# Patient Record
Sex: FEMALE | Employment: OTHER
[De-identification: names, ages, dates, MRNs, and addresses within clinical notes are randomized per-mention and may not be internally consistent; named-entity substitution may affect disease eponyms.]

---

## 2017-01-30 ENCOUNTER — RECORDS - HEALTHEAST (OUTPATIENT)
Dept: ADMINISTRATIVE | Facility: OTHER | Age: 72
End: 2017-01-30

## 2017-02-03 ENCOUNTER — OFFICE VISIT - HEALTHEAST (OUTPATIENT)
Dept: ENDOCRINOLOGY | Facility: CLINIC | Age: 72
End: 2017-02-03

## 2017-02-03 DIAGNOSIS — M81.0 OSTEOPOROSIS: ICD-10-CM

## 2017-02-22 ENCOUNTER — COMMUNICATION - HEALTHEAST (OUTPATIENT)
Dept: ENDOCRINOLOGY | Facility: CLINIC | Age: 72
End: 2017-02-22

## 2017-02-22 DIAGNOSIS — M81.0 OSTEOPOROSIS: ICD-10-CM

## 2017-02-27 ENCOUNTER — COMMUNICATION - HEALTHEAST (OUTPATIENT)
Dept: ADMINISTRATIVE | Facility: CLINIC | Age: 72
End: 2017-02-27

## 2017-06-13 ENCOUNTER — RECORDS - HEALTHEAST (OUTPATIENT)
Dept: RADIOLOGY | Facility: CLINIC | Age: 72
End: 2017-06-13

## 2017-06-13 ENCOUNTER — AMBULATORY - HEALTHEAST (OUTPATIENT)
Dept: NEUROSURGERY | Facility: CLINIC | Age: 72
End: 2017-06-13

## 2017-06-13 ENCOUNTER — OFFICE VISIT - HEALTHEAST (OUTPATIENT)
Dept: NEUROSURGERY | Facility: CLINIC | Age: 72
End: 2017-06-13

## 2017-06-13 DIAGNOSIS — E55.9 VITAMIN D DEFICIENCY: ICD-10-CM

## 2017-06-13 DIAGNOSIS — M48.54XS: ICD-10-CM

## 2017-06-13 DIAGNOSIS — S32.028G OTHER CLOSED FRACTURE OF SECOND LUMBAR VERTEBRA WITH DELAYED HEALING, SUBSEQUENT ENCOUNTER: ICD-10-CM

## 2017-06-13 DIAGNOSIS — M42.00 SCHEUERMANN'S KYPHOSIS: ICD-10-CM

## 2017-06-13 DIAGNOSIS — M16.11 ARTHROPATHY OF RIGHT HIP: ICD-10-CM

## 2017-06-13 DIAGNOSIS — S32.009K PSEUDOARTHROSIS OF LUMBAR SPINE: ICD-10-CM

## 2017-06-13 ASSESSMENT — MIFFLIN-ST. JEOR: SCORE: 1095.97

## 2017-06-21 ENCOUNTER — COMMUNICATION - HEALTHEAST (OUTPATIENT)
Dept: ADMINISTRATIVE | Facility: CLINIC | Age: 72
End: 2017-06-21

## 2017-06-21 ENCOUNTER — AMBULATORY - HEALTHEAST (OUTPATIENT)
Dept: ENDOCRINOLOGY | Facility: CLINIC | Age: 72
End: 2017-06-21

## 2017-06-21 DIAGNOSIS — M81.0 OSTEOPOROSIS: ICD-10-CM

## 2017-06-21 DIAGNOSIS — E55.9 VITAMIN D DEFICIENCY: ICD-10-CM

## 2017-06-27 ENCOUNTER — RECORDS - HEALTHEAST (OUTPATIENT)
Dept: ADMINISTRATIVE | Facility: OTHER | Age: 72
End: 2017-06-27

## 2017-06-27 ENCOUNTER — RECORDS - HEALTHEAST (OUTPATIENT)
Dept: BONE DENSITY | Facility: CLINIC | Age: 72
End: 2017-06-27

## 2017-06-27 DIAGNOSIS — M81.0 AGE-RELATED OSTEOPOROSIS WITHOUT CURRENT PATHOLOGICAL FRACTURE: ICD-10-CM

## 2017-06-28 ENCOUNTER — AMBULATORY - HEALTHEAST (OUTPATIENT)
Dept: ENDOCRINOLOGY | Facility: CLINIC | Age: 72
End: 2017-06-28

## 2017-06-28 DIAGNOSIS — M81.0 OSTEOPOROSIS: ICD-10-CM

## 2017-07-07 ENCOUNTER — AMBULATORY - HEALTHEAST (OUTPATIENT)
Dept: ENDOCRINOLOGY | Facility: CLINIC | Age: 72
End: 2017-07-07

## 2017-07-07 ENCOUNTER — COMMUNICATION - HEALTHEAST (OUTPATIENT)
Dept: ENDOCRINOLOGY | Facility: CLINIC | Age: 72
End: 2017-07-07

## 2017-07-07 DIAGNOSIS — M81.0 OSTEOPOROSIS: ICD-10-CM

## 2017-07-26 ENCOUNTER — AMBULATORY - HEALTHEAST (OUTPATIENT)
Dept: LAB | Facility: CLINIC | Age: 72
End: 2017-07-26

## 2017-07-26 DIAGNOSIS — M81.0 OSTEOPOROSIS: ICD-10-CM

## 2017-08-03 ENCOUNTER — OFFICE VISIT - HEALTHEAST (OUTPATIENT)
Dept: ENDOCRINOLOGY | Facility: CLINIC | Age: 72
End: 2017-08-03

## 2017-08-03 DIAGNOSIS — M81.0 OSTEOPOROSIS: ICD-10-CM

## 2017-08-03 ASSESSMENT — MIFFLIN-ST. JEOR: SCORE: 1098.69

## 2017-08-09 ENCOUNTER — COMMUNICATION - HEALTHEAST (OUTPATIENT)
Dept: ENDOCRINOLOGY | Facility: CLINIC | Age: 72
End: 2017-08-09

## 2017-10-06 ENCOUNTER — ANESTHESIA - HEALTHEAST (OUTPATIENT)
Dept: SURGERY | Facility: CLINIC | Age: 72
End: 2017-10-06

## 2017-10-06 ENCOUNTER — SURGERY - HEALTHEAST (OUTPATIENT)
Dept: SURGERY | Facility: CLINIC | Age: 72
End: 2017-10-06

## 2017-10-06 ASSESSMENT — MIFFLIN-ST. JEOR: SCORE: 1087.46

## 2017-10-07 ASSESSMENT — MIFFLIN-ST. JEOR: SCORE: 1094.6

## 2017-10-08 ASSESSMENT — MIFFLIN-ST. JEOR: SCORE: 1118.7

## 2017-10-09 ASSESSMENT — MIFFLIN-ST. JEOR: SCORE: 1112.75

## 2017-10-10 ASSESSMENT — MIFFLIN-ST. JEOR: SCORE: 1102.32

## 2017-10-13 ENCOUNTER — OFFICE VISIT - HEALTHEAST (OUTPATIENT)
Dept: GERIATRICS | Facility: CLINIC | Age: 72
End: 2017-10-13

## 2017-10-13 DIAGNOSIS — F32.9 MAJOR DEPRESSION: ICD-10-CM

## 2017-10-13 DIAGNOSIS — N18.30 CHRONIC KIDNEY DISEASE, STAGE III (MODERATE) (H): ICD-10-CM

## 2017-10-13 DIAGNOSIS — Z96.641 HISTORY OF TOTAL RIGHT HIP ARTHROPLASTY: ICD-10-CM

## 2017-10-13 DIAGNOSIS — I10 HYPERTENSION: ICD-10-CM

## 2017-10-13 DIAGNOSIS — R52 PAIN MANAGEMENT: ICD-10-CM

## 2017-10-17 ENCOUNTER — OFFICE VISIT - HEALTHEAST (OUTPATIENT)
Dept: GERIATRICS | Facility: CLINIC | Age: 72
End: 2017-10-17

## 2017-10-17 DIAGNOSIS — D64.9 ANEMIA: ICD-10-CM

## 2017-10-17 DIAGNOSIS — Z96.641 HISTORY OF TOTAL RIGHT HIP ARTHROPLASTY: ICD-10-CM

## 2017-10-17 DIAGNOSIS — R52 PAIN MANAGEMENT: ICD-10-CM

## 2017-10-17 DIAGNOSIS — F32.9 MAJOR DEPRESSION: ICD-10-CM

## 2017-10-17 DIAGNOSIS — N18.30 CHRONIC KIDNEY DISEASE, STAGE III (MODERATE) (H): ICD-10-CM

## 2017-10-17 DIAGNOSIS — I10 HYPERTENSION: ICD-10-CM

## 2017-10-19 ENCOUNTER — OFFICE VISIT - HEALTHEAST (OUTPATIENT)
Dept: GERIATRICS | Facility: CLINIC | Age: 72
End: 2017-10-19

## 2017-10-19 DIAGNOSIS — I10 HYPERTENSION: ICD-10-CM

## 2017-10-19 DIAGNOSIS — F32.9 MAJOR DEPRESSION: ICD-10-CM

## 2017-10-19 DIAGNOSIS — R52 PAIN MANAGEMENT: ICD-10-CM

## 2017-10-19 DIAGNOSIS — Z96.641 HISTORY OF TOTAL RIGHT HIP ARTHROPLASTY: ICD-10-CM

## 2017-10-19 DIAGNOSIS — D64.9 ANEMIA: ICD-10-CM

## 2017-10-19 DIAGNOSIS — N18.30 CHRONIC KIDNEY DISEASE, STAGE III (MODERATE) (H): ICD-10-CM

## 2017-10-20 ENCOUNTER — RECORDS - HEALTHEAST (OUTPATIENT)
Dept: LAB | Facility: CLINIC | Age: 72
End: 2017-10-20

## 2017-10-20 LAB — HIV 1+2 AB+HIV1 P24 AG SERPL QL IA: NEGATIVE

## 2017-10-21 LAB
HBV SURFACE AG SERPL QL IA: NEGATIVE
HCV AB SERPL QL IA: NEGATIVE

## 2017-10-23 ENCOUNTER — AMBULATORY - HEALTHEAST (OUTPATIENT)
Dept: GERIATRICS | Facility: CLINIC | Age: 72
End: 2017-10-23

## 2017-10-30 ENCOUNTER — COMMUNICATION - HEALTHEAST (OUTPATIENT)
Dept: NEUROSURGERY | Facility: CLINIC | Age: 72
End: 2017-10-30

## 2017-11-28 ENCOUNTER — AMBULATORY - HEALTHEAST (OUTPATIENT)
Dept: ENDOCRINOLOGY | Facility: CLINIC | Age: 72
End: 2017-11-28

## 2017-11-28 DIAGNOSIS — M81.0 OSTEOPOROSIS: ICD-10-CM

## 2017-11-30 ENCOUNTER — COMMUNICATION - HEALTHEAST (OUTPATIENT)
Dept: ENDOCRINOLOGY | Facility: CLINIC | Age: 72
End: 2017-11-30

## 2017-12-14 ENCOUNTER — HOSPITAL ENCOUNTER (OUTPATIENT)
Dept: RADIOLOGY | Facility: CLINIC | Age: 72
Discharge: HOME | End: 2017-12-14
Attending: NEUROLOGICAL SURGERY

## 2017-12-14 DIAGNOSIS — S32.009K PSEUDOARTHROSIS OF LUMBAR SPINE: ICD-10-CM

## 2017-12-14 DIAGNOSIS — M42.00 SCHEUERMANN'S KYPHOSIS: ICD-10-CM

## 2017-12-18 ENCOUNTER — OFFICE VISIT - HEALTHEAST (OUTPATIENT)
Dept: NEUROSURGERY | Facility: CLINIC | Age: 72
End: 2017-12-18

## 2017-12-18 DIAGNOSIS — S32.009K PSEUDOARTHROSIS OF LUMBAR SPINE: ICD-10-CM

## 2017-12-18 DIAGNOSIS — M48.54XA: ICD-10-CM

## 2017-12-18 ASSESSMENT — MIFFLIN-ST. JEOR: SCORE: 1095.97

## 2018-02-06 ENCOUNTER — OFFICE VISIT - HEALTHEAST (OUTPATIENT)
Dept: ENDOCRINOLOGY | Facility: CLINIC | Age: 73
End: 2018-02-06

## 2018-02-06 DIAGNOSIS — M81.0 OSTEOPOROSIS: ICD-10-CM

## 2018-02-06 ASSESSMENT — MIFFLIN-ST. JEOR: SCORE: 1121.37

## 2018-04-26 ENCOUNTER — RECORDS - HEALTHEAST (OUTPATIENT)
Dept: ADMINISTRATIVE | Facility: OTHER | Age: 73
End: 2018-04-26

## 2018-04-26 ENCOUNTER — COMMUNICATION - HEALTHEAST (OUTPATIENT)
Dept: NEUROSURGERY | Facility: CLINIC | Age: 73
End: 2018-04-26

## 2018-04-27 ENCOUNTER — AMBULATORY - HEALTHEAST (OUTPATIENT)
Dept: NEUROSURGERY | Facility: CLINIC | Age: 73
End: 2018-04-27

## 2018-04-27 DIAGNOSIS — M54.9 BACK PAIN: ICD-10-CM

## 2018-07-06 ENCOUNTER — OFFICE VISIT - HEALTHEAST (OUTPATIENT)
Dept: NEUROSURGERY | Facility: CLINIC | Age: 73
End: 2018-07-06

## 2018-07-06 ENCOUNTER — HOSPITAL ENCOUNTER (OUTPATIENT)
Dept: RADIOLOGY | Facility: CLINIC | Age: 73
Discharge: HOME | End: 2018-07-06
Attending: NEUROLOGICAL SURGERY

## 2018-07-06 DIAGNOSIS — M81.0 AGE-RELATED OSTEOPOROSIS WITHOUT CURRENT PATHOLOGICAL FRACTURE: ICD-10-CM

## 2018-07-06 DIAGNOSIS — E55.9 VITAMIN D DEFICIENCY: ICD-10-CM

## 2018-07-06 DIAGNOSIS — S32.009K LUMBAR PSEUDOARTHROSIS: ICD-10-CM

## 2018-07-06 DIAGNOSIS — G89.29 CHRONIC LOW BACK PAIN: ICD-10-CM

## 2018-07-06 DIAGNOSIS — M43.16 SPONDYLOLISTHESIS OF LUMBAR REGION: ICD-10-CM

## 2018-07-06 DIAGNOSIS — M54.50 CHRONIC LOW BACK PAIN: ICD-10-CM

## 2018-07-06 DIAGNOSIS — M54.9 BACK PAIN: ICD-10-CM

## 2018-07-06 ASSESSMENT — MIFFLIN-ST. JEOR: SCORE: 1118.65

## 2018-07-16 ENCOUNTER — COMMUNICATION - HEALTHEAST (OUTPATIENT)
Dept: NEUROSURGERY | Facility: CLINIC | Age: 73
End: 2018-07-16

## 2018-07-19 ENCOUNTER — AMBULATORY - HEALTHEAST (OUTPATIENT)
Dept: LAB | Facility: CLINIC | Age: 73
End: 2018-07-19

## 2018-07-19 DIAGNOSIS — E55.9 VITAMIN D DEFICIENCY: ICD-10-CM

## 2018-07-20 LAB — 25(OH)D3 SERPL-MCNC: 25.4 NG/ML (ref 30–80)

## 2018-07-31 ENCOUNTER — COMMUNICATION - HEALTHEAST (OUTPATIENT)
Dept: ADMINISTRATIVE | Facility: CLINIC | Age: 73
End: 2018-07-31

## 2018-07-31 ENCOUNTER — COMMUNICATION - HEALTHEAST (OUTPATIENT)
Dept: NEUROSURGERY | Facility: CLINIC | Age: 73
End: 2018-07-31

## 2018-07-31 DIAGNOSIS — E55.9 VITAMIN D DEFICIENCY: ICD-10-CM

## 2018-07-31 DIAGNOSIS — S32.009K PSEUDOARTHROSIS OF LUMBAR SPINE: ICD-10-CM

## 2018-07-31 DIAGNOSIS — M81.0 SENILE OSTEOPOROSIS: ICD-10-CM

## 2018-07-31 DIAGNOSIS — M81.0 AGE-RELATED OSTEOPOROSIS WITHOUT CURRENT PATHOLOGICAL FRACTURE: ICD-10-CM

## 2018-08-02 ENCOUNTER — COMMUNICATION - HEALTHEAST (OUTPATIENT)
Dept: NEUROSURGERY | Facility: CLINIC | Age: 73
End: 2018-08-02

## 2018-08-31 ENCOUNTER — RECORDS - HEALTHEAST (OUTPATIENT)
Dept: BONE DENSITY | Facility: CLINIC | Age: 73
End: 2018-08-31

## 2018-08-31 ENCOUNTER — RECORDS - HEALTHEAST (OUTPATIENT)
Dept: ADMINISTRATIVE | Facility: OTHER | Age: 73
End: 2018-08-31

## 2018-08-31 DIAGNOSIS — M81.0 AGE-RELATED OSTEOPOROSIS WITHOUT CURRENT PATHOLOGICAL FRACTURE: ICD-10-CM

## 2018-08-31 DIAGNOSIS — S32.009K UNSPECIFIED FRACTURE OF UNSPECIFIED LUMBAR VERTEBRA, SUBSEQUENT ENCOUNTER FOR FRACTURE WITH NONUNION: ICD-10-CM

## 2018-09-21 ENCOUNTER — AMBULATORY - HEALTHEAST (OUTPATIENT)
Dept: LAB | Facility: CLINIC | Age: 73
End: 2018-09-21

## 2018-09-21 DIAGNOSIS — E55.9 VITAMIN D DEFICIENCY: ICD-10-CM

## 2018-09-21 LAB — 25(OH)D3 SERPL-MCNC: 45.5 NG/ML (ref 30–80)

## 2018-09-26 ENCOUNTER — OFFICE VISIT - HEALTHEAST (OUTPATIENT)
Dept: NEUROSURGERY | Facility: CLINIC | Age: 73
End: 2018-09-26

## 2018-09-26 DIAGNOSIS — M47.26 OSTEOARTHRITIS OF SPINE WITH RADICULOPATHY, LUMBAR REGION: ICD-10-CM

## 2018-09-26 DIAGNOSIS — S32.009K PSEUDOARTHROSIS OF LUMBAR SPINE: ICD-10-CM

## 2018-09-27 ENCOUNTER — COMMUNICATION - HEALTHEAST (OUTPATIENT)
Dept: NEUROSURGERY | Facility: CLINIC | Age: 73
End: 2018-09-27

## 2018-10-04 ENCOUNTER — COMMUNICATION - HEALTHEAST (OUTPATIENT)
Dept: NEUROSURGERY | Facility: CLINIC | Age: 73
End: 2018-10-04

## 2018-10-10 ENCOUNTER — HOSPITAL ENCOUNTER (OUTPATIENT)
Dept: MRI IMAGING | Facility: CLINIC | Age: 73
Discharge: HOME | End: 2018-10-10
Attending: NEUROLOGICAL SURGERY

## 2018-10-10 DIAGNOSIS — S32.009K PSEUDOARTHROSIS OF LUMBAR SPINE: ICD-10-CM

## 2018-10-10 DIAGNOSIS — M47.26 OSTEOARTHRITIS OF SPINE WITH RADICULOPATHY, LUMBAR REGION: ICD-10-CM

## 2018-10-17 ENCOUNTER — COMMUNICATION - HEALTHEAST (OUTPATIENT)
Dept: NEUROSURGERY | Facility: CLINIC | Age: 73
End: 2018-10-17

## 2018-10-18 ENCOUNTER — COMMUNICATION - HEALTHEAST (OUTPATIENT)
Dept: NEUROSURGERY | Facility: CLINIC | Age: 73
End: 2018-10-18

## 2018-10-19 ENCOUNTER — RECORDS - HEALTHEAST (OUTPATIENT)
Dept: ADMINISTRATIVE | Facility: OTHER | Age: 73
End: 2018-10-19

## 2018-10-19 ENCOUNTER — AMBULATORY - HEALTHEAST (OUTPATIENT)
Dept: NEUROSURGERY | Facility: CLINIC | Age: 73
End: 2018-10-19

## 2018-10-19 DIAGNOSIS — Z01.818 PRE-OP EVALUATION: ICD-10-CM

## 2018-11-02 ENCOUNTER — AMBULATORY - HEALTHEAST (OUTPATIENT)
Dept: NEUROSURGERY | Facility: CLINIC | Age: 73
End: 2018-11-02

## 2018-11-07 ENCOUNTER — AMBULATORY - HEALTHEAST (OUTPATIENT)
Dept: LAB | Facility: CLINIC | Age: 73
End: 2018-11-07

## 2018-11-07 ENCOUNTER — OFFICE VISIT - HEALTHEAST (OUTPATIENT)
Dept: NEUROSURGERY | Facility: CLINIC | Age: 73
End: 2018-11-07

## 2018-11-07 DIAGNOSIS — Z01.818 PRE-OP EVALUATION: ICD-10-CM

## 2018-11-07 DIAGNOSIS — Z01.818 PRE-OP EXAM: ICD-10-CM

## 2018-11-07 LAB
ALBUMIN UR-MCNC: ABNORMAL MG/DL
ANION GAP SERPL CALCULATED.3IONS-SCNC: 12 MMOL/L (ref 5–18)
APPEARANCE UR: ABNORMAL
APTT PPP: 31 SECONDS (ref 24–37)
BACTERIA #/AREA URNS HPF: ABNORMAL HPF
BASOPHILS # BLD AUTO: 0.1 THOU/UL (ref 0–0.2)
BASOPHILS NFR BLD AUTO: 1 % (ref 0–2)
BILIRUB UR QL STRIP: NEGATIVE
BUN SERPL-MCNC: 18 MG/DL (ref 8–28)
CALCIUM SERPL-MCNC: 10 MG/DL (ref 8.5–10.5)
CHLORIDE BLD-SCNC: 103 MMOL/L (ref 98–107)
CO2 SERPL-SCNC: 21 MMOL/L (ref 22–31)
COLOR UR AUTO: YELLOW
CREAT SERPL-MCNC: 1.25 MG/DL (ref 0.6–1.1)
EOSINOPHIL # BLD AUTO: 0.2 THOU/UL (ref 0–0.4)
EOSINOPHIL NFR BLD AUTO: 2 % (ref 0–6)
ERYTHROCYTE [DISTWIDTH] IN BLOOD BY AUTOMATED COUNT: 14.6 % (ref 11–14.5)
GFR SERPL CREATININE-BSD FRML MDRD: 42 ML/MIN/1.73M2
GLUCOSE BLD-MCNC: 87 MG/DL (ref 70–125)
GLUCOSE UR STRIP-MCNC: NEGATIVE MG/DL
HCT VFR BLD AUTO: 35.9 % (ref 35–47)
HGB BLD-MCNC: 11.4 G/DL (ref 12–16)
HGB UR QL STRIP: NEGATIVE
HYALINE CASTS #/AREA URNS LPF: ABNORMAL LPF
INR PPP: 1.01 (ref 0.9–1.1)
KETONES UR STRIP-MCNC: NEGATIVE MG/DL
LEUKOCYTE ESTERASE UR QL STRIP: ABNORMAL
LYMPHOCYTES # BLD AUTO: 2.3 THOU/UL (ref 0.8–4.4)
LYMPHOCYTES NFR BLD AUTO: 24 % (ref 20–40)
MCH RBC QN AUTO: 28.5 PG (ref 27–34)
MCHC RBC AUTO-ENTMCNC: 31.8 G/DL (ref 32–36)
MCV RBC AUTO: 90 FL (ref 80–100)
MONOCYTES # BLD AUTO: 0.7 THOU/UL (ref 0–0.9)
MONOCYTES NFR BLD AUTO: 8 % (ref 2–10)
MUCOUS THREADS #/AREA URNS LPF: ABNORMAL LPF
NEUTROPHILS # BLD AUTO: 6.3 THOU/UL (ref 2–7.7)
NEUTROPHILS NFR BLD AUTO: 66 % (ref 50–70)
NITRATE UR QL: NEGATIVE
PH UR STRIP: 5.5 [PH] (ref 4.5–8)
PLATELET # BLD AUTO: 303 THOU/UL (ref 140–440)
PMV BLD AUTO: 10.8 FL (ref 8.5–12.5)
POTASSIUM BLD-SCNC: 4.3 MMOL/L (ref 3.5–5)
RBC # BLD AUTO: 4 MILL/UL (ref 3.8–5.4)
RBC #/AREA URNS AUTO: ABNORMAL HPF
SODIUM SERPL-SCNC: 136 MMOL/L (ref 136–145)
SP GR UR STRIP: 1.02 (ref 1–1.03)
SQUAMOUS #/AREA URNS AUTO: ABNORMAL LPF
TRANS CELLS #/AREA URNS HPF: ABNORMAL LPF
UROBILINOGEN UR STRIP-ACNC: ABNORMAL
WBC #/AREA URNS AUTO: ABNORMAL HPF
WBC: 9.6 THOU/UL (ref 4–11)

## 2018-11-07 ASSESSMENT — MIFFLIN-ST. JEOR: SCORE: 1114.16

## 2018-11-08 ENCOUNTER — ANESTHESIA - HEALTHEAST (OUTPATIENT)
Dept: SURGERY | Facility: CLINIC | Age: 73
End: 2018-11-08

## 2018-11-08 LAB — BACTERIA SPEC CULT: NO GROWTH

## 2018-11-09 ENCOUNTER — SURGERY - HEALTHEAST (OUTPATIENT)
Dept: SURGERY | Facility: CLINIC | Age: 73
End: 2018-11-09

## 2018-11-09 ASSESSMENT — MIFFLIN-ST. JEOR: SCORE: 1102.49

## 2018-11-11 ASSESSMENT — MIFFLIN-ST. JEOR: SCORE: 1130.89

## 2018-11-13 ENCOUNTER — AMBULATORY - HEALTHEAST (OUTPATIENT)
Dept: OTHER | Facility: CLINIC | Age: 73
End: 2018-11-13

## 2018-11-14 ENCOUNTER — AMBULATORY - HEALTHEAST (OUTPATIENT)
Dept: OTHER | Facility: CLINIC | Age: 73
End: 2018-11-14

## 2018-11-16 ENCOUNTER — RECORDS - HEALTHEAST (OUTPATIENT)
Dept: LAB | Facility: CLINIC | Age: 73
End: 2018-11-16

## 2018-11-16 ENCOUNTER — COMMUNICATION - HEALTHEAST (OUTPATIENT)
Dept: NEUROSURGERY | Facility: CLINIC | Age: 73
End: 2018-11-16

## 2018-11-16 ENCOUNTER — OFFICE VISIT - HEALTHEAST (OUTPATIENT)
Dept: GERIATRICS | Facility: CLINIC | Age: 73
End: 2018-11-16

## 2018-11-16 DIAGNOSIS — R52 PAIN MANAGEMENT: ICD-10-CM

## 2018-11-16 DIAGNOSIS — I10 ESSENTIAL HYPERTENSION: ICD-10-CM

## 2018-11-16 DIAGNOSIS — F41.8 DEPRESSION WITH ANXIETY: ICD-10-CM

## 2018-11-16 DIAGNOSIS — R33.9 URINARY RETENTION: ICD-10-CM

## 2018-11-16 DIAGNOSIS — Z13.6 ENCOUNTER FOR SCREENING FOR VASCULAR DISEASE: ICD-10-CM

## 2018-11-16 DIAGNOSIS — Z98.1 S/P LUMBAR FUSION: ICD-10-CM

## 2018-11-16 DIAGNOSIS — Z98.1 STATUS POST LUMBAR SPINAL FUSION: ICD-10-CM

## 2018-11-16 LAB — HGB BLD-MCNC: 9 G/DL (ref 12–16)

## 2018-11-19 ENCOUNTER — OFFICE VISIT - HEALTHEAST (OUTPATIENT)
Dept: GERIATRICS | Facility: CLINIC | Age: 73
End: 2018-11-19

## 2018-11-19 DIAGNOSIS — F41.8 DEPRESSION WITH ANXIETY: ICD-10-CM

## 2018-11-19 DIAGNOSIS — Z98.1 STATUS POST LUMBAR SPINAL FUSION: ICD-10-CM

## 2018-11-19 DIAGNOSIS — M54.16 RADICULOPATHY OF LUMBAR REGION: ICD-10-CM

## 2018-11-19 DIAGNOSIS — N18.30 CHRONIC KIDNEY DISEASE, STAGE III (MODERATE) (H): ICD-10-CM

## 2018-11-19 DIAGNOSIS — R52 PAIN MANAGEMENT: ICD-10-CM

## 2018-11-19 DIAGNOSIS — R33.9 URINARY RETENTION: ICD-10-CM

## 2018-11-19 DIAGNOSIS — I10 ESSENTIAL HYPERTENSION: ICD-10-CM

## 2018-11-21 ENCOUNTER — OFFICE VISIT - HEALTHEAST (OUTPATIENT)
Dept: GERIATRICS | Facility: CLINIC | Age: 73
End: 2018-11-21

## 2018-11-21 DIAGNOSIS — R52 PAIN MANAGEMENT: ICD-10-CM

## 2018-11-21 DIAGNOSIS — Z98.1 STATUS POST LUMBAR SPINAL FUSION: ICD-10-CM

## 2018-11-21 DIAGNOSIS — I10 ESSENTIAL HYPERTENSION: ICD-10-CM

## 2018-11-21 DIAGNOSIS — M54.16 RADICULOPATHY OF LUMBAR REGION: ICD-10-CM

## 2018-11-24 ENCOUNTER — RECORDS - HEALTHEAST (OUTPATIENT)
Dept: LAB | Facility: CLINIC | Age: 73
End: 2018-11-24

## 2018-11-24 LAB
ALBUMIN UR-MCNC: ABNORMAL MG/DL
APPEARANCE UR: ABNORMAL
BACTERIA #/AREA URNS HPF: ABNORMAL HPF
BILIRUB UR QL STRIP: NEGATIVE
COLOR UR AUTO: YELLOW
GLUCOSE UR STRIP-MCNC: NEGATIVE MG/DL
HGB UR QL STRIP: ABNORMAL
KETONES UR STRIP-MCNC: NEGATIVE MG/DL
LEUKOCYTE ESTERASE UR QL STRIP: ABNORMAL
MUCOUS THREADS #/AREA URNS LPF: ABNORMAL LPF
NITRATE UR QL: POSITIVE
PH UR STRIP: 5.5 [PH] (ref 4.5–8)
RBC #/AREA URNS AUTO: ABNORMAL HPF
SP GR UR STRIP: 1.02 (ref 1–1.03)
SQUAMOUS #/AREA URNS AUTO: ABNORMAL LPF
UROBILINOGEN UR STRIP-ACNC: ABNORMAL
WBC #/AREA URNS AUTO: >100 HPF
WBC CLUMPS #/AREA URNS HPF: PRESENT /[HPF]

## 2018-11-26 ENCOUNTER — RECORDS - HEALTHEAST (OUTPATIENT)
Dept: LAB | Facility: CLINIC | Age: 73
End: 2018-11-26

## 2018-11-26 ENCOUNTER — AMBULATORY - HEALTHEAST (OUTPATIENT)
Dept: NEUROSURGERY | Facility: CLINIC | Age: 73
End: 2018-11-26

## 2018-11-26 LAB
BACTERIA SPEC CULT: ABNORMAL
HGB BLD-MCNC: 7.9 G/DL (ref 12–16)

## 2018-11-27 ENCOUNTER — OFFICE VISIT - HEALTHEAST (OUTPATIENT)
Dept: GERIATRICS | Facility: CLINIC | Age: 73
End: 2018-11-27

## 2018-11-27 DIAGNOSIS — N18.30 CHRONIC KIDNEY DISEASE, STAGE III (MODERATE) (H): ICD-10-CM

## 2018-11-27 DIAGNOSIS — N30.01 ACUTE CYSTITIS WITH HEMATURIA: ICD-10-CM

## 2018-11-27 DIAGNOSIS — M54.16 RADICULOPATHY OF LUMBAR REGION: ICD-10-CM

## 2018-11-27 DIAGNOSIS — Z98.1 STATUS POST LUMBAR SPINAL FUSION: ICD-10-CM

## 2018-11-27 DIAGNOSIS — R52 PAIN MANAGEMENT: ICD-10-CM

## 2018-11-27 DIAGNOSIS — D50.0 IRON DEFICIENCY ANEMIA DUE TO CHRONIC BLOOD LOSS: ICD-10-CM

## 2018-11-28 ENCOUNTER — OFFICE VISIT - HEALTHEAST (OUTPATIENT)
Dept: GERIATRICS | Facility: CLINIC | Age: 73
End: 2018-11-28

## 2018-11-28 ENCOUNTER — HOME CARE/HOSPICE - HEALTHEAST (OUTPATIENT)
Dept: HOME HEALTH SERVICES | Facility: HOME HEALTH | Age: 73
End: 2018-11-28

## 2018-11-28 DIAGNOSIS — M54.16 RADICULOPATHY OF LUMBAR REGION: ICD-10-CM

## 2018-11-28 DIAGNOSIS — I10 ESSENTIAL HYPERTENSION: ICD-10-CM

## 2018-11-28 DIAGNOSIS — D50.0 IRON DEFICIENCY ANEMIA DUE TO CHRONIC BLOOD LOSS: ICD-10-CM

## 2018-11-28 DIAGNOSIS — N30.01 ACUTE CYSTITIS WITH HEMATURIA: ICD-10-CM

## 2018-11-28 DIAGNOSIS — Z98.1 STATUS POST LUMBAR SPINAL FUSION: ICD-10-CM

## 2018-11-28 DIAGNOSIS — R52 PAIN MANAGEMENT: ICD-10-CM

## 2018-11-28 DIAGNOSIS — N18.30 CHRONIC KIDNEY DISEASE, STAGE III (MODERATE) (H): ICD-10-CM

## 2018-11-29 ENCOUNTER — RECORDS - HEALTHEAST (OUTPATIENT)
Dept: LAB | Facility: CLINIC | Age: 73
End: 2018-11-29

## 2018-11-29 ENCOUNTER — RECORDS - HEALTHEAST (OUTPATIENT)
Dept: ADMINISTRATIVE | Facility: OTHER | Age: 73
End: 2018-11-29

## 2018-11-29 LAB — HGB BLD-MCNC: 7.8 G/DL (ref 12–16)

## 2018-11-30 ENCOUNTER — RECORDS - HEALTHEAST (OUTPATIENT)
Dept: ADMINISTRATIVE | Facility: OTHER | Age: 73
End: 2018-11-30

## 2018-11-30 ENCOUNTER — AMBULATORY - HEALTHEAST (OUTPATIENT)
Dept: GERIATRICS | Facility: CLINIC | Age: 73
End: 2018-11-30

## 2018-12-01 ENCOUNTER — HOME CARE/HOSPICE - HEALTHEAST (OUTPATIENT)
Dept: HOME HEALTH SERVICES | Facility: HOME HEALTH | Age: 73
End: 2018-12-01

## 2018-12-04 ENCOUNTER — HOME CARE/HOSPICE - HEALTHEAST (OUTPATIENT)
Dept: HOME HEALTH SERVICES | Facility: HOME HEALTH | Age: 73
End: 2018-12-04

## 2018-12-05 ENCOUNTER — HOME CARE/HOSPICE - HEALTHEAST (OUTPATIENT)
Dept: HOME HEALTH SERVICES | Facility: HOME HEALTH | Age: 73
End: 2018-12-05

## 2018-12-06 ENCOUNTER — HOME CARE/HOSPICE - HEALTHEAST (OUTPATIENT)
Dept: HOME HEALTH SERVICES | Facility: HOME HEALTH | Age: 73
End: 2018-12-06

## 2018-12-08 ENCOUNTER — HOME CARE/HOSPICE - HEALTHEAST (OUTPATIENT)
Dept: HOME HEALTH SERVICES | Facility: HOME HEALTH | Age: 73
End: 2018-12-08

## 2018-12-10 ENCOUNTER — HOME CARE/HOSPICE - HEALTHEAST (OUTPATIENT)
Dept: HOME HEALTH SERVICES | Facility: HOME HEALTH | Age: 73
End: 2018-12-10

## 2018-12-11 ENCOUNTER — HOME CARE/HOSPICE - HEALTHEAST (OUTPATIENT)
Dept: HOME HEALTH SERVICES | Facility: HOME HEALTH | Age: 73
End: 2018-12-11

## 2019-01-03 ENCOUNTER — HOSPITAL ENCOUNTER (OUTPATIENT)
Dept: RADIOLOGY | Facility: CLINIC | Age: 74
Discharge: HOME | End: 2019-01-03
Attending: NEUROLOGICAL SURGERY

## 2019-01-03 ENCOUNTER — OFFICE VISIT - HEALTHEAST (OUTPATIENT)
Dept: NEUROSURGERY | Facility: CLINIC | Age: 74
End: 2019-01-03

## 2019-01-03 DIAGNOSIS — M54.16 LUMBAR RADICULOPATHY: ICD-10-CM

## 2019-01-03 DIAGNOSIS — M81.0 AGE-RELATED OSTEOPOROSIS WITHOUT CURRENT PATHOLOGICAL FRACTURE: ICD-10-CM

## 2019-01-03 DIAGNOSIS — M62.838 MUSCLE SPASM: ICD-10-CM

## 2019-01-03 DIAGNOSIS — Z87.440 PERSONAL HISTORY OF URINARY TRACT INFECTION: ICD-10-CM

## 2019-01-03 DIAGNOSIS — M43.16 SPONDYLOLISTHESIS OF LUMBAR REGION: ICD-10-CM

## 2019-01-03 DIAGNOSIS — Z98.1 S/P LUMBAR FUSION: ICD-10-CM

## 2019-01-03 DIAGNOSIS — S32.009K PSEUDOARTHROSIS OF LUMBAR SPINE: ICD-10-CM

## 2019-01-04 ENCOUNTER — COMMUNICATION - HEALTHEAST (OUTPATIENT)
Dept: NEUROSURGERY | Facility: CLINIC | Age: 74
End: 2019-01-04

## 2019-01-09 ENCOUNTER — AMBULATORY - HEALTHEAST (OUTPATIENT)
Dept: LAB | Facility: CLINIC | Age: 74
End: 2019-01-09

## 2019-01-09 DIAGNOSIS — M54.16 LUMBAR RADICULOPATHY: ICD-10-CM

## 2019-01-09 DIAGNOSIS — Z87.440 PERSONAL HISTORY OF URINARY TRACT INFECTION: ICD-10-CM

## 2019-01-09 LAB
ALBUMIN UR-MCNC: ABNORMAL MG/DL
APPEARANCE UR: ABNORMAL
BACTERIA #/AREA URNS HPF: ABNORMAL HPF
BILIRUB UR QL STRIP: NEGATIVE
C REACTIVE PROTEIN LHE: 5.3 MG/DL (ref 0–0.8)
COLOR UR AUTO: YELLOW
ERYTHROCYTE [DISTWIDTH] IN BLOOD BY AUTOMATED COUNT: 16.3 % (ref 11–14.5)
ERYTHROCYTE [SEDIMENTATION RATE] IN BLOOD BY WESTERGREN METHOD: 58 MM/HR (ref 0–20)
GLUCOSE UR STRIP-MCNC: NEGATIVE MG/DL
HCT VFR BLD AUTO: 29.6 % (ref 35–47)
HGB BLD-MCNC: 8.8 G/DL (ref 12–16)
HGB UR QL STRIP: NEGATIVE
HYALINE CASTS #/AREA URNS LPF: ABNORMAL LPF
KETONES UR STRIP-MCNC: NEGATIVE MG/DL
LEUKOCYTE ESTERASE UR QL STRIP: ABNORMAL
MCH RBC QN AUTO: 25.1 PG (ref 27–34)
MCHC RBC AUTO-ENTMCNC: 29.7 G/DL (ref 32–36)
MCV RBC AUTO: 85 FL (ref 80–100)
MUCOUS THREADS #/AREA URNS LPF: ABNORMAL LPF
NITRATE UR QL: NEGATIVE
PH UR STRIP: 5.5 [PH] (ref 4.5–8)
PLATELET # BLD AUTO: 280 THOU/UL (ref 140–440)
PMV BLD AUTO: 11.2 FL (ref 8.5–12.5)
RBC # BLD AUTO: 3.5 MILL/UL (ref 3.8–5.4)
RBC #/AREA URNS AUTO: ABNORMAL HPF
SP GR UR STRIP: 1.02 (ref 1–1.03)
SQUAMOUS #/AREA URNS AUTO: ABNORMAL LPF
UROBILINOGEN UR STRIP-ACNC: ABNORMAL
WBC #/AREA URNS AUTO: ABNORMAL HPF
WBC: 7.6 THOU/UL (ref 4–11)

## 2019-01-10 LAB — BACTERIA SPEC CULT: ABNORMAL

## 2019-01-11 ENCOUNTER — COMMUNICATION - HEALTHEAST (OUTPATIENT)
Dept: NEUROSURGERY | Facility: CLINIC | Age: 74
End: 2019-01-11

## 2019-01-14 ENCOUNTER — COMMUNICATION - HEALTHEAST (OUTPATIENT)
Dept: NEUROSURGERY | Facility: CLINIC | Age: 74
End: 2019-01-14

## 2019-01-15 ENCOUNTER — COMMUNICATION - HEALTHEAST (OUTPATIENT)
Dept: NEUROSURGERY | Facility: CLINIC | Age: 74
End: 2019-01-15

## 2019-01-15 DIAGNOSIS — M54.16 LUMBAR RADICULOPATHY: ICD-10-CM

## 2019-01-15 DIAGNOSIS — G89.29 CHRONIC BILATERAL LOW BACK PAIN WITH LEFT-SIDED SCIATICA: ICD-10-CM

## 2019-01-15 DIAGNOSIS — M54.42 CHRONIC BILATERAL LOW BACK PAIN WITH LEFT-SIDED SCIATICA: ICD-10-CM

## 2019-02-01 ENCOUNTER — AMBULATORY - HEALTHEAST (OUTPATIENT)
Dept: LAB | Facility: CLINIC | Age: 74
End: 2019-02-01

## 2019-02-01 DIAGNOSIS — M54.42 CHRONIC BILATERAL LOW BACK PAIN WITH LEFT-SIDED SCIATICA: ICD-10-CM

## 2019-02-01 DIAGNOSIS — M54.16 LUMBAR RADICULOPATHY: ICD-10-CM

## 2019-02-01 DIAGNOSIS — G89.29 CHRONIC BILATERAL LOW BACK PAIN WITH LEFT-SIDED SCIATICA: ICD-10-CM

## 2019-02-01 LAB
C REACTIVE PROTEIN LHE: 1.2 MG/DL (ref 0–0.8)
ERYTHROCYTE [SEDIMENTATION RATE] IN BLOOD BY WESTERGREN METHOD: 49 MM/HR (ref 0–20)

## 2019-02-04 ENCOUNTER — COMMUNICATION - HEALTHEAST (OUTPATIENT)
Dept: NEUROSURGERY | Facility: CLINIC | Age: 74
End: 2019-02-04

## 2019-02-04 DIAGNOSIS — Z98.1 S/P LUMBAR FUSION: ICD-10-CM

## 2019-02-06 ENCOUNTER — OFFICE VISIT - HEALTHEAST (OUTPATIENT)
Dept: ENDOCRINOLOGY | Facility: CLINIC | Age: 74
End: 2019-02-06

## 2019-02-06 DIAGNOSIS — M81.0 SENILE OSTEOPOROSIS: ICD-10-CM

## 2019-02-06 ASSESSMENT — MIFFLIN-ST. JEOR: SCORE: 1079.18

## 2019-02-28 ENCOUNTER — HOSPITAL ENCOUNTER (OUTPATIENT)
Dept: RADIOLOGY | Facility: CLINIC | Age: 74
Discharge: HOME | End: 2019-02-28
Attending: NEUROLOGICAL SURGERY

## 2019-02-28 ENCOUNTER — AMBULATORY - HEALTHEAST (OUTPATIENT)
Dept: LAB | Facility: CLINIC | Age: 74
End: 2019-02-28

## 2019-02-28 ENCOUNTER — OFFICE VISIT - HEALTHEAST (OUTPATIENT)
Dept: NEUROSURGERY | Facility: CLINIC | Age: 74
End: 2019-02-28

## 2019-02-28 DIAGNOSIS — M54.16 LUMBAR RADICULOPATHY: ICD-10-CM

## 2019-02-28 DIAGNOSIS — S32.009K PSEUDOARTHROSIS OF LUMBAR SPINE: ICD-10-CM

## 2019-02-28 DIAGNOSIS — M43.16 SPONDYLOLISTHESIS OF LUMBAR REGION: ICD-10-CM

## 2019-02-28 DIAGNOSIS — Z98.1 S/P LUMBAR FUSION: ICD-10-CM

## 2019-02-28 DIAGNOSIS — M81.0 AGE-RELATED OSTEOPOROSIS WITHOUT CURRENT PATHOLOGICAL FRACTURE: ICD-10-CM

## 2019-02-28 LAB
C REACTIVE PROTEIN LHE: 0.2 MG/DL (ref 0–0.8)
ERYTHROCYTE [SEDIMENTATION RATE] IN BLOOD BY WESTERGREN METHOD: 13 MM/HR (ref 0–20)

## 2019-02-28 RX ORDER — GABAPENTIN 100 MG/1
100 CAPSULE ORAL 3 TIMES DAILY
Qty: 90 CAPSULE | Refills: 3 | Status: SHIPPED | OUTPATIENT
Start: 2019-02-28 | End: 2022-05-10 | Stop reason: ALTCHOICE

## 2019-02-28 ASSESSMENT — MIFFLIN-ST. JEOR: SCORE: 1077.82

## 2019-03-01 ENCOUNTER — COMMUNICATION - HEALTHEAST (OUTPATIENT)
Dept: NEUROSURGERY | Facility: CLINIC | Age: 74
End: 2019-03-01

## 2019-03-20 ENCOUNTER — COMMUNICATION - HEALTHEAST (OUTPATIENT)
Dept: NEUROSURGERY | Facility: CLINIC | Age: 74
End: 2019-03-20

## 2019-03-20 DIAGNOSIS — Z98.1 S/P LUMBAR FUSION: ICD-10-CM

## 2019-04-18 ENCOUNTER — COMMUNICATION - HEALTHEAST (OUTPATIENT)
Dept: NEUROSURGERY | Facility: CLINIC | Age: 74
End: 2019-04-18

## 2019-04-18 ENCOUNTER — AMBULATORY - HEALTHEAST (OUTPATIENT)
Dept: NEUROSURGERY | Facility: CLINIC | Age: 74
End: 2019-04-18

## 2019-04-18 DIAGNOSIS — M54.16 LUMBAR RADICULOPATHY: ICD-10-CM

## 2019-05-21 ENCOUNTER — OFFICE VISIT - HEALTHEAST (OUTPATIENT)
Dept: NEUROSURGERY | Facility: CLINIC | Age: 74
End: 2019-05-21

## 2019-05-21 DIAGNOSIS — M81.0 AGE-RELATED OSTEOPOROSIS WITHOUT CURRENT PATHOLOGICAL FRACTURE: ICD-10-CM

## 2019-05-21 DIAGNOSIS — M47.26 OSTEOARTHRITIS OF SPINE WITH RADICULOPATHY, LUMBAR REGION: ICD-10-CM

## 2019-05-21 DIAGNOSIS — S32.009K LUMBAR PSEUDOARTHROSIS: ICD-10-CM

## 2019-05-21 DIAGNOSIS — M43.16 SPONDYLOLISTHESIS OF LUMBAR REGION: ICD-10-CM

## 2019-05-21 ASSESSMENT — MIFFLIN-ST. JEOR: SCORE: 1077.82

## 2019-05-31 ENCOUNTER — AMBULATORY - HEALTHEAST (OUTPATIENT)
Dept: NEUROSURGERY | Facility: CLINIC | Age: 74
End: 2019-05-31

## 2019-05-31 ENCOUNTER — COMMUNICATION - HEALTHEAST (OUTPATIENT)
Dept: NEUROSURGERY | Facility: CLINIC | Age: 74
End: 2019-05-31

## 2019-05-31 DIAGNOSIS — M54.16 LUMBAR RADICULOPATHY: ICD-10-CM

## 2019-08-06 ENCOUNTER — RECORDS - HEALTHEAST (OUTPATIENT)
Dept: ADMINISTRATIVE | Facility: OTHER | Age: 74
End: 2019-08-06

## 2019-08-28 ENCOUNTER — RECORDS - HEALTHEAST (OUTPATIENT)
Dept: ADMINISTRATIVE | Facility: OTHER | Age: 74
End: 2019-08-28

## 2019-08-28 ENCOUNTER — AMBULATORY - HEALTHEAST (OUTPATIENT)
Dept: ENDOCRINOLOGY | Facility: CLINIC | Age: 74
End: 2019-08-28

## 2019-08-28 ENCOUNTER — COMMUNICATION - HEALTHEAST (OUTPATIENT)
Dept: ADMINISTRATIVE | Facility: CLINIC | Age: 74
End: 2019-08-28

## 2019-08-28 DIAGNOSIS — M81.0 SENILE OSTEOPOROSIS: ICD-10-CM

## 2019-09-30 ENCOUNTER — RECORDS - HEALTHEAST (OUTPATIENT)
Dept: ADMINISTRATIVE | Facility: OTHER | Age: 74
End: 2019-09-30

## 2020-02-17 ENCOUNTER — COMMUNICATION - HEALTHEAST (OUTPATIENT)
Dept: LAB | Facility: CLINIC | Age: 75
End: 2020-02-17

## 2020-02-17 DIAGNOSIS — E55.9 VITAMIN D DEFICIENCY: ICD-10-CM

## 2020-02-17 DIAGNOSIS — M81.0 SENILE OSTEOPOROSIS: ICD-10-CM

## 2020-02-21 ENCOUNTER — COMMUNICATION - HEALTHEAST (OUTPATIENT)
Dept: ENDOCRINOLOGY | Facility: CLINIC | Age: 75
End: 2020-02-21

## 2020-02-21 DIAGNOSIS — M81.0 SENILE OSTEOPOROSIS: ICD-10-CM

## 2020-02-25 ENCOUNTER — AMBULATORY - HEALTHEAST (OUTPATIENT)
Dept: LAB | Facility: CLINIC | Age: 75
End: 2020-02-25

## 2020-02-25 DIAGNOSIS — M81.0 SENILE OSTEOPOROSIS: ICD-10-CM

## 2020-02-25 DIAGNOSIS — E55.9 VITAMIN D DEFICIENCY: ICD-10-CM

## 2020-02-25 LAB — CALCIUM SERPL-MCNC: 10.1 MG/DL (ref 8.5–10.5)

## 2020-02-26 LAB — 25(OH)D3 SERPL-MCNC: 38.5 NG/ML (ref 30–80)

## 2020-03-03 ENCOUNTER — OFFICE VISIT - HEALTHEAST (OUTPATIENT)
Dept: ENDOCRINOLOGY | Facility: CLINIC | Age: 75
End: 2020-03-03

## 2020-03-03 DIAGNOSIS — M81.0 SENILE OSTEOPOROSIS: ICD-10-CM

## 2020-03-03 ASSESSMENT — MIFFLIN-ST. JEOR: SCORE: 1075.1

## 2020-07-01 ENCOUNTER — COMMUNICATION - HEALTHEAST (OUTPATIENT)
Dept: NEUROSURGERY | Facility: CLINIC | Age: 75
End: 2020-07-01

## 2020-07-01 DIAGNOSIS — Z98.1 S/P LUMBAR FUSION: ICD-10-CM

## 2020-07-02 ENCOUNTER — HOSPITAL ENCOUNTER (OUTPATIENT)
Dept: CT IMAGING | Facility: CLINIC | Age: 75
Discharge: HOME | End: 2020-07-02
Attending: NEUROLOGICAL SURGERY

## 2020-07-02 DIAGNOSIS — Z98.1 S/P LUMBAR FUSION: ICD-10-CM

## 2020-07-06 ENCOUNTER — OFFICE VISIT - HEALTHEAST (OUTPATIENT)
Dept: NEUROSURGERY | Facility: CLINIC | Age: 75
End: 2020-07-06

## 2020-07-06 DIAGNOSIS — M48.062 SPINAL STENOSIS OF LUMBAR REGION WITH NEUROGENIC CLAUDICATION: ICD-10-CM

## 2020-07-06 ASSESSMENT — MIFFLIN-ST. JEOR: SCORE: 1073.29

## 2020-07-14 ENCOUNTER — HOSPITAL ENCOUNTER (OUTPATIENT)
Dept: RADIOLOGY | Facility: CLINIC | Age: 75
Discharge: HOME | End: 2020-07-14
Attending: NEUROLOGICAL SURGERY

## 2020-07-14 ENCOUNTER — HOSPITAL ENCOUNTER (OUTPATIENT)
Dept: MRI IMAGING | Facility: CLINIC | Age: 75
Discharge: HOME | End: 2020-07-14
Attending: NEUROLOGICAL SURGERY

## 2020-07-14 DIAGNOSIS — M48.062 SPINAL STENOSIS OF LUMBAR REGION WITH NEUROGENIC CLAUDICATION: ICD-10-CM

## 2020-07-14 LAB
CREAT BLD-MCNC: 1 MG/DL (ref 0.6–1.1)
GFR SERPL CREATININE-BSD FRML MDRD: 54 ML/MIN/1.73M2

## 2020-07-22 ENCOUNTER — OFFICE VISIT - HEALTHEAST (OUTPATIENT)
Dept: NEUROSURGERY | Facility: CLINIC | Age: 75
End: 2020-07-22

## 2020-07-22 DIAGNOSIS — M81.0 SENILE OSTEOPOROSIS: ICD-10-CM

## 2020-07-22 DIAGNOSIS — S32.009K LUMBAR PSEUDOARTHROSIS: ICD-10-CM

## 2020-07-22 ASSESSMENT — MIFFLIN-ST. JEOR: SCORE: 1073.29

## 2020-07-23 ENCOUNTER — COMMUNICATION - HEALTHEAST (OUTPATIENT)
Dept: NEUROSURGERY | Facility: CLINIC | Age: 75
End: 2020-07-23

## 2020-08-03 ENCOUNTER — COMMUNICATION - HEALTHEAST (OUTPATIENT)
Dept: NEUROSURGERY | Facility: CLINIC | Age: 75
End: 2020-08-03

## 2021-02-04 ENCOUNTER — AMBULATORY - HEALTHEAST (OUTPATIENT)
Dept: ENDOCRINOLOGY | Facility: CLINIC | Age: 76
End: 2021-02-04

## 2021-02-04 DIAGNOSIS — M81.0 SENILE OSTEOPOROSIS: ICD-10-CM

## 2021-02-24 ENCOUNTER — AMBULATORY - HEALTHEAST (OUTPATIENT)
Dept: LAB | Facility: CLINIC | Age: 76
End: 2021-02-24

## 2021-02-24 DIAGNOSIS — M81.0 SENILE OSTEOPOROSIS: ICD-10-CM

## 2021-02-24 LAB — CALCIUM SERPL-MCNC: 9.5 MG/DL (ref 8.5–10.5)

## 2021-02-25 ENCOUNTER — COMMUNICATION - HEALTHEAST (OUTPATIENT)
Dept: ENDOCRINOLOGY | Facility: CLINIC | Age: 76
End: 2021-02-25

## 2021-02-25 LAB — 25(OH)D3 SERPL-MCNC: 30.9 NG/ML (ref 30–80)

## 2021-02-26 ENCOUNTER — COMMUNICATION - HEALTHEAST (OUTPATIENT)
Dept: ENDOCRINOLOGY | Facility: CLINIC | Age: 76
End: 2021-02-26

## 2021-03-03 ENCOUNTER — OFFICE VISIT - HEALTHEAST (OUTPATIENT)
Dept: ENDOCRINOLOGY | Facility: CLINIC | Age: 76
End: 2021-03-03

## 2021-03-03 DIAGNOSIS — M81.0 SENILE OSTEOPOROSIS: ICD-10-CM

## 2021-03-03 RX ORDER — ALENDRONATE SODIUM 70 MG/1
TABLET ORAL
Qty: 12 TABLET | Refills: 3 | Status: SHIPPED | OUTPATIENT
Start: 2021-03-03 | End: 2022-05-10

## 2021-03-03 RX ORDER — LISINOPRIL 40 MG/1
40 TABLET ORAL DAILY
Status: SHIPPED | COMMUNITY
Start: 2020-12-28

## 2021-03-10 ENCOUNTER — COMMUNICATION - HEALTHEAST (OUTPATIENT)
Dept: NEUROSURGERY | Facility: CLINIC | Age: 76
End: 2021-03-10

## 2021-03-19 ENCOUNTER — OFFICE VISIT - HEALTHEAST (OUTPATIENT)
Dept: NEUROSURGERY | Facility: CLINIC | Age: 76
End: 2021-03-19

## 2021-03-19 DIAGNOSIS — M81.0 SENILE OSTEOPOROSIS: ICD-10-CM

## 2021-03-19 DIAGNOSIS — M43.8X9 SAGITTAL PLANE IMBALANCE: ICD-10-CM

## 2021-03-19 DIAGNOSIS — S32.009K PSEUDOARTHROSIS OF LUMBAR SPINE: ICD-10-CM

## 2021-03-19 DIAGNOSIS — M47.26 OSTEOARTHRITIS OF SPINE WITH RADICULOPATHY, LUMBAR REGION: ICD-10-CM

## 2021-04-29 ENCOUNTER — AMBULATORY - HEALTHEAST (OUTPATIENT)
Dept: GERIATRICS | Facility: CLINIC | Age: 76
End: 2021-04-29

## 2021-04-29 ENCOUNTER — COMMUNICATION - HEALTHEAST (OUTPATIENT)
Dept: GERIATRICS | Facility: CLINIC | Age: 76
End: 2021-04-29

## 2021-04-29 ENCOUNTER — OFFICE VISIT - HEALTHEAST (OUTPATIENT)
Dept: GERIATRICS | Facility: CLINIC | Age: 76
End: 2021-04-29

## 2021-04-29 ENCOUNTER — RECORDS - HEALTHEAST (OUTPATIENT)
Dept: LAB | Facility: CLINIC | Age: 76
End: 2021-04-29

## 2021-04-29 DIAGNOSIS — I25.83 CORONARY ARTERY DISEASE DUE TO LIPID RICH PLAQUE: ICD-10-CM

## 2021-04-29 DIAGNOSIS — Z96.651 S/P TKR (TOTAL KNEE REPLACEMENT), RIGHT: ICD-10-CM

## 2021-04-29 DIAGNOSIS — N18.31 STAGE 3A CHRONIC KIDNEY DISEASE (H): ICD-10-CM

## 2021-04-29 DIAGNOSIS — I10 ESSENTIAL HYPERTENSION: ICD-10-CM

## 2021-04-29 DIAGNOSIS — I25.10 CORONARY ARTERY DISEASE DUE TO LIPID RICH PLAQUE: ICD-10-CM

## 2021-04-29 DIAGNOSIS — M54.16 LUMBAR RADICULOPATHY: ICD-10-CM

## 2021-04-29 DIAGNOSIS — D50.9 IRON DEFICIENCY ANEMIA, UNSPECIFIED IRON DEFICIENCY ANEMIA TYPE: ICD-10-CM

## 2021-04-29 DIAGNOSIS — I73.9 PERIPHERAL VASCULAR DISEASE (H): ICD-10-CM

## 2021-04-29 RX ORDER — HYDROCHLOROTHIAZIDE 25 MG/1
25 TABLET ORAL DAILY
Status: SHIPPED | COMMUNITY
Start: 2021-04-29

## 2021-04-29 RX ORDER — ACETAMINOPHEN 500 MG
1000 TABLET ORAL EVERY 6 HOURS
Status: SHIPPED | COMMUNITY
Start: 2021-04-29

## 2021-04-29 RX ORDER — HYDROCODONE BITARTRATE AND ACETAMINOPHEN 5; 325 MG/1; MG/1
1-2 TABLET ORAL EVERY 4 HOURS PRN
Qty: 168 TABLET | Refills: 0 | Status: SHIPPED | OUTPATIENT
Start: 2021-04-29 | End: 2022-02-22

## 2021-04-30 ENCOUNTER — OFFICE VISIT - HEALTHEAST (OUTPATIENT)
Dept: GERIATRICS | Facility: CLINIC | Age: 76
End: 2021-04-30

## 2021-04-30 DIAGNOSIS — I10 ESSENTIAL HYPERTENSION: ICD-10-CM

## 2021-04-30 DIAGNOSIS — I25.83 CORONARY ARTERY DISEASE DUE TO LIPID RICH PLAQUE: ICD-10-CM

## 2021-04-30 DIAGNOSIS — Z96.651 S/P TKR (TOTAL KNEE REPLACEMENT), RIGHT: ICD-10-CM

## 2021-04-30 DIAGNOSIS — I73.9 PERIPHERAL VASCULAR DISEASE (H): ICD-10-CM

## 2021-04-30 DIAGNOSIS — N18.31 STAGE 3A CHRONIC KIDNEY DISEASE (H): ICD-10-CM

## 2021-04-30 DIAGNOSIS — I25.10 CORONARY ARTERY DISEASE DUE TO LIPID RICH PLAQUE: ICD-10-CM

## 2021-04-30 DIAGNOSIS — D50.9 IRON DEFICIENCY ANEMIA, UNSPECIFIED IRON DEFICIENCY ANEMIA TYPE: ICD-10-CM

## 2021-04-30 DIAGNOSIS — R52 PAIN MANAGEMENT: ICD-10-CM

## 2021-04-30 LAB
ANION GAP SERPL CALCULATED.3IONS-SCNC: 9 MMOL/L (ref 5–18)
BUN SERPL-MCNC: 20 MG/DL (ref 8–28)
CALCIUM SERPL-MCNC: 10 MG/DL (ref 8.5–10.5)
CHLORIDE BLD-SCNC: 98 MMOL/L (ref 98–107)
CO2 SERPL-SCNC: 30 MMOL/L (ref 22–31)
CREAT SERPL-MCNC: 0.79 MG/DL (ref 0.6–1.1)
ERYTHROCYTE [DISTWIDTH] IN BLOOD BY AUTOMATED COUNT: 14 % (ref 11–14.5)
GFR SERPL CREATININE-BSD FRML MDRD: >60 ML/MIN/1.73M2
GLUCOSE BLD-MCNC: 96 MG/DL (ref 70–125)
HCT VFR BLD AUTO: 35.5 % (ref 35–47)
HGB BLD-MCNC: 11.4 G/DL (ref 12–16)
MCH RBC QN AUTO: 30.4 PG (ref 27–34)
MCHC RBC AUTO-ENTMCNC: 32.1 G/DL (ref 32–36)
MCV RBC AUTO: 95 FL (ref 80–100)
PLATELET # BLD AUTO: 225 THOU/UL (ref 140–440)
PMV BLD AUTO: 11.4 FL (ref 8.5–12.5)
POTASSIUM BLD-SCNC: 4.1 MMOL/L (ref 3.5–5)
RBC # BLD AUTO: 3.75 MILL/UL (ref 3.8–5.4)
SODIUM SERPL-SCNC: 137 MMOL/L (ref 136–145)
WBC: 10.6 THOU/UL (ref 4–11)

## 2021-05-01 ENCOUNTER — RECORDS - HEALTHEAST (OUTPATIENT)
Dept: LAB | Facility: CLINIC | Age: 76
End: 2021-05-01

## 2021-05-03 LAB — HGB BLD-MCNC: 10.8 G/DL (ref 12–16)

## 2021-05-04 ENCOUNTER — OFFICE VISIT - HEALTHEAST (OUTPATIENT)
Dept: GERIATRICS | Facility: CLINIC | Age: 76
End: 2021-05-04

## 2021-05-04 DIAGNOSIS — I25.83 CORONARY ARTERY DISEASE DUE TO LIPID RICH PLAQUE: ICD-10-CM

## 2021-05-04 DIAGNOSIS — N18.31 STAGE 3A CHRONIC KIDNEY DISEASE (H): ICD-10-CM

## 2021-05-04 DIAGNOSIS — Z96.651 S/P TKR (TOTAL KNEE REPLACEMENT), RIGHT: ICD-10-CM

## 2021-05-04 DIAGNOSIS — I25.10 CORONARY ARTERY DISEASE DUE TO LIPID RICH PLAQUE: ICD-10-CM

## 2021-05-04 DIAGNOSIS — D50.9 IRON DEFICIENCY ANEMIA, UNSPECIFIED IRON DEFICIENCY ANEMIA TYPE: ICD-10-CM

## 2021-05-04 DIAGNOSIS — I10 ESSENTIAL HYPERTENSION: ICD-10-CM

## 2021-05-06 ENCOUNTER — OFFICE VISIT - HEALTHEAST (OUTPATIENT)
Dept: GERIATRICS | Facility: CLINIC | Age: 76
End: 2021-05-06

## 2021-05-06 DIAGNOSIS — N18.31 STAGE 3A CHRONIC KIDNEY DISEASE (H): ICD-10-CM

## 2021-05-06 DIAGNOSIS — I25.83 CORONARY ARTERY DISEASE DUE TO LIPID RICH PLAQUE: ICD-10-CM

## 2021-05-06 DIAGNOSIS — I10 ESSENTIAL HYPERTENSION: ICD-10-CM

## 2021-05-06 DIAGNOSIS — I25.10 CORONARY ARTERY DISEASE DUE TO LIPID RICH PLAQUE: ICD-10-CM

## 2021-05-06 DIAGNOSIS — D50.9 IRON DEFICIENCY ANEMIA, UNSPECIFIED IRON DEFICIENCY ANEMIA TYPE: ICD-10-CM

## 2021-05-06 DIAGNOSIS — I73.9 PERIPHERAL VASCULAR DISEASE (H): ICD-10-CM

## 2021-05-06 DIAGNOSIS — Z96.651 S/P TKR (TOTAL KNEE REPLACEMENT), RIGHT: ICD-10-CM

## 2021-05-10 ENCOUNTER — AMBULATORY - HEALTHEAST (OUTPATIENT)
Dept: GERIATRICS | Facility: CLINIC | Age: 76
End: 2021-05-10

## 2021-05-27 VITALS
HEART RATE: 63 BPM | TEMPERATURE: 97.3 F | SYSTOLIC BLOOD PRESSURE: 136 MMHG | RESPIRATION RATE: 18 BRPM | OXYGEN SATURATION: 90 % | DIASTOLIC BLOOD PRESSURE: 63 MMHG

## 2021-05-27 NOTE — TELEPHONE ENCOUNTER
Patient calling wondering if we can refill her hydrocodone until she sees Dr. Walsh on 5/21. Please call her back at [phone]. It is ok to leave her a message.

## 2021-05-29 NOTE — PROGRESS NOTES
Neurosurgery Progress Note  05/21/19    A/P: Susan Patrick is a 73 y.o. female the past medical history significant for osteoporosis (completed Forteo, now on Fosamax) status post revision of a prior L3-5 instrumented fusion with L4-5 pseudoarthrosis with subsequent L4-5, L5-S1 instrumented TLIF on 11/9/2018    Progressing well.  Brace as needed.  Follow-up in 6 months with a CT scan of the lumbar spine.  Patient will call when she needs a refill on her pain medication (gabapentin, hydrocodone)    S: Overall progressing well. Feels her back pain is improving over time. Has good days and bad. Back pain is exacerbated by forward flexion, bending and prolonged walking. Using cane for prolonged walking due to imbalance- has hx of cervical laminoplasty with Dr. Dumont.    PMH: No interval change  PSH: No interval change    ROS: Denies any numbness, tingling or weakness. No radiating pain in the LE. A full 14 point review of systems was otherwise completed and is negative aside from that mentioned above in the HPI    O:  Vitals:    05/21/19 1258   BP: 152/61   Pulse: 62   Resp: 16   SpO2: 96%     General: Awake, alert, NAD  HEENT: AT/NC, EOMI, face symmetric, oral mucosa moist and pink  Heart: RRR  Lungs: Nonlabored respirations without accessory muscle use.  Neuro: Awake, alert, speech is clear and content is appropriate. MAEW x 4 with full strength in b/l UE and LE with the exception of subtle left EHL weakness 4+/5. Sensation is intact to temperature and LT. Pinprick is diminished bilaterally throughout the foot as well as along the anterior and latera calf.  Coordination: Gait antalgic  Reflexes: 3+ patellar and left achilles. Right achilles 2+. 1-2 beats of clonus. Toes downgoing bilaterally.  Musculoskeletal: Negative straight leg raise.  Psych: Appropriate mood and affect, pleasant, cooperative, alert and oriented x 3  Skin: No obvious rash or lesion    I personally reviewed and visualized the following  radiographic images:  Lumbar spine XR 5/21/2019: Stable L3-S1 instrumentation without evidence of interval complication.    Kaila Walsh MD  05/21/19

## 2021-05-29 NOTE — TELEPHONE ENCOUNTER
Called pt and informed her that Dr. Walsh was not in clinic today to ask about refill. Pt okayed to wait till next week.   CONOR Rodrigez

## 2021-05-29 NOTE — PROGRESS NOTES
Susan Patrick presents today for a follow up for lumbar radiculopathy. She states she does have pain in her back that is bothersome more so when bending over or trying to get back up from bending over. She states this pain comes and goes and today rates it a 7/10. She denies any gait/balance changes, weakness, bowel/bladder changes or numbness/tingling.  Vinod Mcdowell LPN

## 2021-05-29 NOTE — TELEPHONE ENCOUNTER
Sharon called today to request a refill for her Hydrocodone that Dr. Walsh told her to call the office for that refill. Also Susan gave paperwork for  [employer] and was wondering where that was or if it was sent in for her. Please call Susan back at [phone]

## 2021-05-30 VITALS — BODY MASS INDEX: 26.52 KG/M2 | WEIGHT: 145 LBS

## 2021-05-31 VITALS — HEIGHT: 62 IN | BODY MASS INDEX: 27.71 KG/M2 | WEIGHT: 150.6 LBS

## 2021-05-31 VITALS — HEIGHT: 62 IN | BODY MASS INDEX: 26.79 KG/M2 | WEIGHT: 145.6 LBS

## 2021-05-31 VITALS — WEIGHT: 146.4 LBS | BODY MASS INDEX: 26.94 KG/M2 | HEIGHT: 62 IN

## 2021-05-31 VITALS — BODY MASS INDEX: 26.68 KG/M2 | HEIGHT: 62 IN | WEIGHT: 145 LBS

## 2021-05-31 VITALS — WEIGHT: 145 LBS | HEIGHT: 62 IN | BODY MASS INDEX: 26.68 KG/M2

## 2021-05-31 VITALS — WEIGHT: 145.2 LBS | BODY MASS INDEX: 26.56 KG/M2

## 2021-05-31 NOTE — TELEPHONE ENCOUNTER
[facility] Team    Provider placed an order for patient to complete a DEXA prior to appt. Patient states that she tried to complete it, but was notified that her insurance will not cover for another one this year.     Ok to wait until next year for insurance to cover? Or do we want to do a PA and have pt complete DEXA still?    Pt notified that if we do NOT contact her, it would mean that DEXA can wait. & that if we DO contact her, we do want her to complete a DEXA.    She can be reached @ [phone].

## 2021-06-01 VITALS — HEIGHT: 62 IN | BODY MASS INDEX: 27.6 KG/M2 | WEIGHT: 150 LBS

## 2021-06-02 VITALS — BODY MASS INDEX: 28.1 KG/M2 | HEIGHT: 62 IN | WEIGHT: 152.7 LBS

## 2021-06-02 VITALS — BODY MASS INDEX: 26 KG/M2 | WEIGHT: 141.3 LBS | HEIGHT: 62 IN

## 2021-06-02 VITALS — WEIGHT: 141 LBS | BODY MASS INDEX: 25.95 KG/M2 | HEIGHT: 62 IN

## 2021-06-02 VITALS — HEIGHT: 62 IN | WEIGHT: 141 LBS | BODY MASS INDEX: 25.95 KG/M2

## 2021-06-04 VITALS
SYSTOLIC BLOOD PRESSURE: 120 MMHG | HEIGHT: 62 IN | BODY MASS INDEX: 25.83 KG/M2 | WEIGHT: 140.4 LBS | HEART RATE: 80 BPM | DIASTOLIC BLOOD PRESSURE: 62 MMHG

## 2021-06-04 VITALS
RESPIRATION RATE: 16 BRPM | BODY MASS INDEX: 25.76 KG/M2 | HEIGHT: 62 IN | WEIGHT: 140 LBS | SYSTOLIC BLOOD PRESSURE: 111 MMHG | OXYGEN SATURATION: 100 % | DIASTOLIC BLOOD PRESSURE: 55 MMHG | HEART RATE: 60 BPM

## 2021-06-04 VITALS
DIASTOLIC BLOOD PRESSURE: 58 MMHG | HEIGHT: 62 IN | OXYGEN SATURATION: 98 % | HEART RATE: 73 BPM | SYSTOLIC BLOOD PRESSURE: 157 MMHG | BODY MASS INDEX: 25.76 KG/M2 | WEIGHT: 140 LBS

## 2021-06-06 NOTE — PROGRESS NOTES
[facility]  ENDOCRINOLOGY    Osteoporosis Follow Up 3/5/2020    Susan Patrick, 1945, [id number]          Reason for visit      1. Senile osteoporosis        History     Susan Patrick is a very pleasant 74 y.o. old female who presents for follow up.   SUMMARY:  Diagnosed with osteoporosis  In .   DXA scan is noted below indicating a lowest T-score of -2.7 at the left femoral neck. Started on reclast after this DXA scan- no prior DXA scan at this facility is 1 from [employer] noted below..   Current and prior treatments include Reclast x2 doses  Risk factors include age, gender, , early menopause, former smoker  The following high- risk conditions have been ruled out: celiac disease, eating disorders, gastric bypass, hyperparathyroidism, inflammatory bowel disease, hyperthyroidism, rheumatoid arthritis, lupus, chronic kidney disease.  She is not on high risk medications such as glucocorticoids, anti-coagulants, anti-convulsants, chemotherapy or levothyroxine.   Personal or family history of kidney stones is negative.  Family history of osteoporosis is Unknown- mother  young-47     Calcium intake 650mg calcium supplements;16oz of milk daily as well. Occasional cheese and yogurt- 4x/week  Vitamin D intake 4000 IU D3 ( 2x 2000IU daily)  Weight bearing exercise Unable  Denied tobacco use- former smoker quit in .   Denies alcohol use  There is a stress fracture across the L2 pedicles and she is here to determine next steps in treatment.  Back surgery was in .  In 2014 she saw Dr. Denise at [facility] and was advised to start on treatment but did not do so immediately.  Prior to that she saw another physician Dr. Stout who recommended Fosamax in  as osteoporosis had already been diagnosed.  She therefore went untreated for at least 2 years.  She does not give a particular reason for not taking medications.  They lumbar fracture appears to have been atraumatic.  She's not aware of  any family history of osteoporosis.  She has received at least one cortisone injection through Hospital in her back.  She also has right knee arthritis which has been giving her limitations in mobility.  Prior fractures in 2013 include her falling off a step and fracturing her wrist in her right hip.       TODAY:  Susan returns today in f/u for Osteoporosis. She has been on Alendronate now for almost two years. She completed 2 years on Forteo and Dexa showed improvement. She is due for her next one this year. She reports no difficulties with the Alendronate. Last Dexa Scan shows: 72 y.o. female with OSTEOPOROSIS which would be judged as severe given her previous fracture history.  BMD changes since 2016 reflect a good response to current management.  Current Vit D level is 38.5 and Calcium level is 10.1    Risk Factors     The following high- risk conditions have been ruled out: celiac disease, eating disorders, gastric bypass, hyperparathyroidism, inflammatory bowel disease, hyperthyroidism, rheumatoid arthritis, lupus, chronic kidney disease.     Susan Patrick has the following risk factors: Age, Female gender,  and Low BMI, hx of smoking     She is not on high risk medications such as glucocorticoids, anti-coagulants, anti-convulsants, chemotherapy or levothyroxine.    Patient deniesBreast cancer and Family history of breast cancer.      Menopause was at age: 33 years.  Hysterectomy at age 30 but no ovariectomy.No HRT.  Height loss of 4 inches    Past Medical History     Patient Active Problem List   Diagnosis     Pseudoarthrosis of lumbar spine     Low back pain     Fracture of lumbar vertebra (H)     Vitamin D deficiency     Scheuermann's kyphosis     Nontraumatic compression fracture of T7 vertebra (H)     Cigarette nicotine dependence in remission     Acute pain of right hip     Kyphosis of thoracic region     Parathyroid adenoma     Hypertension, goal below 140/80     PVD (peripheral vascular  "disease) (H)     CVA (cerebral vascular accident) (H)     [place] esophagus     Major depression, recurrent (H)     CKD (chronic kidney disease) stage 3, GFR 30-59 ml/min (H)     Arthropathy of right hip     Status post total hip replacement, right     Cerebral infarction (H)     Depression with anxiety     Iron deficiency anemia     Lactose intolerance     Pain medication agreement     History of colonic polyps     S/P lumbar spinal fusion     Lumbar radiculopathy     Lumbar pseudoarthrosis     Postoperative anemia     Urinary retention with incomplete bladder emptying     Severe sepsis (H)     Acute cystitis without hematuria     Hypokalemia     LADARIUS (acute kidney injury) (H)     Bacteremia due to Escherichia coli     CAD (coronary artery disease)     Pseudophakia, both eyes     Senile osteoporosis     Ureteral stone     Xerostomia     ACP (advance care planning)     Fall at home       Family History       family history includes Cerebral aneurysm in her sister.    Social History      reports that she quit smoking about 14 years ago. She has a 7.50 pack-year smoking history. She has never used smokeless tobacco. She reports current alcohol use. She reports that she does not use drugs.      Review of Systems     Patient denies current pain, limited mobility, fractures.   Remainder per HPI.      Vital Signs     /62 (Patient Site: Right Arm, Patient Position: Sitting, Cuff Size: Adult Regular)   Pulse 80   Ht 5' 2\" (1.575 m)   Wt 140 lb 6.4 oz (63.7 kg)   BMI 25.68 kg/m      Physical Exam     GENERAL:  Normal, NIRD  EYES:  Pupils equal, round and reactive to light; no proptosis, lid lag or  periorbital edema.  THYROID:  Thyroid is normal.  No tenderness or bruit  NECK: No lymph nodes  MUSCULOSKELETAL: No joint abnormalities, FROM in all four extremities. No kyphosis. Muscle strength grossly normal without evidence of wasting.  HEART:  Regular rate and rhythm without murmur.  LUNGS:  Clear to " auscultation.  ABDOMEN:Soft, non-tender, no masses or organomegaly  NEURO:  Patella Reflexes were normal.No tremors  SKIN:  No acanthosis nigricans or vitiligo        Assessment     1. Senile osteoporosis        Plan     Susan is currently stable. She needs a new Dexa Scan and will call to schedule. She will f/u with me in 1 year.       Total visit minutes:25  Time spent counseling and coordination of care:23    Lena Friedman   Endocrinology  3/5/2020  6:56 AM          Current Medications     Outpatient Medications Prior to Visit   Medication Sig Dispense Refill     amLODIPine (NORVASC) 10 MG tablet Take 10 mg by mouth daily.       aspirin 81 mg chewable tablet Chew 81 mg daily.       calcium carbonate-vitamin D3 600 mg(1,500mg) -800 unit per tablet Take 1 tablet by mouth daily.       cholecalciferol, vitamin D3, 1,000 unit tablet Take 3,000 Units by mouth daily.       clopidogrel (PLAVIX) 75 mg tablet Take 75 mg by mouth daily.       esomeprazole magnesium (NEXIUM 24 HR) 22.3 mg capsule Take 22.3 mg by mouth every morning before breakfast.       ferrous sulfate 325 (65 FE) MG tablet Take 1 tablet by mouth twice a day with lunch and supper.        gabapentin (NEURONTIN) 100 MG capsule Take 100 mg by mouth 3 (three) times a day. Gradually taper up per your doctors instructions 90 capsule 3     lisinopril-hydrochlorothiazide (PRINZIDE,ZESTORETIC) 20-25 mg per tablet Take 1 tablet by mouth daily.  1     mirtazapine (REMERON) 30 MG tablet Take 30 mg by mouth bedtime.       MULTIVITAMIN ORAL Take 1 tablet by mouth daily.        pravastatin (PRAVACHOL) 40 MG tablet Take 40 mg by mouth bedtime.       sertraline (ZOLOFT) 100 MG tablet Take 150 mg by mouth daily.        alendronate (FOSAMAX) 70 MG tablet TAKE 1 TABLET BY MOUTH EVERY 7 DAYS. TAKE IN THE MORNING ON AN EMPTY STOMACH WITH A FULL GLASS OF WATER 30 MINUTES BEFORE FOOD. 4 tablet 0     acetaminophen (TYLENOL) 325 MG tablet Take 650 mg by mouth every 6 (six)  hours as needed for pain.       gabapentin (NEURONTIN) 300 MG capsule Take 1 capsule (300 mg total) by mouth 3 (three) times a day. 90 capsule 3     HYDROcodone-acetaminophen 5-325 mg per tablet Take 1-2 tablets by mouth every 8 (eight) hours as needed for pain. 30 tablet 0     No facility-administered medications prior to visit.          Lab Results     PTH   Date Value Ref Range Status   12/30/2016 23 10 - 86 pg/mL Final     Calcium   Date Value Ref Range Status   02/25/2020 10.1 8.5 - 10.5 mg/dL Final     Phosphorus   Date Value Ref Range Status   10/10/2017 2.0 (L) 2.5 - 4.5 mg/dL Final           Imaging Results   Last DEXA scan:  Results for orders placed in visit on 08/31/18   DXA Bone Density Scan    Narrative 8/31/2018      RE: Susan Patrick  YOB: 1945        Dear Kaila Miranda,    Patient Profile:  72 y.o. female, postmenopausal, is here for the follow up bone density   test.   History of fractures - Yes;  Wrist, Lumbar vertebrae and Hip. Family   history of osteoporosis - None.  Family history of hip fracture: None.   Smoking history - Past. Osteoporosis treatment past -  Yes;  Forteo.   Osteoporosis treatment current - Yes;  Bisphosphonates.  Chronic medical   problems - Height loss, Hip replacement , History of kidney stones,   Hysterectomy, Premature menopause and Spine surgery.  Also   hyperparathyroidism high risk medications -  None.    Assessment:    1. The spine bone density is best assessed using L1-L2 with T-score of   -0.1..  2. Femoral bone densities show osteoporosis with a left femoral neck T-   score of -2.7.  Bone density cannot be assessed on the right due to   previous right hip fracture with surgical repair  3.  Bone density was also checked in the wrist since the spine is of   suboptimal reliability and since the right hip could not be assessed.  In   the left 33% radius, low bone mass is seen with T score of -1.7.  4.  Since the scan in 2017, bone density has  increased a significant 5.8%   in the left total hip.   Bone density has not significantly changed at   L1-L2 of the AP spine.    72 y.o. female with OSTEOPOROSIS which would be judged as severe given her   previous fracture history.  BMD changes since 2016 reflect a good response   to current management.    Recommendations:  A continuation of current management would be advised with a recheck in 2   years.      Bone densitometry was performed on your patient using our TEAM INTERVAL iDCadigo   densitometer. The results are summarized and a copy of the actual scans   are included for your review. In conformity with the International Society   of Clinical Densitometry's most recent position statement for DXA   interpretation (2015), the diagnosis will be made on the lowest measured   T-score of the lumbar spine, femoral neck, total proximal femur or 33%   radius. Note the change in terminology for diagnostic classification from   OSTEOPENIA to LOW BONE MASS. All trending for sequential exams will be   done using multiple vertebrae or the total proximal femur. Fracture risk   is based on the WHO Fracture Risk Assessment Tool (FRAX). If additional   information is needed or if you would like to discuss the results, please   do not hesitate to call me.       Thank you for referring this patient to [facility].   We are happy to be of service in support of you and your practice. If you   have any questions or suggestions to improve our service, please call me   at [phone].     Sincerely,     Willis Connors M.D. C.CYVETTE.  Osteoporosis Services, [facility]

## 2021-06-08 NOTE — PROGRESS NOTES
[facility]  ENDOCRINOLOGY    Osteoporosis Follow Up 2017    Susan Patrick, 1945, [id number]          Reason for visit      1. Osteoporosis        History     Susan Patrick is a very pleasant 71 y.o. old female who presents for follow up.   SUMMARY:  Diagnosed with osteoporosis In .   DXA scan is noted below indicating a lowest T-score of -2.7 at the left femoral neck. Started on reclast after this DXA scan- no prior DXA scan at this facility is 1 from [employer] noted below..   Current and prior treatments include Reclast x2 doses  Risk factors include age, gender, , early menopause, former smoker  The following high- risk conditions have been ruled out: celiac disease, eating disorders, gastric bypass, hyperparathyroidism, inflammatory bowel disease, hyperthyroidism, rheumatoid arthritis, lupus, chronic kidney disease She is not on high risk medications such as glucocorticoids, anti-coagulants, anti-convulsants, chemotherapy or levothyroxine. Personal or family history of kidney stones is negative.  Family history of osteoporosis is Unknown- mother  young-47 Menopause was at age: 35 years. Hysterectomy at age 30 but no ovariectomy.No HRT. Height loss of 4 inches  Calcium intake 650mg calcium supplements;16oz of milk daily as well. Occasional cheese and yogurt- 4x/weekVitamin D intake 4000 IU D3 ( 2x 2000IU daily) Weight bearing exercise Unable  Denies tobacco use- former smoker quit in . Denies alcohol use   There is a stress fracture across the L2 pedicles and she is here to determine next steps in treatment.Back surgery was in .  In 2014 she saw Dr. Denise at [facility] and was advised to start on treatment but did not do so immediately. Prior to that she saw another physician Dr. Stout who recommended Fosamax in  as osteoporosis had already been diagnosed. She therefore went untreated for at least 2 years. She does not give a particular reason for not taking  medications. They lumbar fracture appears to have been atraumatic. She's not aware of any family history of osteoporosis.  She has received at least one cortisone injection through Hospital in her back. She also has right knee arthritis which has been giving her limitations in mobility.  Prior fractures in 2013 include her falling off a step and fracturing her wrist in her right hip.    TODAY:  Susan is here today to check in while she is on Forteo therapy.  She reports that she is not wearing her brace anymore which makes her very happy.  She does have back pain most of the time.  She has had recent xrays of her R hip because it has been causing her some pain. She will be starting PT for it soon.  She has also had an odd sensation recently of having hair in her mouth.  She states that the only thing that really takes the sensation away is Stephania's Magic Mouthwash.  She continues to use the Forteo daily with no difficulty. She is also taking Calcium and Vit D supplementation.    Risk Factors     The following high- risk conditions have been ruled out: celiac disease, eating disorders, gastric bypass, hyperparathyroidism, inflammatory bowel disease, hyperthyroidism, rheumatoid arthritis, lupus, chronic kidney disease.     Susan Patrick has the following risk factors: Age, Female gender,  and Family history of osteoporosis     She is not on high risk medications such as glucocorticoids, anti-coagulants, anti-convulsants, chemotherapy or levothyroxine.    Patient deniesOophrectomy, Breast cancer and Family history of breast cancer.   Menopause was at age: 33 years.     Past Medical History     Patient Active Problem List   Diagnosis     Pseudoarthrosis of lumbar spine     Low back pain     L2 vertebral fracture, with delayed healing, subsequent encounter     Osteoporosis     Vitamin D deficiency     Scheuermann's kyphosis     Non-traumatic compression fracture of T7 thoracic vertebra     Cigarette nicotine  dependence in remission     Acute pain of right hip     Kyphosis of thoracic region     Parathyroid adenoma     Hypertension, goal below 140/80     Peripheral vascular disease of extremity with claudication     CVA (cerebral vascular accident)     Barahona's esophagus     Major depression     CKD (chronic kidney disease)       Family History       family history includes Cerebral aneurysm in her sister.    Social History      reports that she quit smoking about 10 years ago. She has a 7.50 pack-year smoking history. She has never used smokeless tobacco. She reports that she drinks alcohol. She reports that she does not use illicit drugs.      Review of Systems     Patient denies current pain, limited mobility, fractures.   Remainder per HPI.      Vital Signs     Visit Vitals     /84     Pulse 74     Wt 145 lb (65.8 kg)     BMI 26.52 kg/m2       Physical Exam     GENERAL:  Normal, NIRD  EYES:  Pupils equal, round and reactive to light; no proptosis, lid lag or  periorbital edema.  THYROID:  Thyroid is normal.  No tenderness or bruit  NECK: No lymph nodes  MUSCULOSKELETAL: No joint abnormalities, FROM in all four extremities. No kyphosis. Muscle strength grossly normal without evidence of wasting.  HEART:  Regular rate and rhythm without murmur.  LUNGS:  Clear to auscultation.  ABDOMEN:Soft, non-tender, no masses or organomegaly  NEURO:  Patella Reflexes were normal.No tremors  SKIN:  No acanthosis nigricans or vitiligo        Assessment     1. Osteoporosis        Plan       Continue with the Forteo as you have been.  F/u in 6 months.    Total visit minutes:25  Time spent counseling and coordination of care:23    Lena ALBERT Endocrin[facility]  2/5/2017  1:12 PM      Current Medications     Outpatient Medications Prior to Visit   Medication Sig Dispense Refill     acetaminophen (TYLENOL) 500 MG tablet Take 1,000 mg by mouth every 6 (six) hours as needed for pain.       alum/mag  hydrox-simethicone-diphenhydramine-lidocaine (MAGIC MOUTHWASH) suspension Swish and spit 15 mL 4 (four) times a day as needed.       amLODIPine (NORVASC) 10 MG tablet Take 10 mg by mouth daily.       aspirin 81 MG EC tablet Take 81 mg by mouth daily.       calcium carbonate-vitamin D3 600 mg(1,500mg) -800 unit per tablet Take 1 tablet by mouth daily.       cholecalciferol, vitamin D3, 1,000 unit tablet Take 3,000 Units by mouth daily.       clopidogrel (PLAVIX) 75 mg tablet Take 75 mg by mouth daily.       esomeprazole magnesium (NEXIUM 24 HR) 22.3 mg capsule Take 22.3 mg by mouth every morning before breakfast.       ferrous sulfate 325 (65 FE) MG tablet Take 1 tablet by mouth daily with lunch.        MULTIVITAMIN ORAL Take by mouth.       pravastatin (PRAVACHOL) 40 MG tablet Take 40 mg by mouth bedtime.       sertraline (ZOLOFT) 100 MG tablet Take 150 mg by mouth daily.        teriparatide (FORTEO) 20 mcg/dose - 600 mcg/2.4 mL injection Inject 0.08 mL (20 mcg total) under the skin daily. 2.4 mL 6     cloNIDine HCl (CATAPRES) 0.2 MG tablet Take 0.1 mg by mouth daily. 1/2 tab       mirtazapine (REMERON) 30 MG tablet Take 30 mg by mouth bedtime.       No facility-administered medications prior to visit.          Lab Results     PTH   Date Value Ref Range Status   12/30/2016 23 10 - 86 pg/mL Final     CALCIUM   Date Value Ref Range Status   12/30/2016 9.1 8.5 - 10.5 mg/dL Final     PHOSPHORUS   Date Value Ref Range Status   04/27/2016 2.9 2.5 - 4.5 mg/dL Final           Imaging Results   Last DEXA scan:  Results for orders placed in visit on 04/11/16   DXA Bone Density Scan    Narrative BONE MINERAL DENSITY BY DXA SCANNING 4/11/16    Bone densitometry was performed on your patient, and the results are   summarized below;    OSTEOPOROSIS RISK FACTORS:  Postmenopausal status with early menopause.  Previous fractures of the   wrist and hip.  Vertebral fracture.  4 inch height loss     TREATMENT:  Reclast for two years.   Calcium with vitamin D.    ASSESSMENT:  Bone density measurements indicate OSTEOPOROSIS based on the lowest bone   mineral density of 0.667 g/cm2  and a T-score of -2.7  as measured at the   left femoral neck.  Bone density could not be checked in the right hip due   to total hip replacement.  Measurement in the AP spine is limited by   postsurgical change at L3-4.  She does not have a previous scan available   for comparison.  Assuming that her fractures were fragility fractures, her   diagnosis would be severe osteoporosis.  This would place her in a even   higher risk category for future fracture.    Vertebral fracture analysis was done due to height loss.  Study appeared   adequate for assessment from L2-T10 with suboptimal visualization of the   upper thoracic area.  Surgical hardware is noted at L3-L5.  No fractures   are noted from L2-T10.  Possible compression fractures are noted at T9 and   T8.  However, the upper thoracic area is incompletely visualized.  If   clinically appropriate, plain thoracic films could be done for further   evaluation.    RECOMMENDATIONS:  Predicted future fracture risk is high.  A continuation of current   management would be reasonable with a recheck in 2 years.  Given her   multiple fractures, a careful evaluation for secondary causes of low bone   density would be advised if it has not already been done.    Bone densitometry was performed on your patient using our [employer]   densitometer. The results are summarized and a copy of the actual scans   are included for your review. In conformity with the International Society   of Clinical Densitometry's most recent position statement for DXA   interpretation (2007), the diagnosis will be made on the lowest measured   T-score of the lumbar spine, femoral neck, total proximal femur or 33%   radius. Note the change in terminology for diagnostic classification from   OSTEOPENIA to LOW BONE MASS. All trending for sequential exams  will be   done using multiple vertebrae or the total proximal femur. If additional   information is needed or if you would like to discuss the results, please   do not hesitate to call me.

## 2021-06-09 NOTE — TELEPHONE ENCOUNTER
Patient calling stating she spoke with someone from our office who said she would need an xray prior to seeing Dr. Walsh. There is no xray order for her- can we review and see if she needs one? Any questions please call Susan at [phone]

## 2021-06-09 NOTE — PATIENT INSTRUCTIONS - HE
Dr. Walsh will discuss with [facility] your case and we will place a referral if they would like to see you.  At that time we will reach out to you if needed.

## 2021-06-09 NOTE — TELEPHONE ENCOUNTER
Patient calling because she had an appointment with Dr. Walsh yesterday and she still has many questions and is a little confused. Please call her at [phone]

## 2021-06-10 NOTE — TELEPHONE ENCOUNTER
Sharon called to follow up to find out if Dr. Gastelum was contacted and what the plan is for Susan. She was unsure if she was to see Dr. Walsh again or if she is being referred to Dr. Gastelum. Please call Susan with any updates. Best number to reach: [phone].

## 2021-06-11 NOTE — PROGRESS NOTES
Susan is here for a 6 month f/u. She is still currently taking forteo for 1 year now. She still c/o right lower back and hip pain. She did have thyroid surgery and bilateral cataracts done. She denies any numbness or weakness. She also has not been wearing herLSO brace.   DOMINGA today is 40%  JShowen,CMA

## 2021-06-11 NOTE — PROGRESS NOTES
Diagnoses and all orders for this visit:    Pseudoarthrosis of lumbar spine  -     XR Lumbar Spine 4 or More VWS; Future; Expected date: 6/13/17    Other closed fracture of second lumbar vertebra with delayed healing, subsequent encounter    Vitamin D deficiency  -     Vitamin D, Total (25-Hydroxy); Future    Scheuermann's kyphosis  -     XR Scoliosis AP and Lateral Standing; Future; Expected date: 10/13/17    Non-traumatic compression fracture of T7 thoracic vertebra, sequela    Arthropathy of right hip           It was a pleasure to evaluate Susan Patrick in followup at the kind referral of Dr. Stephania Berry for low back pain and prior fusion by Dr. Tri Dumont.      On initial evaluation by me, Susan had bilateral linear L2 pedicle fractures on CT lumbar spine at [facility], obtained for clarification of her prior MRI that suggested inflammation there. Her pedicle fracture morphology was atypical and I was concerned about worsening osteoporosis. She also has pseudoarthrosis of prior L3-L5 fusion that was performed by Dr. Dumont several years ago. I sent her for an LSO, which she has been wearing and which helps her back pain, and a DEXA scan.       The DEXA scan showed osteoporosis with a T score of -2.7, which is significantly low. She had been on Reclast therapy and failed this; I referred her urgently for Forteo treatment, we now have her on Forteo which is medically indicated due to new nontraumatic pathologic osteoporotic spine fractures while on Reclast and likely need for spine fusion surgery. I obtained records of a prior vitamin D level drawn July 2015, which was 17, and on repeat it was 25 on 5/8/16.  Dr. Rome increased her daily supplementation to 3000 IU messi D3 daily in June 2016.     Her vitamin D level remained low at 26 despite this aggressive supplementation; she has parathyroid hormone elevation and she had parathyroid adenoma on NM scan (performed after ultrasound suggested it).  We cannot get her vitamin D level where it needs to be to get bone healing for her unless this parathyroid adenoma is addressed. Therefore I referred her to Dr. Snowden for a parathyroid adenoma resection which was done 12/16. She needs to be following up with Endocrinology yearly about her osteoporosis and vitamin D deficiency.      She has a T7 compression fracture that is chronic on MRI 11/22/16, and L2 pedicle nontraumatic fractures, whose edema has now resolved on MRI 11/22/16, from her osteoporosis. She has nonunion of prior lumbar fusion with screw loosening seen. She has thoracic hyperkyphosis likely due to Scheuermann's kyphosis compounded by the prior T7 compression fracture. However, she now has minimal back pain with sitting.      She has stopped smoking as of Iris 10 2016 and I congratulated her for this.          For her significant right leg pain and some quadriceps weakness- this resolved for one week after intraarticular hip injection, indicating therefore this is secondary to pain from her right hip, even her back pain got a lot better after the intraarticular hip injection- the right hip joint is significantly pathologic on exam, with medial and lateral right femoral head rotation reproducing pain in right lateral low back. I sent her for right hip xrays due to history of prior femoral fracture which ruled out new fracture, then I sent her for a right hip injection to evaluate its effect on pain control- she kept a pain journal to tell me what effect right hip injection had on her overall back pain and right lateral leg pain. Susan reports (and gave us a copy of her pain journal after intraarticular hip injection which corroborates) that her low back and right leg pain were nearly gone after her right hip injection. The effect was immediate (she walked a long distance to her car without pain after injection, whereas she had terrible pain walking into injection) and lasted about seven days. I sent  her to see Dr. Ortiz at [facility] for right hip pathology, Dr. Ortiz is doing workup for potential right total hip arthroplasty and removal of existing samuel.        I will see her back in 6 months to assess her progress with low back pain with standing xrays and a vitamin D level. She does not need to wear her lumbar brace anymore unless low back pain worsens without it, then can wear prn.           I spent 30 minutes in patient care with greater than 50% spent in counseling and/or coordination of care.                      Subjective:   Patient ID: Susan Patrick is a 70 y.o. female.  HPI  Susan has had a parathyroidectomy with Dr. Snowden 12/16 for parathyroid adenoma since I last saw her.    Susan is a 70 y.o. female referred to me for severe low back pain, L2 osteoporotic pedicle fractures, pseudoarthrosis of prior L3-L5 instrumented fusion by Dr. Dumont; I made diagnosis of osteoporosis after I ordered DEXA scan, I have gotten her started on Forteo, I diagnosed a parathyroid adenoma and referred her to Dr. Snowden for resection, I diagnosed right hip arthropathy and referred her to Dr. Ortiz who is contemplating a total hip arthroplasty.      Susan has stopped smoking cigarettes after my extensive urging and has not smoked since August 2016 and is on Forteo since August 2016.      Susan has low back pain radiating across her lumbosacral junction, to her right lateral thigh. Pain exacerbated by staying in any one position too long. It was relieved 100% with right hip injection.    History of right femoral fracture with ORIF.  Prior L3-L5 instrumented fusion by Dr. Dumont several years ago  Prior hysterectomy and cholecystectomy anterior abdominal surgery  Denies new leg weakness, denies bowel/bladder changes.     At her 11/14/16 visit, I was concerned for right hip pathology, xrays were negative, I sent her for a right hip injection performed 11/21/16.     I also obtained an MRI lumbar  spine to look for cause of right quadriceps weakness and an MRI thoracic spine to evaluate fracture acuity of T7 compression fracture.     However, Susan reports (and gave us a copy of her pain journal after intraarticular hip injection which corroborates) that her low back and right leg pain were nearly gone after her right hip injection. The effect was immediate (she walked a long distance to her car without pain after injection, whereas she had terrible pain walking into injection) and lasted about seven days.            DOMINGA from 7/19/16 was 62%  ODI11/14/16 was 56%   DOMINGA 12/6/16 is 46%                  IMAGING:   MRI thoracic spine 11/22/16 with chronic T7 compression fracture, no cord compression  MRI lumbar spine 11/22/16 with      Scoliosis xrays:  T1-T12 kyphosis 80 degrees  Pelvic incidence 43 degrees  Lumbar lordosis 48 degrees  L2 pedicle fractures with L2 retrolisthesis  L3-L5 screw haloing      LABS:        VITAMIN D, TOTAL (25-HYDROXY)   Date Value Ref Range Status   11/14/2016 26.0 (L) 30.0 - 80.0 ng/mL Final                   Review of Systems   Constitutional: Negative for activity change, appetite change, chills, fatigue, fever and unexpected weight change.   HENT: Negative for dental problem, mouth sores and trouble swallowing.   Eyes: Negative for visual disturbance.   Respiratory: Negative for shortness of breath.   Cardiovascular: Negative for leg swelling.   Gastrointestinal: Negative for abdominal pain, constipation, diarrhea, nausea and vomiting.   Endocrine: Negative for cold intolerance.   Genitourinary: Negative for difficulty urinating, dysuria, enuresis, frequency and urgency.   Musculoskeletal: Positive for back pain and myalgias. Negative for gait problem, neck pain and neck stiffness.   Skin: Negative for color change.   Allergic/Immunologic: Negative for immunocompromised state.   Neurological: Negative for tremors, speech difficulty, weakness, numbness and headaches.    Hematological: Does not bruise/bleed easily.   Psychiatric/Behavioral: The patient is not nervous/anxious.              Past Medical History   Diagnosis Date     Hyperlipidemia        Hypertension      Past surgical history- L3-L5 instrumented fusion by Dr. Dumont; right femoral fracture with ORIF      Social History                        History                 Social History     Marital status:          Spouse name: N/A     Number of children: N/A     Years of education: N/A           Occupational History     Not on file.              Social History Main Topics     Smoking status: Former Smoker     Smokeless tobacco: Not on file     Alcohol use: Not on file     Drug use: Not on file     Sexual activity: Not on file              Other Topics Concern     Not on file   Social History Narrative        Family History- no history of scoliosis or spine disorders      Objective:   Physical Exam   Constitutional: She is oriented to person, place, and time. She appears well-developed and well-nourished. She is cooperative. No distress.   HENT:   Head: Normocephalic and atraumatic.   Eyes: Conjunctivae are normal.   Neck: Normal range of motion. Neck supple. No spinous process tenderness and no muscular tenderness present. No tracheal deviation present.   Musculoskeletal:   Cervical flexion/extension ROM: normal  Lumbar flexion/extension ROM: pain with flexion-extension   Neurological: She is alert and oriented to person, place, and time. No cranial nerve deficit or sensory deficit. She displays a negative Romberg sign. Gait normal. She displays no Babinski's sign on the right side. She displays no Babinski's sign on the left side.   Reflex Scores:  Patellar reflexes are 2+ on the right side and 2+ on the left side.  Achilles reflexes are 2+ on the right side and 2+ on the left side.  Strength:    LEFT RIGHT  Deltoid 5 5  Bicep 5 5  Wrist Extensor 5 5   Tricep 5 5  Finger Flexion 5 5  Finger Abduction 5  5   5 5    Hip Flexion 5 4 (pain)  Knee Extension 5 4 (pain)  Dorsiflexion 5 5  Extensor Hallucis Longus 5 5  Plantar Flexion 5 5    SI joint exam:  Negative FFT bilaterally (points to midline low back)  Negative PSIS TTP  Negative pelvic gapping and compression  Positive Gaenslen's bilaterally  Positive SURYA on right  postive thigh thrust on right      Right femoral head rotation medially and laterally while seated reproduces about 75% of her low back pain; severe right low back pain with medial femoral head rotation and axial loading while supine.     Skin: Skin is warm, dry and intact.   Psychiatric: She has a normal mood and affect. Her speech is normal and behavior is normal.

## 2021-06-12 NOTE — PROGRESS NOTES
[facility]  ENDOCRINOLOGY    Osteoporosis Follow Up 2017    Susan Patrick, 1945, [id number]          Reason for visit      1. Osteoporosis        History     Susan Patrick is a very pleasant 71 y.o. old female who presents for follow up.   SUMMARY:  Diagnosed with osteoporosis In .   DXA scan is noted below indicating a lowest T-score of -2.7 at the left femoral neck. Started on reclast after this DXA scan- no prior DXA scan at this facility is 1 from [employer] noted below..   Current and prior treatments include Reclast x2 doses  Risk factors include age, gender, , early menopause, former smoker  The following high- risk conditions have been ruled out: celiac disease, eating disorders, gastric bypass, hyperparathyroidism, inflammatory bowel disease, hyperthyroidism, rheumatoid arthritis, lupus, chronic kidney disease She is not on high risk medications such as glucocorticoids, anti-coagulants, anti-convulsants, chemotherapy or levothyroxine. Personal or family history of kidney stones is negative.  Family history of osteoporosis is Unknown- mother  young-47 Menopause was at age: 33 years. Hysterectomy at age 30 but no ovariectomy.No HRT. Height loss of 4 inches  Calcium intake 650mg calcium supplements;16oz of milk daily as well. Occasional cheese and yogurt- 4x/weekVitamin D intake 4000 IU D3 ( 2x 2000IU daily) Weight bearing exercise Unable  Denies tobacco use- former smoker quit in . Denies alcohol use   There is a stress fracture across the L2 pedicles and she is here to determine next steps in treatment.Back surgery was in .  In 2014 she saw Dr. Denise at [facility] and was advised to start on treatment but did not do so immediately. Prior to that she saw another physician Dr. Stout who recommended Fosamax in  as osteoporosis had already been diagnosed. She therefore went untreated for at least 2 years. She does not give a particular reason for not taking  medications. They lumbar fracture appears to have been atraumatic. She's not aware of any family history of osteoporosis.  She has received at least one cortisone injection through Hospital in her back. She also has right knee arthritis which has been giving her limitations in mobility.  Prior fractures in 2013 include her falling off a step and fracturing her wrist in her right hip.    TODAY:  Susan returns today in f/u for Osteoporosis treatment.  She has been taking Forteo daily for over a year now. She is also taking Alendronate weekly.  She is here with her sunglasses on today because she has become quite light sensitive after her cataract surgery.  She reports to me that her Orthopod wants to take the samuel out of her leg and to replace her R hip.  There is some question of whether her bone health could sustain this.  Being on the Forteo, I would imagine that it would help her heal more quickly. She reports no problems with the Forteo.  She continues to take Calcium and Vit D in supplementation. Her levels are 9.3 and 63, respectively. Her recent Dexa Scan shows that she has had some improvement and some decline.    Risk Factors     The following high- risk conditions have been ruled out: celiac disease, eating disorders, gastric bypass, hyperparathyroidism, inflammatory bowel disease, hyperthyroidism, rheumatoid arthritis, lupus, chronic kidney disease.      Susan Patrick has the following risk factors: Age, Female gender,  and Family history of osteoporosis      She is not on high risk medications such as glucocorticoids, anti-coagulants, anti-convulsants, chemotherapy or levothyroxine.    Patient deniesOophrectomy, Breast cancer and Family history of breast cancer.   Menopause was at age: 33 years.     Past Medical History     Patient Active Problem List   Diagnosis     Pseudoarthrosis of lumbar spine     Low back pain     L2 vertebral fracture, with delayed healing, subsequent encounter      "Osteoporosis     Vitamin D deficiency     Scheuermann's kyphosis     Non-traumatic compression fracture of T7 thoracic vertebra     Cigarette nicotine dependence in remission     Acute pain of right hip     Kyphosis of thoracic region     Parathyroid adenoma     Hypertension, goal below 140/80     Peripheral vascular disease of extremity with claudication     CVA (cerebral vascular accident)     Barahona's esophagus     Major depression     CKD (chronic kidney disease)     Arthropathy of right hip       Family History       family history includes Cerebral aneurysm in her sister.    Social History      reports that she quit smoking about 11 years ago. She has a 7.50 pack-year smoking history. She has never used smokeless tobacco. She reports that she drinks alcohol. She reports that she does not use illicit drugs.      Review of Systems     Patient denies current pain, limited mobility, fractures.   Remainder per HPI.      Vital Signs     /68 (Patient Site: Right Arm, Patient Position: Sitting, Cuff Size: Adult Regular)  Pulse 80  Ht 5' 2\" (1.575 m)  Wt 145 lb 9.6 oz (66 kg)  BMI 26.63 kg/m2    Physical Exam     GENERAL:  Normal, NIRD  EYES:  Pupils equal, round and reactive to light; no proptosis, lid lag or  periorbital edema.  THYROID:  Thyroid is normal.  No tenderness or bruit  NECK: No lymph nodes  MUSCULOSKELETAL: No joint abnormalities, FROM in all four extremities. No kyphosis. Muscle strength grossly normal without evidence of wasting.  HEART:  Regular rate and rhythm without murmur.  LUNGS:  Clear to auscultation.  ABDOMEN:Soft, non-tender, no masses or organomegaly  NEURO:  Patella Reflexes were normal.No tremors  SKIN:  No acanthosis nigricans or vitiligo        Assessment     1. Osteoporosis        Plan     Susan will continue on the Forteo until she has completed treatment.  I am unsure if she should have the surgery at this time, but I am sure that the benefits will have to outweigh the " risks for them to attempt it.  I will see her back in February.       Total visit minutes:25  Time spent counseling and coordination of care:23    Lena L Idalia   Endocrinology  8/9/2017  2:59 PM      Current Medications     Outpatient Medications Prior to Visit   Medication Sig Dispense Refill     acetaminophen (TYLENOL) 500 MG tablet Take 1,000 mg by mouth every 6 (six) hours as needed for pain.       alum/mag hydrox-simethicone-diphenhydramine-lidocaine (MAGIC MOUTHWASH) suspension Swish and spit 15 mL 4 (four) times a day as needed.       amLODIPine (NORVASC) 10 MG tablet Take 10 mg by mouth daily.       aspirin 81 MG EC tablet Take 81 mg by mouth daily.       calcium carbonate-vitamin D3 600 mg(1,500mg) -800 unit per tablet Take 1 tablet by mouth daily.       cholecalciferol, vitamin D3, 1,000 unit tablet Take 3,000 Units by mouth daily.       clopidogrel (PLAVIX) 75 mg tablet Take 75 mg by mouth daily.       ergocalciferol (ERGOCALCIFEROL) 50,000 unit capsule Take 1 capsule (50,000 Units total) by mouth once a week. 4 capsule 12     esomeprazole magnesium (NEXIUM 24 HR) 22.3 mg capsule Take 22.3 mg by mouth every morning before breakfast.       ferrous sulfate 325 (65 FE) MG tablet Take 1 tablet by mouth daily with lunch.        mirtazapine (REMERON) 30 MG tablet Take 30 mg by mouth bedtime.       MULTIVITAMIN ORAL Take by mouth.       pravastatin (PRAVACHOL) 40 MG tablet Take 40 mg by mouth bedtime.       sertraline (ZOLOFT) 100 MG tablet Take 150 mg by mouth daily.        teriparatide (FORTEO) 20 mcg/dose - 600 mcg/2.4 mL injection Inject 0.08 mL (20 mcg total) under the skin daily. 2.4 mL 6     alendronate (FOSAMAX) 70 MG tablet Take 1 tablet (70 mg total) by mouth every 7 days. Take in the morning on an empty stomach with a full glass of water 30 minutes before food 12 tablet 2     No facility-administered medications prior to visit.          Lab Results     PTH   Date Value Ref Range Status    12/30/2016 23 10 - 86 pg/mL Final     Calcium   Date Value Ref Range Status   07/26/2017 9.5 8.5 - 10.5 mg/dL Final     Phosphorus   Date Value Ref Range Status   04/27/2016 2.9 2.5 - 4.5 mg/dL Final           Imaging Results   Last DEXA scan:  Results for orders placed in visit on 06/27/17   DXA Bone Density Scan    Narrative 6/27/2017      RE: Susan Patrick  YOB: 1945        Dear Lena Friedman,    Patient Profile:  71 y.o. female, postmenopausal, is here for the follow up bone density   test.   History of fractures - Yes;  Wrist and Hip. Family history of osteoporosis   - None.  Family history of hip fracture: None. Smoking history - Past.   Osteoporosis treatment past -  No. Osteoporosis treatment current - Yes;    Forteo.  Chronic medical problems - Height loss, Hip replacement ,   Hyperparathyroidism, Hysterectomy and Spine surgery. High risk medications   -  Blood thinner (Coumadin or Heparin);  Yes, Currently.    Assessment:    1. The spine bone density assessment is limited to L1 and L2 with T-score   of -0.3.  2. Femoral bone densities show osteoporosis with a left femoral neck T-   score of -2.7 and a left total hip T score of -2.6.  Bone density cannot   be assessed on the right due to previous right hip fracture  3.  Bone density was checked in the wrist as an alternate site since the   right hip could not be assessed and since the spine measurement is limited   by postsurgical change at L3 and L4.  In the left 33% radius, low bone   mass is seen with T score -1.4.  4.  Since the scan in 2016, mixed results on bone density are noted with   an increase of 11.5% at L1 and L2 with a decrease of 4.2% in the left   total hip.    71 y.o. female with OSTEOPOROSIS and HIGH predicted fracture risk     Recommendations:  Completing 2 years of Forteo would be advised with transition to an   antiresorptive agent at that time.  A recheck of bone density in 1-2 years   is recommended      Bone  densitometry was performed on your patient using our [employer]   densitometer. The results are summarized and a copy of the actual scans   are included for your review. In conformity with the International Society   of Clinical Densitometry's most recent position statement for DXA   interpretation (2015), the diagnosis will be made on the lowest measured   T-score of the lumbar spine, femoral neck, total proximal femur or 33%   radius. Note the change in terminology for diagnostic classification from   OSTEOPENIA to LOW BONE MASS. All trending for sequential exams will be   done using multiple vertebrae or the total proximal femur. Fracture risk   is based on the WHO Fracture Risk Assessment Tool (FRAX). If additional   information is needed or if you would like to discuss the results, please   do not hesitate to call me.       Thank you for referring this patient to [facility].   We are happy to be of service in support of you and your practice. If you   have any questions or suggestions to improve our service, please call me   at [phone].     Sincerely,     Willis Connors M.D. CAugustoCYVETTE.  Osteoporosis Services, [facility]

## 2021-06-13 NOTE — PROGRESS NOTES
Code Status:  FULL CODE  Visit Type: Problem Visit     [facility][id number][facility]         Facility Type: SNF (Skilled Nursing Facility, TCU)    History of Present Illness: Susan Patrick is a 71 y.o. female when seen today for follow-up on the TCU.  Patient recently admitted status post right total hip replacement after removal of trochanteric nail on 10/6/2017.  Postoperatively patient had increased nausea,fatigue, headache and weakness. This has resolved.  She also had postoperative anemia as well as chronic iron deficiency anemia.  She continues on iron supplement.  Hemoglobin  7.8.  She had increased pain in the right lower extremity of late last week.  X-rays obtained which was negative.  Doppler also obtained negative for DVT.  Today I find her up ambulating in her room with wall walker.  Gait very steady.  Pain managed with tramadol.    Active Ambulatory Problems     Diagnosis Date Noted     Pseudoarthrosis of lumbar spine 04/05/2016     Low back pain 04/05/2016     Fracture of lumbar vertebra 04/05/2016     Osteoporosis 04/05/2016     Vitamin D deficiency 04/26/2016     Scheuermann's kyphosis 07/19/2016     Non-traumatic compression fracture of T7 thoracic vertebra 11/14/2016     Cigarette nicotine dependence in remission 11/14/2016     Acute pain of right hip 11/14/2016     Kyphosis of thoracic region 11/14/2016     Parathyroid adenoma 12/06/2016     Hypertension, goal below 140/80 12/30/2016     PVD (peripheral vascular disease) 12/30/2016     CVA (cerebral vascular accident) 12/30/2016     Barahona esophagus 12/30/2016     Major depression, recurrent 12/30/2016     CKD (chronic kidney disease) stage 3, GFR 30-59 ml/min 12/30/2016     Arthropathy of right hip 06/13/2017     Status post total hip replacement, right 10/06/2017     Cerebral infarction 03/07/2012     Depression with anxiety 10/18/2011     Iron deficiency anemia 02/22/2016     Lactose intolerance 07/23/2013     Pain  medication agreement 03/13/2015     History of colonic polyps 02/09/2012     S/P lumbar spinal fusion 12/05/2012     Resolved Ambulatory Problems     Diagnosis Date Noted     Flatback syndrome of lumbar region 04/05/2016     Past Medical History:   Diagnosis Date     Anemia      Anxiety      Arthritis      Barahona's esophagus      Cerebral aneurysm      Chronic kidney disease      Colon polyps      Coronary artery disease      Depression      GERD (gastroesophageal reflux disease)      History of blood clots      History of transfusion      Hyperlipidemia      Hypertension      Lactose intolerance      Lumbar vertebral fracture      Osteoporosis      Parathyroid adenoma      PVD (peripheral vascular disease)      Right hip pain      Stroke      Vitamin D deficiency      Xerostomia        Current Outpatient Prescriptions   Medication Sig Note     acetaminophen (TYLENOL) 500 MG tablet Take 1,000 mg by mouth every 6 (six) hours as needed for pain.      alum/mag hydrox-simethicone-diphenhydramine-lidocaine (MAGIC MOUTHWASH) suspension Swish and spit 15 mL 4 (four) times a day as needed.      amLODIPine (NORVASC) 10 MG tablet Take 10 mg by mouth daily.      aspirin 325 MG EC tablet Take 1 tablet (325 mg total) by mouth 2 (two) times a day.      azelastine (ASTELIN) 137 mcg (0.1 %) nasal spray 1 spray into each nostril 2 (two) times a day. Use in each nostril as directed      calcium carbonate-vitamin D3 600 mg(1,500mg) -800 unit per tablet Take 1 tablet by mouth daily.      cholecalciferol, vitamin D3, 1,000 unit tablet Take 3,000 Units by mouth daily.      clopidogrel (PLAVIX) 75 mg tablet Take 75 mg by mouth daily.      esomeprazole magnesium (NEXIUM 24 HR) 22.3 mg capsule Take 22.3 mg by mouth every morning before breakfast.      ferrous sulfate 325 (65 FE) MG tablet Take 1 tablet by mouth twice a day with lunch and supper.       lisinopril (PRINIVIL,ZESTRIL) 20 MG tablet Take 20 mg by mouth daily.      mirtazapine  (REMERON) 30 MG tablet Take 30 mg by mouth bedtime.      MULTIVITAMIN ORAL Take 1 tablet by mouth daily.       pravastatin (PRAVACHOL) 40 MG tablet Take 40 mg by mouth bedtime.      sertraline (ZOLOFT) 100 MG tablet Take 150 mg by mouth daily.       teriparatide (FORTEO) 20 mcg/dose - 600 mcg/2.4 mL injection Inject 0.08 mL (20 mcg total) under the skin daily. 10/6/2017: Takes at 6 pm     traMADol (ULTRAM) 50 mg tablet Take 1 tablet (50 mg total) by mouth every 6 (six) hours as needed for pain.          Review of Systems   No fevers or chills. No headache, lightheadedness or dizziness. No SOB, chest pains or palpitations. Appetite is fair.  Pain control with tramadol.. No nausea, vomiting, constipation or diarrhea. No dysuria, frequency, burning or pain with urination.     Physical Exam   PHYSICAL EXAMINATION:  Vital signs:   Vitals:    10/17/17 2002   BP: 147/64   Pulse: 66   Resp: 16   Temp: 97.3  F (36.3  C)   SpO2: 99%     General: Awake, Alert, oriented x3, appropriately, follows simple commands, conversant  HEENT:PERRLA, Pink conjunctiva, anicteric sclerae, moist oral mucosa  NECK: Supple, without any lymphadenopathy, or masses  CVS:  S1  S2, without murmur or gallop.   LUNG: Clear to auscultation, No wheezes, rales or rhonci.  BACK: No kyphosis of the thoracic spine  ABDOMEN: Soft, nontender to palpation, with positive bowel sounds  EXTREMITIES: Moves both upper and lower extremities, 1+ pedal edema, no calf tenderness.  Ambulates independently with a rolling walker.  Gait steady.  Incision to right hip with Mepilex dressing with slight strikethrough.  SKIN: Warm and dry, no rashes or erythema noted  NEUROLOGIC: Intact, pulses palpable  PSYCHIATRIC: Cognition intact          Labs:    Lab Results   Component Value Date    WBC 9.2 10/10/2017    HGB 7.7 (L) 10/14/2017    HCT 22.6 (L) 10/10/2017    MCV 92 10/10/2017     10/10/2017     Results for orders placed or performed during the hospital encounter of  10/06/17   Basic Metabolic Panel   Result Value Ref Range    Sodium 141 136 - 145 mmol/L    Potassium 4.0 3.5 - 5.0 mmol/L    Chloride 111 (H) 98 - 107 mmol/L    CO2 24 22 - 31 mmol/L    Anion Gap, Calculation 6 5 - 18 mmol/L    Glucose 94 70 - 125 mg/dL    Calcium 8.8 8.5 - 10.5 mg/dL    BUN 26 8 - 28 mg/dL    Creatinine 0.87 0.60 - 1.10 mg/dL    GFR MDRD Af Amer >60 >60 mL/min/1.73m2    GFR MDRD Non Af Amer >60 >60 mL/min/1.73m2           Assessment/Plan:  1. History of total right hip arthroplasty     2. Chronic kidney disease, stage III (moderate)     3. Hypertension     4. Major depression     5. Pain management     6. Anemia       Recent right total hip arthroplasty post removal of nail.  Patient continues in therapy.  Ambulating independently with a rolling walker.  Increased pain of late last week.  X-ray negative.  Doppler for DVT negative.  Slight edema in the lower extremity.  Patient continues on tramadol for pain.  Hypertension.  Blood pressure satisfactory control.  Postoperative anemia.  As well as chronic iron deficiency and ongoing.  Hemoglobin 7.8 She does continue on iron supplements.  Will recheck hemoglobin late in a week.        35  minutes spent of which greater than 50% was face to face communication with the patient about above plan of care    Electronically signed by: Mayi Clifford CNP

## 2021-06-13 NOTE — PROGRESS NOTES
[facility]      Facility:    [facility] [[id number]]  Code Status: UNKNOWN      Chief Complaint/Reason for Visit:  Chief Complaint   Patient presents with     H & P     Status post removal of trochanteric nail with conversion to right total hip arthroplasty.  Pain management, chronic low back pain, vitamin D deficiency, chronic kidney disease stage III, depression with anxiety, iron deficiency anemia.       HPI:   Susan is a 71 y.o. female who was recently admitted to the hospital on 10/6/2017 she was admitted for removal of a trochanteric nail and conversion into her right total hip arthroplasty.  She was admitted to the hospital and underwent the procedure on 10/6/2017 and there were no perioperative complications.  In the hospital she did have postoperative issues with nausea and fatigue and discharge was delayed on postoperative day 3 due to headache and weakness.  She did have postoperative anemia that did stabilize however the last hemoglobin reading that I have is 7.5 the chart.  During her hospital course she stayed hemodynamically stable and was treated appropriately and transferred here to the TCU at [facility] in stable condition.    Patient complains today of pain in her right hip.  She was currently pain-free but yesterday she did fill feel a pain of the lateral aspect of her involved leg up into her hip and into her groin.  She is moving his bowels okay and does not believe her pain is well managed with this new onset of pain.  Patient otherwise says she is feeling okay and she has progressed with physical and Occupational Therapy.  I did review her hemoglobin from the hospital was 7.5 on last check and we will check that today.    Past Medical History:  Past Medical History:   Diagnosis Date     Anemia     Iron deficiency     Anxiety      Arthritis      Barahona's esophagus      Cerebral aneurysm     right leg weakness residual     Chronic kidney  disease     CKD stage 3     Colon polyps     past hx.     Coronary artery disease      Depression      GERD (gastroesophageal reflux disease)      History of blood clots      History of transfusion      Hyperlipidemia      Hypertension      Lactose intolerance      Lumbar vertebral fracture      Osteoporosis      Parathyroid adenoma      PVD (peripheral vascular disease)      Right hip pain      Stroke     cerebral infarction-aneurysm     Vitamin D deficiency      Xerostomia     dryness in mouth           Surgical History:  Past Surgical History:   Procedure Laterality Date     APPENDECTOMY       BACK SURGERY       CEREBRAL ANEURYSM REPAIR  2006    clipped     CEREBRAL ANEURYSM REPAIR       CERVICAL LAMINECTOMY      C6-7     CHOLECYSTECTOMY       EYE SURGERY Bilateral     cat     FEMORAL BYPASS  1995    left     FRACTURE SURGERY Right     hip pinning     HEMORRHOID SURGERY       HYSTERECTOMY      BSO     LUMBAR FUSION      L3-4, L4-5     PARATHYROIDECTOMY N/A 12/30/2016    Procedure: NECK EXPLORATION FOR PARATHYROID ;  Surgeon: Gilberto Snowden MD;  Location: [facility];  Service:      REVISION TOTAL HIP ARTHROPLASTY Right 10/6/2017    Procedure: REMOVAL OF TROCHANTERIC NAIL CONVERSION TO RIGHT TOTAL HIP ARTRHOPLASTY;  Surgeon: Seb Ortiz DO;  Location: [facility] OR;  Service:      right wrist closed reduction         Family History:   Family History   Problem Relation Age of Onset     Cerebral aneurysm Sister        Social History:    Social History     Social History     Marital status:      Spouse name: N/A     Number of children: N/A     Years of education: N/A     Social History Main Topics     Smoking status: Former Smoker     Packs/day: 0.50     Years: 15.00     Quit date: 4/27/2006     Smokeless tobacco: Never Used     Alcohol use Yes      Comment: rare     Drug use: No     Sexual activity: Not Asked     Other Topics Concern     None     Social History Narrative          Review of  Systems   Constitutional:        Review of systems positive for right hip pain.  She does have a hydrocolloid dressing on at this time and she claims her pain started yesterday there was no pop or injury.  She denies any fevers chills nausea vomiting diarrhea change in vision hearing taste or smell weakness one side the other chest pain or shortness of breath.  She is urinating okay and moving her bowels without difficulty and appetite is good.  The remainder the review of systems is negative.       Vitals:    10/13/17 0635   BP: 141/60   Pulse: 62   Resp: 20   Temp: 98.1  F (36.7  C)   SpO2: 97%       Physical Exam   Constitutional: No distress.   HENT:   Head: Normocephalic and atraumatic.   Mouth/Throat: No oropharyngeal exudate.   Eyes: Right eye exhibits no discharge. Left eye exhibits no discharge. No scleral icterus.   Neck: Neck supple. No JVD present. No thyromegaly present.   Cardiovascular: Normal rate and regular rhythm.    Pulmonary/Chest: Effort normal and breath sounds normal. No respiratory distress. She has no wheezes.   Abdominal: Soft. Bowel sounds are normal. She exhibits no distension. There is no tenderness.   Musculoskeletal: She exhibits no edema.   Right hip is tender over the trochanteric area.  I did not do range of motion at this time secondary to this tenderness.   Neurological: She is alert. No cranial nerve deficit. She exhibits normal muscle tone.   Skin: Skin is warm and dry. She is not diaphoretic.   Psychiatric: She has a normal mood and affect. Her behavior is normal.       Medication List:  Current Outpatient Prescriptions   Medication Sig     acetaminophen (TYLENOL) 500 MG tablet Take 1,000 mg by mouth every 6 (six) hours as needed for pain.     alum/mag hydrox-simethicone-diphenhydramine-lidocaine (MAGIC MOUTHWASH) suspension Swish and spit 15 mL 4 (four) times a day as needed.     amLODIPine (NORVASC) 10 MG tablet Take 10 mg by mouth daily.     aspirin 325 MG EC tablet Take 1  tablet (325 mg total) by mouth 2 (two) times a day.     azelastine (ASTELIN) 137 mcg (0.1 %) nasal spray 1 spray into each nostril 2 (two) times a day. Use in each nostril as directed     calcium carbonate-vitamin D3 600 mg(1,500mg) -800 unit per tablet Take 1 tablet by mouth daily.     cholecalciferol, vitamin D3, 1,000 unit tablet Take 3,000 Units by mouth daily.     clopidogrel (PLAVIX) 75 mg tablet Take 75 mg by mouth daily.     docusate sodium (COLACE) 100 MG capsule Take 1 capsule (100 mg total) by mouth 2 (two) times a day as needed.     esomeprazole magnesium (NEXIUM 24 HR) 22.3 mg capsule Take 22.3 mg by mouth every morning before breakfast.     ferrous sulfate 325 (65 FE) MG tablet Take 1 tablet by mouth twice a day with lunch and supper.      lisinopril (PRINIVIL,ZESTRIL) 20 MG tablet Take 20 mg by mouth daily.     mirtazapine (REMERON) 30 MG tablet Take 30 mg by mouth bedtime.     MULTIVITAMIN ORAL Take 1 tablet by mouth daily.      pravastatin (PRAVACHOL) 40 MG tablet Take 40 mg by mouth bedtime.     sertraline (ZOLOFT) 100 MG tablet Take 150 mg by mouth daily.      teriparatide (FORTEO) 20 mcg/dose - 600 mcg/2.4 mL injection Inject 0.08 mL (20 mcg total) under the skin daily.     traMADol (ULTRAM) 50 mg tablet Take 1 tablet (50 mg total) by mouth every 6 (six) hours as needed for pain.       Labs: Hemoglobin in the hospital started at 8.1 on 10/8/2017 and on 10/10/2017 was 7.5.  Platelets were normal at 189,000.      Assessment:    ICD-10-CM    1. History of total right hip arthroplasty Z96.641    2. Pain management R52    3. Major depression F32.9    4. Chronic kidney disease, stage III (moderate) N18.3    5. Hypertension I10        Plan: Plan at this time is to increase tramadol to 75 mg every 6 hours as needed for pain greater than 4 out of 10.  Pain assessments will be done every 6 hours and will check a hemoglobin today secondary to postoperative anemia.  I will hold off on physical therapy this  morning until the x-ray comes back and I can make my final decision.  No other changes to care plan at this time.        Electronically signed by: Corbin Brown,

## 2021-06-13 NOTE — ANESTHESIA PROCEDURE NOTES
Spinal Block    Patient location during procedure: OR  Start time: 10/6/2017 11:54 AM  End time: 10/6/2017 11:56 AM  Reason for block: at surgeon's request and primary anesthetic    Staffing:  Performing  Anesthesiologist: SARAY PAULA    Preanesthetic Checklist  Completed: patient identified, risks, benefits, and alternatives discussed, timeout performed, consent obtained, at patient's request, airway assessed, oxygen available, suction available, emergency drugs available and hand hygiene performed  Spinal Block  Patient position: sitting  Prep: ChloraPrep  Patient monitoring: heart rate, cardiac monitor, continuous pulse ox and blood pressure  Approach: midline  Location: L4-5  Injection technique: single-shot  Needle type: pencil-tip   Needle gauge: 24 G    Assessment  Sensory level: T6

## 2021-06-13 NOTE — ANESTHESIA PREPROCEDURE EVALUATION
Anesthesia Evaluation      Patient summary reviewed     Airway   Mallampati: II  Neck ROM: full   Pulmonary - negative ROS and normal exam    breath sounds clear to auscultation  (+) a smoker                         Cardiovascular - normal exam  (+) hypertension, CAD (Listed in PMH. Denies angina, hx MI/CABG/stent), , PVD (s/p femoral bypass. Has been off Plavix for 7 days.)    ECG reviewed  Rhythm: regular  Rate: normal,         Neuro/Psych    (+) CVA (Denies residual symptoms after going through therapy (previously had speech difficulty and right leg weakness)) , depression,     Comments: L 3-5 laminectomy & fusion. Hardware in place. Hx L2 vertebral fracture. Lumbar X-ray shows potential areas accessible to spinal anesthetic around L4-5.    Endo/Other - negative ROS      GI/Hepatic/Renal    (+) GERD well controlled,   chronic renal disease,           Dental    (+) upper dentures                       Anesthesia Plan  Planned anesthetic: spinal  Discussed possible difficulty of spinal placement given her previous lumbar surgery. Patient agrees to proceed with spinal and understands that she may need GETA.  ASA 3     Anesthetic plan and risks discussed with: patient    Post-op plan: routine recovery

## 2021-06-13 NOTE — ANESTHESIA CARE TRANSFER NOTE
Last vitals:   Vitals:    10/06/17 1434   BP: 156/70   Pulse: (!) 54   Resp: 16   Temp: 36.5  C (97.7  F)   SpO2: 99%     Patient's level of consciousness is drowsy  Spontaneous respirations: yes  Maintains airway independently: yes  Dentition unchanged: yes  Oropharynx: oropharynx clear of all foreign objects    QCDR Measures:  ASA# 20 - Surgical Safety Checklist: WHO surgical safety checklist completed prior to induction  PQRS# 430 - Adult PONV Prevention: 4558F - Pt received => 2 anti-emetic agents (different classes) preop & intraop  ASA# 8 - Peds PONV Prevention: NA - Not pediatric patient, not GA or 2 or more risk factors NOT present  PQRS# 424 - Veronica-op Temp Management: 4559F - At least one body temp DOCUMENTED => 35.5C or 95.9F within required timeframe  PQRS# 426 - PACU Transfer Protocol: - Transfer of care checklist used  ASA# 14 - Acute Post-op Pain: ASA14B - Patient did NOT experience pain >= 7 out of 10

## 2021-06-13 NOTE — PROGRESS NOTES
Code Status:  FULL CODE  Visit Type: Discharge Summary     Facility:  [facility] [[id number]]         Facility Type: SNF (Skilled Nursing Facility, TCU)    History of Present Illness: Susan Patrick is a 71 y.o. female when seen today for discharge from the TCU.  Patient status post conversion to right total hip arthroplasty on 10/6/2017 after removal of trochanteric nail.  Postoperatively patient had increased nausea,fatigue, headache and weakness. This has resolved.  She also had postoperative anemia as well as chronic iron deficiency anemia.  She continues on iron supplement.  Hemoglobin is trending upward to 8.8 from 7.8.  She continues on iron supplementation.  She had increased pain in the right lower extremity of late last week.  X-rays obtained which was negative.  Doppler also obtained negative for DVT.  Pain improved.  She does continue on tramadol.  She is ambulating with a rolling walker independently.  Gait steady.    Active Ambulatory Problems     Diagnosis Date Noted     Pseudoarthrosis of lumbar spine 04/05/2016     Low back pain 04/05/2016     Fracture of lumbar vertebra 04/05/2016     Osteoporosis 04/05/2016     Vitamin D deficiency 04/26/2016     Scheuermann's kyphosis 07/19/2016     Non-traumatic compression fracture of T7 thoracic vertebra 11/14/2016     Cigarette nicotine dependence in remission 11/14/2016     Acute pain of right hip 11/14/2016     Kyphosis of thoracic region 11/14/2016     Parathyroid adenoma 12/06/2016     Hypertension, goal below 140/80 12/30/2016     PVD (peripheral vascular disease) 12/30/2016     CVA (cerebral vascular accident) 12/30/2016     Barahona esophagus 12/30/2016     Major depression, recurrent 12/30/2016     CKD (chronic kidney disease) stage 3, GFR 30-59 ml/min 12/30/2016     Arthropathy of right hip 06/13/2017     Status post total hip replacement, right 10/06/2017     Cerebral infarction 03/07/2012     Depression with anxiety 10/18/2011      Iron deficiency anemia 02/22/2016     Lactose intolerance 07/23/2013     Pain medication agreement 03/13/2015     History of colonic polyps 02/09/2012     S/P lumbar spinal fusion 12/05/2012     Resolved Ambulatory Problems     Diagnosis Date Noted     Flatback syndrome of lumbar region 04/05/2016     Past Medical History:   Diagnosis Date     Anemia      Anxiety      Arthritis      Barahona's esophagus      Cerebral aneurysm      Chronic kidney disease      Colon polyps      Coronary artery disease      Depression      GERD (gastroesophageal reflux disease)      History of blood clots      History of transfusion      Hyperlipidemia      Hypertension      Lactose intolerance      Lumbar vertebral fracture      Osteoporosis      Parathyroid adenoma      PVD (peripheral vascular disease)      Right hip pain      Stroke      Vitamin D deficiency      Xerostomia        Current Outpatient Prescriptions   Medication Sig Note     acetaminophen (TYLENOL) 500 MG tablet Take 1,000 mg by mouth every 6 (six) hours as needed for pain.      alum/mag hydrox-simethicone-diphenhydramine-lidocaine (MAGIC MOUTHWASH) suspension Swish and spit 15 mL 4 (four) times a day as needed.      amLODIPine (NORVASC) 10 MG tablet Take 10 mg by mouth daily.      aspirin 325 MG EC tablet Take 1 tablet (325 mg total) by mouth 2 (two) times a day.      azelastine (ASTELIN) 137 mcg (0.1 %) nasal spray 1 spray into each nostril 2 (two) times a day. Use in each nostril as directed      calcium carbonate-vitamin D3 600 mg(1,500mg) -800 unit per tablet Take 1 tablet by mouth daily.      cholecalciferol, vitamin D3, 1,000 unit tablet Take 3,000 Units by mouth daily.      clopidogrel (PLAVIX) 75 mg tablet Take 75 mg by mouth daily.      esomeprazole magnesium (NEXIUM 24 HR) 22.3 mg capsule Take 22.3 mg by mouth every morning before breakfast.      ferrous sulfate 325 (65 FE) MG tablet Take 1 tablet by mouth twice a day with lunch and supper.       lisinopril  (PRINIVIL,ZESTRIL) 20 MG tablet Take 20 mg by mouth daily.      mirtazapine (REMERON) 30 MG tablet Take 30 mg by mouth bedtime.      MULTIVITAMIN ORAL Take 1 tablet by mouth daily.       pravastatin (PRAVACHOL) 40 MG tablet Take 40 mg by mouth bedtime.      sertraline (ZOLOFT) 100 MG tablet Take 150 mg by mouth daily.       teriparatide (FORTEO) 20 mcg/dose - 600 mcg/2.4 mL injection Inject 0.08 mL (20 mcg total) under the skin daily. 10/6/2017: Takes at 6 pm     traMADol (ULTRAM) 50 mg tablet Take 1 tablet (50 mg total) by mouth every 6 (six) hours as needed for pain.          Review of Systems   No fevers or chills. No headache, lightheadedness or dizziness. No SOB, chest pains or palpitations. Appetite is fair.  Pain controlled with tramadol.. No nausea, vomiting, constipation or diarrhea. No dysuria, frequency, burning or pain with urination.     Physical Exam   PHYSICAL EXAMINATION:  Vital signs:   Vitals:    10/19/17 1910   BP: 166/81   Pulse: 67   Resp: 16   Temp: 96.8  F (36  C)   SpO2: 96%     General: Awake, Alert, oriented x3, appropriately, follows simple commands, conversant  HEENT:PERRLA, Pink conjunctiva, anicteric sclerae, moist oral mucosa  NECK: Supple, without any lymphadenopathy, or masses  CVS:  S1  S2, without murmur or gallop.   LUNG: Clear to auscultation, No wheezes, rales or rhonci.  BACK: No kyphosis of the thoracic spine  ABDOMEN: Soft, nontender to palpation, with positive bowel sounds  EXTREMITIES: Moves both upper and lower extremities, 1+ pedal edema, no calf tenderness.  Ambulates independently with a rolling walker.  Gait steady.  Incision to right hip with Mepilex dressing with slight strikethrough.  SKIN: Warm and dry, no rashes or erythema noted  NEUROLOGIC: Intact, pulses palpable  PSYCHIATRIC: Cognition intact.  Flat affect.          Labs:    Lab Results   Component Value Date    WBC 9.2 10/10/2017    HGB 8.8 (L) 10/19/2017    HCT 22.6 (L) 10/10/2017    MCV 92 10/10/2017    PLT  189 10/10/2017     Results for orders placed or performed during the hospital encounter of 10/06/17   Basic Metabolic Panel   Result Value Ref Range    Sodium 141 136 - 145 mmol/L    Potassium 4.0 3.5 - 5.0 mmol/L    Chloride 111 (H) 98 - 107 mmol/L    CO2 24 22 - 31 mmol/L    Anion Gap, Calculation 6 5 - 18 mmol/L    Glucose 94 70 - 125 mg/dL    Calcium 8.8 8.5 - 10.5 mg/dL    BUN 26 8 - 28 mg/dL    Creatinine 0.87 0.60 - 1.10 mg/dL    GFR MDRD Af Amer >60 >60 mL/min/1.73m2    GFR MDRD Non Af Amer >60 >60 mL/min/1.73m2           Assessment/Plan:  1. History of total right hip arthroplasty     2. Chronic kidney disease, stage III (moderate)     3. Anemia     4. Hypertension     5. Major depression     6. Pain management       Patient status post total right hip arthroplasty after removal of nail.  She continues on tramadol for pain.  She is ambulating well steady gait.  Anemia both acute blood loss and history of iron deficiency.  Hemoglobin trending upward.  Now 8.8 from 7.8.  She does continue on iron supplement.  She will need a recheck of her hemoglobin with her primary care provider.  Postoperative nausea.  Somewhat improved.  Hypertension.  Suspect controlled.  Major depression on multiple SSRIs.  Continues on aspirin twice daily for DVT prophylaxis 6.     Patient will discharge home with current medications including tramadol.  Patient will do outpatient physical therapy through [facility].  She has a follow-up with Elise VERDUGO on Wednesday.    35  minutes spent of which greater than 50% was face to face communication with the patient about above plan of care    Electronically signed by: Mayi Clifford, MOHSEN

## 2021-06-13 NOTE — ANESTHESIA POSTPROCEDURE EVALUATION
Patient: Susan Patrick  REMOVAL OF TROCHANTERIC NAIL CONVERSION TO RIGHT TOTAL HIP ARTRHOPLASTY  Anesthesia type: spinal    Patient location: PACU  Last vitals:   Vitals:    10/06/17 1500   BP: 141/64   Pulse: (!) 50   Resp: 15   Temp:    SpO2: 95%     Post vital signs: stable  Level of consciousness: awake and responds to simple questions  Post-anesthesia pain: pain controlled  Post-anesthesia nausea and vomiting: no  Pulmonary: on nasal oxygen  Cardiovascular: stable and blood pressure at baseline  Hydration: adequate  Anesthetic events: no    QCDR Measures:  ASA# 11 - Veronica-op Cardiac Arrest: ASA11B - Patient did NOT experience unanticipated cardiac arrest  ASA# 12 - Veronica-op Mortality Rate: ASA12B - Patient did NOT die  ASA# 13 - PACU Re-Intubation Rate: NA - No ETT / LMA used for case  ASA# 10 - Composite Anes Safety: ASA10A - No serious adverse event    Additional Notes:

## 2021-06-14 NOTE — PROGRESS NOTES
CHART NOTE     DATE OF SERVICE:  2017     : 1945   71 y.o.     ASSESSMENT :   Doing well with improvement in symptoms and function.       PLAN: Continue with all scheduled follow ups. If she needs further follow up will return as new patient for Dr. Miranda (she has reviewed hx and imaging). Otherwise patient given info for Dr. Gastelum.     HPI:  Susan Patrick is here for follow up after management per Dr. Gastelum for back pain, compression fxs.    Patient initially was referred Dr. Stephania Berry for low back pain and prior fusion by Dr. Dumont (7/3/2012).  She was found new bilateral pedicle stress fractures at L2. Also psuedarthrosis of previous posterior instrumented fusion at L3-4 and L4-5.  Placed in LSO and referred to Endocrine for eval/treatment osteoporosis. When seen at subsequent visit also noted to have a T7 thoracic compression fracture.     Referred by Dr. Berry 2016 and seen in consult by Dr. Gastelum on 2016.  Last seen in clinic by Dr. Gastelum on 2017.  Per her note: Patient eventually will need surgery for revision of her lumbar pseudoarthrosis unless the Forteo helps this to heal. Referred to Dr. Snowden for consideration of parathyroidectomy given NM parathyroid study 16 that suggested inferior posterior left parathyroid adenoma, her refractory elevated PTH and low vitamin D. Following up with [facility] for hip issues       TODAY, Susan Patrick reports minimal back pain, only with prolonged sitting or walking, relieved with changing positions. Walking overall has improved, doesn't need cane at home. Has not needed her LSO.  Still in PT but otherwise feels she is doing well.  She did indeed have a R hip arthroplasty [id number] with Dr. Angel [facility]. Also had resection of left superior parathyroid adenoma by Dr. Snowden on 2016.  Follows with Endocrinology.  Her bone density has improved since being on her Forteo.  Has upcoming apps with Endo and Ortho. No  "further follow up with Dr. Snowden.     PAST MEDICAL HISTORY, SURGICAL HISTORY, REVIEW OF SYMPTOMS, MEDICATIONS AND ALLERGIES:  Past medical history, surgical history, ROS, medications and allergies reviewed with patient and remain unchanged from previous visit.    Past Medical History:   Diagnosis Date     Anemia     Iron deficiency     Anxiety      Arthritis      Barahona's esophagus      Cerebral aneurysm     right leg weakness residual     Chronic kidney disease     CKD stage 3     Colon polyps     past hx.     Coronary artery disease      Depression      GERD (gastroesophageal reflux disease)      History of blood clots      History of transfusion      Hyperlipidemia      Hypertension      Lactose intolerance      Lumbar vertebral fracture      Osteoporosis      Parathyroid adenoma      PVD (peripheral vascular disease)      Right hip pain      Stroke     cerebral infarction-aneurysm     Vitamin D deficiency      Xerostomia     dryness in mouth       PHYSICAL EXAM:    /68  Pulse 67  Ht 5' 2\" (1.575 m)  Wt 145 lb (65.8 kg)  SpO2 96%  BMI 26.52 kg/m2      Neurological exam reveals:  Respirations easy, non-labored.   Skin: W/D/I. No rashes, lesions or breaks in integrity.   Recent and remote memory intact, fund of knowledge wnl.    Alert and oriented x3, speech fluent and appropriate.   PERRL, EOMI, No nystagmus,   Face symmetric, tongue midline, Uvula midline,  palate rises with phonation   Shoulder shrug equal  Leg strength bilateral dorsiflexion, plantar flexion, and hip flexion full  Cn heel toe walk without difficulty  No extremity edema noted.   Muscle Bulk and tone wnl.   Reflexes: No pathological reflexes   Gait and station: steady without cane    LABS:   Vitamin D, Total (25-Hydroxy)   Date Value Ref Range Status   07/26/2017 63.8 30.0 - 80.0 ng/mL Final       RADIOGRAPHIC IMAGING:    XRAYS:   AP/LAT: There is lucency about the left L5 pedicle screw that appears slightly more pronounced than on " the prior plain film suggesting some degree of loosening. No hardware fracture.     Lat F/E: Per report:  in neutral position, there is 5.5 mm anterolisthesis L4 on L5 which increases to 7 mm on flexion and is stable on extension. No other subluxation. Marked degenerative disc disease L5-S1and moderate changes at L2-L3. Vascular calcifications. Right total hip arthroplasty also noted.       Standing scoliosis: Mild broad left convexity thoracolumbar curvature not significantly changed. Mild chronic compression of T7 unchanged. Sagittal balance  similar to the prior examination. Thoracic kyphosis sl increased (67 to 72 degrees per report).  Stable lumbar lordosis.     Films personally reviewed and interpreted.  Also reviewed and discussed with Dr. Miranda.   Reviewed imaging with patient and family.

## 2021-06-15 NOTE — TELEPHONE ENCOUNTER
Pt called back and I informed of the below. She believes she is taking 4,000 international unit(s) daily so she will take 8,000 international unit(s) daily until April and then transition back to 4,000 international unit(s) or 6,000 international unit(s) daily. Pt will take more vitamin D for the winter months.

## 2021-06-15 NOTE — PROGRESS NOTES
Susan Patrick is a 75 y.o. female who is being evaluated via a billable telephone visit.      What phone number would you like to be contacted at? [phone]  How would you like to obtain your AVS? AVS Preference: Mail a copy.       Reason for visit      1. Senile osteoporosis        History     Susan Patrick is a very pleasant 75 y.o. old female who presents for follow up.   SUMMARY:  Diagnosed with osteoporosis  In .   DXA scan is noted below indicating a lowest T-score of -2.7 at the left femoral neck. Started on reclast after this DXA scan- no prior DXA scan at this facility is 1 from [employer] noted below..   Current and prior treatments include Reclast x2 doses  Risk factors include age, gender, , early menopause, former smoker  The following high- risk conditions have been ruled out: celiac disease, eating disorders, gastric bypass, hyperparathyroidism, inflammatory bowel disease, hyperthyroidism, rheumatoid arthritis, lupus, chronic kidney disease.  She is not on high risk medications such as glucocorticoids, anti-coagulants, anti-convulsants, chemotherapy or levothyroxine.   Personal or family history of kidney stones is negative.  Family history of osteoporosis is Unknown- mother  young-47     Calcium intake 650mg calcium supplements;16oz of milk daily as well. Occasional cheese and yogurt- 4x/week  Vitamin D intake 4000 IU D3 ( 2x 2000IU daily)  Weight bearing exercise Unable  Denied tobacco use- former smoker quit in .   Denies alcohol use  There is a stress fracture across the L2 pedicles and she is here to determine next steps in treatment.  Back surgery was in .  In 2014 she saw Dr. Denise at [facility] and was advised to start on treatment but did not do so immediately.  Prior to that she saw another physician Dr. Stout who recommended Fosamax in  as osteoporosis had already been diagnosed.  She therefore went untreated for at least 2 years.  She does not give a  particular reason for not taking medications.  They lumbar fracture appears to have been atraumatic.  She's not aware of any family history of osteoporosis.  She has received at least one cortisone injection through Hospital in her back.  She also has right knee arthritis which has been giving her limitations in mobility.  Prior fractures in 2013 include her falling off a step and fracturing her wrist in her right hip    TODAY:    Susan is contacted today in follow-up for Osteoporosis. She reports that she has had some trouble with walking recently because of a Baker's Cyst behind her L knee. She also needs a knee replacement on her R knee. She has had some cortisone, which didn't do a lot, she states. Now they are using the Rooster Comb, and still no relief. She is discouraged. She does continue on the Fosamax without problems. She is due for a Dexa Scan (actually last year) and will try and get that done. Her last showed improvement and stability. Her current Vit D level is 30 and Calcium level is 9.5.     Dexa Scan: 8/31/18 2/24/21  Vitamin D: 30.9  Calcium: 9.5      Risk Factors     The following high- risk conditions have been ruled out: celiac disease, eating disorders, gastric bypass, hyperparathyroidism, inflammatory bowel disease, hyperthyroidism, rheumatoid arthritis, lupus, chronic kidney disease.     Susan Patrick has the following risk factors: Age, Female gender,  and Low BMI, hx of smoking     She is not on high risk medications such as glucocorticoids, anti-coagulants, anti-convulsants, chemotherapy or levothyroxine.    Patient deniesBreast cancer and Family history of breast cancer.      Menopause was at age: 33 years.  Hysterectomy at age 30 but no ovariectomy.No HRT.  Height loss of 4 inches    Past Medical History     Patient Active Problem List   Diagnosis     Pseudoarthrosis of lumbar spine     Low back pain     Fracture of lumbar vertebra (H)     Vitamin D deficiency      Scheuermann's kyphosis     Nontraumatic compression fracture of T7 vertebra (H)     Cigarette nicotine dependence in remission     Acute pain of right hip     Kyphosis of thoracic region     Parathyroid adenoma     Hypertension, goal below 140/80     PVD (peripheral vascular disease) (H)     CVA (cerebral vascular accident) (H)     Barahona esophagus     Major depression, recurrent (H)     CKD (chronic kidney disease) stage 3, GFR 30-59 ml/min     Arthropathy of right hip     Status post total hip replacement, right     Cerebral infarction (H)     Depression with anxiety     Iron deficiency anemia     Lactose intolerance     Pain medication agreement     History of colonic polyps     S/P lumbar spinal fusion     Lumbar radiculopathy     Lumbar pseudoarthrosis     Postoperative anemia     Urinary retention with incomplete bladder emptying     Severe sepsis (H)     Acute cystitis without hematuria     Hypokalemia     LADARIUS (acute kidney injury) (H)     Bacteremia due to Escherichia coli     CAD (coronary artery disease)     Pseudophakia, both eyes     Senile osteoporosis     Ureteral stone     Xerostomia     ACP (advance care planning)     Fall at home     Primary osteoarthritis of left knee     Synovial cyst of left popliteal space       Family History       family history includes Cerebral aneurysm in her sister.    Social History      reports that she quit smoking about 15 years ago. She has a 7.50 pack-year smoking history. She has never used smokeless tobacco. She reports current alcohol use. She reports that she does not use drugs.      Review of Systems     Patient denies current pain, limited mobility, fractures.   Remainder per HPI.      Vital Signs     There were no vitals taken for this visit.    Physical Exam         Assessment     1. Senile osteoporosis        Plan     Pt instructed to double her current Vit D supplementation. She will also continue with the Fosamax and f/u with me in 1 year.            Lena L Idalia   Endocrinology  3/4/2021  7:23 AM            Current Medications     Outpatient Medications Prior to Visit   Medication Sig Dispense Refill     acetaminophen (TYLENOL) 325 MG tablet Take 650 mg by mouth every 6 (six) hours as needed for pain.       amLODIPine (NORVASC) 10 MG tablet Take 10 mg by mouth daily.       aspirin 81 mg chewable tablet Chew 81 mg daily.       calcium carbonate-vitamin D3 600 mg(1,500mg) -800 unit per tablet Take 1 tablet by mouth daily.       cholecalciferol, vitamin D3, 1,000 unit tablet Take 3,000 Units by mouth daily.       clopidogrel (PLAVIX) 75 mg tablet Take 75 mg by mouth daily.       esomeprazole magnesium (NEXIUM 24 HR) 22.3 mg capsule Take 22.3 mg by mouth every morning before breakfast.       ferrous sulfate 325 (65 FE) MG tablet Take 1 tablet by mouth twice a day with lunch and supper.        gabapentin (NEURONTIN) 100 MG capsule Take 100 mg by mouth 3 (three) times a day. Gradually taper up per your doctors instructions 90 capsule 3     lisinopriL (PRINIVIL,ZESTRIL) 40 MG tablet Take 40 mg by mouth daily.       mirtazapine (REMERON) 30 MG tablet Take 30 mg by mouth bedtime.       MULTIVITAMIN ORAL Take 1 tablet by mouth daily.        pravastatin (PRAVACHOL) 40 MG tablet Take 40 mg by mouth bedtime.       sertraline (ZOLOFT) 50 MG tablet TAKE 3 TABLETS BY MOUTH EVERY MORNING       alendronate (FOSAMAX) 70 MG tablet TAKE 1 TABLET BY MOUTH EVERY 7 DAYS. TAKE IN THE MORNING ON AN EMPTY STOMACH WITH A FULL GLASS OF WATER 30 MINUTES BEFORE FOOD. 12 tablet 3     DULoxetine (CYMBALTA) 30 MG capsule Take 30 mg by mouth.       lisinopril-hydrochlorothiazide (PRINZIDE,ZESTORETIC) 20-25 mg per tablet Take 1 tablet by mouth daily.  1     sertraline (ZOLOFT) 100 MG tablet Take 150 mg by mouth daily.        No facility-administered medications prior to visit.          Lab Results     PTH   Date Value Ref Range Status   12/30/2016 23 10 - 86 pg/mL Final     Calcium    Date Value Ref Range Status   02/24/2021 9.5 8.5 - 10.5 mg/dL Final     Phosphorus   Date Value Ref Range Status   10/10/2017 2.0 (L) 2.5 - 4.5 mg/dL Final           Imaging Results   Last DEXA scan:  Results for orders placed in visit on 08/31/18   DXA Bone Density Scan    Narrative 8/31/2018      RE: Susan Patrick  YOB: 1945        Dear Kaila Miranda,    Patient Profile:  72 y.o. female, postmenopausal, is here for the follow up bone density   test.   History of fractures - Yes;  Wrist, Lumbar vertebrae and Hip. Family   history of osteoporosis - None.  Family history of hip fracture: None.   Smoking history - Past. Osteoporosis treatment past -  Yes;  Forteo.   Osteoporosis treatment current - Yes;  Bisphosphonates.  Chronic medical   problems - Height loss, Hip replacement , History of kidney stones,   Hysterectomy, Premature menopause and Spine surgery.  Also   hyperparathyroidism high risk medications -  None.    Assessment:    1. The spine bone density is best assessed using L1-L2 with T-score of   -0.1..  2. Femoral bone densities show osteoporosis with a left femoral neck T-   score of -2.7.  Bone density cannot be assessed on the right due to   previous right hip fracture with surgical repair  3.  Bone density was also checked in the wrist since the spine is of   suboptimal reliability and since the right hip could not be assessed.  In   the left 33% radius, low bone mass is seen with T score of -1.7.  4.  Since the scan in 2017, bone density has increased a significant 5.8%   in the left total hip.   Bone density has not significantly changed at   L1-L2 of the AP spine.    72 y.o. female with OSTEOPOROSIS which would be judged as severe given her   previous fracture history.  BMD changes since 2016 reflect a good response   to current management.    Recommendations:  A continuation of current management would be advised with a recheck in 2   years.      Bone densitometry was  performed on your patient using our [email or web address] iDXA   densitometer. The results are summarized and a copy of the actual scans   are included for your review. In conformity with the International Society   of Clinical Densitometry's most recent position statement for DXA   interpretation (2015), the diagnosis will be made on the lowest measured   T-score of the lumbar spine, femoral neck, total proximal femur or 33%   radius. Note the change in terminology for diagnostic classification from   OSTEOPENIA to LOW BONE MASS. All trending for sequential exams will be   done using multiple vertebrae or the total proximal femur. Fracture risk   is based on the WHO Fracture Risk Assessment Tool (FRAX). If additional   information is needed or if you would like to discuss the results, please   do not hesitate to call me.       Thank you for referring this patient to [facility].   We are happy to be of service in support of you and your practice. If you   have any questions or suggestions to improve our service, please call me   at [phone].     Sincerely,     Willis Connors M.D. [facility]ET[facility].  Osteoporosis Services, [facility]         Phone call duration: 20 minutes

## 2021-06-15 NOTE — PROGRESS NOTES
[facility]  ENDOCRINOLOGY    Osteoporosis Follow Up 2/7/2018    Susan Patrick, 1945, [id number]          Reason for visit      1. Osteoporosis        History     Susan Patrick is a very pleasant 72 y.o. old female who presents for follow up.   SUMMARY:  Susan returns today in f/u for Osteoporosis.  She continues in treatment with Forteo, which she will finish in August of this year.  Since her last appointment, she has had her R hip replaced, and finished her PT postoperatively.  She is walking with a cane, but mostly for stability. She can go without it, but feels safer when out and about, to use it.  They are happy with her healing, and I am sure that the Forteo in her system was helpful. She continues taking Calcium and Vit D in supplementation.          Risk Factors     The following high- risk conditions have been ruled out: celiac disease, eating disorders, gastric bypass, hyperparathyroidism, inflammatory bowel disease, hyperthyroidism, rheumatoid arthritis, lupus, chronic kidney disease.    Susna Partick has the following risk factors: Age, Female gender,  and Low BMI, hx of smoking    She is not on high risk medications such as glucocorticoids, anti-coagulants, anti-convulsants, chemotherapy or levothyroxine.    Patient deniesBreast cancer and Family history of breast cancer.        Past Medical History     Patient Active Problem List   Diagnosis     Pseudoarthrosis of lumbar spine     Low back pain     Fracture of lumbar vertebra     Osteoporosis     Vitamin D deficiency     Scheuermann's kyphosis     Non-traumatic compression fracture of T7 thoracic vertebra     Cigarette nicotine dependence in remission     Acute pain of right hip     Kyphosis of thoracic region     Parathyroid adenoma     Hypertension, goal below 140/80     PVD (peripheral vascular disease)     CVA (cerebral vascular accident)     Barahona esophagus     Major depression, recurrent     CKD (chronic kidney disease)  "stage 3, GFR 30-59 ml/min     Arthropathy of right hip     Status post total hip replacement, right     Cerebral infarction     Depression with anxiety     Iron deficiency anemia     Lactose intolerance     Pain medication agreement     History of colonic polyps     S/P lumbar spinal fusion       Family History       family history includes Cerebral aneurysm in her sister.    Social History      reports that she quit smoking about 11 years ago. She has a 7.50 pack-year smoking history. She has never used smokeless tobacco. She reports that she drinks alcohol. She reports that she does not use illicit drugs.      Review of Systems     Patient denies current pain, limited mobility, fractures.   Remainder per HPI.      Vital Signs     /76 (Patient Site: Right Arm, Patient Position: Sitting, Cuff Size: Adult Regular)  Pulse 70  Ht 5' 2\" (1.575 m)  Wt 150 lb 9.6 oz (68.3 kg)  BMI 27.55 kg/m2    Physical Exam     GENERAL:  Normal, NIRD  EYES:  Pupils equal, round and reactive to light; no proptosis, lid lag or  periorbital edema.  THYROID:  Thyroid is normal.  No tenderness or bruit  NECK: No lymph nodes  MUSCULOSKELETAL: No joint abnormalities, FROM in all four extremities. No kyphosis. Muscle strength grossly normal without evidence of wasting.  HEART:  Regular rate and rhythm without murmur.  LUNGS:  Clear to auscultation.  ABDOMEN:Soft, non-tender, no masses or organomegaly  NEURO:  Patella Reflexes were normal.No tremors  SKIN:  No acanthosis nigricans or vitiligo        Assessment     1. Osteoporosis        Plan     She will continue with the Forteo until completing therapy in August.  She will then start taking a Bisphosphonate to hang on to the new bone.  We will plan on another Dexa Scan after a year on the new medication, and she and I will f/u after that has been completed.       Total visit minutes:25  Time spent counseling and coordination of care:23    Lena ALBERT " Endocrinology  2/7/2018  10:39 AM      Current Medications     Outpatient Medications Prior to Visit   Medication Sig Dispense Refill     acetaminophen (TYLENOL) 500 MG tablet Take 1,000 mg by mouth every 6 (six) hours as needed for pain.       alum/mag hydrox-simethicone-diphenhydramine-lidocaine (MAGIC MOUTHWASH) suspension Swish and spit 15 mL 4 (four) times a day as needed.       amLODIPine (NORVASC) 10 MG tablet Take 10 mg by mouth daily.       azelastine (ASTELIN) 137 mcg (0.1 %) nasal spray 1 spray into each nostril 2 (two) times a day. Use in each nostril as directed       calcium carbonate-vitamin D3 600 mg(1,500mg) -800 unit per tablet Take 1 tablet by mouth daily.       cholecalciferol, vitamin D3, 1,000 unit tablet Take 3,000 Units by mouth daily.       clopidogrel (PLAVIX) 75 mg tablet Take 75 mg by mouth daily.       esomeprazole magnesium (NEXIUM 24 HR) 22.3 mg capsule Take 22.3 mg by mouth every morning before breakfast.       ferrous sulfate 325 (65 FE) MG tablet Take 1 tablet by mouth twice a day with lunch and supper.        lisinopril (PRINIVIL,ZESTRIL) 20 MG tablet Take 20 mg by mouth daily.       mirtazapine (REMERON) 30 MG tablet Take 30 mg by mouth bedtime.       MULTIVITAMIN ORAL Take 1 tablet by mouth daily.        pravastatin (PRAVACHOL) 40 MG tablet Take 40 mg by mouth bedtime.       sertraline (ZOLOFT) 100 MG tablet Take 150 mg by mouth daily.        teriparatide (FORTEO) 20 mcg/dose - 600 mcg/2.4 mL injection Inject 0.08 mL (20 mcg total) under the skin daily. 2.4 mL 6     aspirin 325 MG EC tablet Take 1 tablet (325 mg total) by mouth 2 (two) times a day. 60 tablet 0     traMADol (ULTRAM) 50 mg tablet Take 1 tablet (50 mg total) by mouth every 6 (six) hours as needed for pain. 40 tablet 0     No facility-administered medications prior to visit.          Lab Results     PTH   Date Value Ref Range Status   12/30/2016 23 10 - 86 pg/mL Final     Calcium   Date Value Ref Range Status    10/09/2017 8.8 8.5 - 10.5 mg/dL Final     Phosphorus   Date Value Ref Range Status   10/10/2017 2.0 (L) 2.5 - 4.5 mg/dL Final           Imaging Results   Last DEXA scan:  Results for orders placed in visit on 06/27/17   DXA Bone Density Scan    Narrative 6/27/2017      RE: Susan Patrick  YOB: 1945        Dear Lena Friedman,    Patient Profile:  71 y.o. female, postmenopausal, is here for the follow up bone density   test.   History of fractures - Yes;  Wrist and Hip. Family history of osteoporosis   - None.  Family history of hip fracture: None. Smoking history - Past.   Osteoporosis treatment past -  No. Osteoporosis treatment current - Yes;    Forteo.  Chronic medical problems - Height loss, Hip replacement ,   Hyperparathyroidism, Hysterectomy and Spine surgery. High risk medications   -  Blood thinner (Coumadin or Heparin);  Yes, Currently.    Assessment:    1. The spine bone density assessment is limited to L1 and L2 with T-score   of -0.3.  2. Femoral bone densities show osteoporosis with a left femoral neck T-   score of -2.7 and a left total hip T score of -2.6.  Bone density cannot   be assessed on the right due to previous right hip fracture  3.  Bone density was checked in the wrist as an alternate site since the   right hip could not be assessed and since the spine measurement is limited   by postsurgical change at L3 and L4.  In the left 33% radius, low bone   mass is seen with T score -1.4.  4.  Since the scan in 2016, mixed results on bone density are noted with   an increase of 11.5% at L1 and L2 with a decrease of 4.2% in the left   total hip.    71 y.o. female with OSTEOPOROSIS and HIGH predicted fracture risk     Recommendations:  Completing 2 years of Forteo would be advised with transition to an   antiresorptive agent at that time.  A recheck of bone density in 1-2 years   is recommended      Bone densitometry was performed on your patient using our [employer]    densitometer. The results are summarized and a copy of the actual scans   are included for your review. In conformity with the International Society   of Clinical Densitometry's most recent position statement for DXA   interpretation (2015), the diagnosis will be made on the lowest measured   T-score of the lumbar spine, femoral neck, total proximal femur or 33%   radius. Note the change in terminology for diagnostic classification from   OSTEOPENIA to LOW BONE MASS. All trending for sequential exams will be   done using multiple vertebrae or the total proximal femur. Fracture risk   is based on the WHO Fracture Risk Assessment Tool (FRAX). If additional   information is needed or if you would like to discuss the results, please   do not hesitate to call me.       Thank you for referring this patient to [facility] Osteoporosis [facility].   We are happy to be of service in support of you and your practice. If you   have any questions or suggestions to improve our service, please call me   at [phone].     Sincerely,     Willis Connors M.D. JUAN JOS[place]CYVETTE.  Osteoporosis Services, [facility]

## 2021-06-15 NOTE — TELEPHONE ENCOUNTER
Susan is having a right knee replacement (she believes it's a total knee) and was wanting Dr. Walsh's opinion if she thinks her body can handle it, or if there is anything going on in her back where she shouldn't have the knee surgery.    Patient can be reached at [phone] to discuss or can come in for an appointment.     Also, patient thought Dr. Walsh was going to refer her to the [facility] but I do not see that a referral was entered.

## 2021-06-15 NOTE — TELEPHONE ENCOUNTER
LVMTCB on changes or else pt has f/u on 3/2/21 can discuss then    ----- Message from Lena Friedman NP sent at 2/25/2021  9:29 AM CST -----  Please let Susan know that her Vit D level is just barely normal - double her current dose, or start at 2000 international unit(s) daily

## 2021-06-15 NOTE — TELEPHONE ENCOUNTER
Patient has upcoming appointment with Eneida Friedman NP in Endocrinology on Wednesday 3/03/21, checking in at 1:45 p.m.    This appointment needs to be changed to a virtual visit as provider is running virtual clinics currently.  Please call patient and update visit to a telephone or video visit.  Thank you.    Called patient and LMTCB

## 2021-06-16 PROBLEM — Z96.1 PSEUDOPHAKIA, BOTH EYES: Status: ACTIVE | Noted: 2017-05-19

## 2021-06-16 PROBLEM — M16.11 ARTHROPATHY OF RIGHT HIP: Status: ACTIVE | Noted: 2017-06-13

## 2021-06-16 PROBLEM — A41.9 SEVERE SEPSIS (H): Status: ACTIVE | Noted: 2018-12-16

## 2021-06-16 PROBLEM — Z96.641 STATUS POST TOTAL HIP REPLACEMENT, RIGHT: Status: ACTIVE | Noted: 2017-10-06

## 2021-06-16 PROBLEM — M54.16 LUMBAR RADICULOPATHY: Status: ACTIVE | Noted: 2018-11-09

## 2021-06-16 PROBLEM — M71.22 SYNOVIAL CYST OF LEFT POPLITEAL SPACE: Status: ACTIVE | Noted: 2021-02-10

## 2021-06-16 PROBLEM — N20.1 URETERAL STONE: Status: ACTIVE | Noted: 2018-10-31

## 2021-06-16 PROBLEM — R33.9 URINARY RETENTION WITH INCOMPLETE BLADDER EMPTYING: Status: ACTIVE | Noted: 2018-11-26
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
## 2021-06-16 PROBLEM — R78.81 BACTEREMIA DUE TO ESCHERICHIA COLI: Status: ACTIVE | Noted: 2018-12-20

## 2021-06-16 PROBLEM — R65.20 SEVERE SEPSIS (H): Status: ACTIVE | Noted: 2018-12-16

## 2021-06-16 PROBLEM — Z96.651 S/P TKR (TOTAL KNEE REPLACEMENT), RIGHT: Status: ACTIVE | Noted: 2021-04-26

## 2021-06-16 PROBLEM — B96.20 BACTEREMIA DUE TO ESCHERICHIA COLI: Status: ACTIVE | Noted: 2018-12-20

## 2021-06-16 PROBLEM — M17.11 PRIMARY OSTEOARTHRITIS OF RIGHT KNEE: Status: ACTIVE | Noted: 2021-02-10
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
## 2021-06-17 NOTE — PROGRESS NOTES
After reviewing pt's lumbar CT and hips xrays with Dr. Dumont, called Susan and let her know she should follow up in clinic with Dr. Dumont with Flex/Ext xrays. Reminded her to bring a CD from [place] to [facility].  Tania York, RN, CNRN

## 2021-06-17 NOTE — PROGRESS NOTES
Code Status:  FULL CODE  Visit Type: Problem Visit (Right total knee arthroplasty)     Facility:  [facility] [[id number]]             History of Present Illness: Susan Patrick is a 75 y.o. female who I am seeing today for follow up on the TCU s/p right total knee arthroplasty secondary to DJD. Past medical history includes peripheral vascular disease with a history of femoropopliteal bypass with stenting, hypertension, cerebral infarct, iron deficiency anemia chronic, CAD and chronic kidney disease stage III.  Patient underwent right total knee arthroplasty on 4/26/2021.  Postoperative complications included some acute blood loss anemia.  Hemoglobin 10.6.  She does have a history of chronic iron deficiency anemia.  She is followed by [place] [facility] outpatient.  She does receive regular iron infusions.  She has a history of bone marrow biopsy and malignancy evaluation in the past that was unremarkable.  She tells me today that she did receive iron transfusion a few weeks prior to her surgery.  Peripheral vascular disease with a history of femoropopliteal bypass.  She continues on aspirin and Plavix.  Also on statin.  Hypertension on amlodipine, hydrochlorothiazide and lisinopril.  She did have some intermittently high blood pressures during hospitalization.  This was thought to be secondary to pain.  History rule cerebral infarct on aspirin Plavix and statin.  CAD.  Stage III chronic kidney disease with creatinine around 0.83.    Today patient sitting up in bedside chair.  She has had some increased pain.  Continues on Vicodin for pain.  She is using ice.  She does have moderate edema surrounding the incision.  She denies any chest pain.  She is having some expiratory wheezing.  Coarse wet cough also noted.  Patient afebrile.  We will get a chest x-ray.  No real lower extremity edema.  She has fair appetite.  She is having regular bowel movements.  Hemoglobin 8.8.      Active Ambulatory Problems      Diagnosis Date Noted     Pseudoarthrosis of lumbar spine 04/05/2016     Low back pain 04/05/2016     Fracture of lumbar vertebra (H) 04/05/2016     Vitamin D deficiency 04/26/2016     Scheuermann's kyphosis 07/19/2016     Nontraumatic compression fracture of T7 vertebra (H) 11/14/2016     Cigarette nicotine dependence in remission 11/14/2016     Acute pain of right hip 11/14/2016     Kyphosis of thoracic region 11/14/2016     Parathyroid adenoma 12/06/2016     Hypertension, goal below 140/80 12/30/2016     PVD (peripheral vascular disease) (H) 08/24/2010     CVA (cerebral vascular accident) (H) 12/30/2016     Barahona esophagus 02/09/2012     Major depression, recurrent (H) 12/30/2016     Stage 3a chronic kidney disease 12/30/2016     Arthropathy of right hip 06/13/2017     Status post total hip replacement, right 10/06/2017     Brain aneurysm 08/01/2006     Iron deficiency anemia 02/22/2016     Lactose intolerance 07/23/2013     Pain medication agreement 03/13/2015     History of colonic polyps 02/09/2012     S/P lumbar spinal fusion 12/05/2012     Lumbar radiculopathy 11/09/2018     Lumbar pseudoarthrosis      Postoperative anemia      Urinary retention with incomplete bladder emptying 11/26/2018     Severe sepsis (H) 12/16/2018     Acute cystitis without hematuria      Hypokalemia      LADARIUS (acute kidney injury) (H)      Bacteremia due to Escherichia coli 12/20/2018     CAD (coronary artery disease) 03/07/2012     Pseudophakia, both eyes 05/19/2017     Senile osteoporosis 05/09/2012     Ureteral stone 10/31/2018     Xerostomia 09/16/2016     Aftercare following right knee joint replacement surgery 12/18/2012     Fall at home 12/30/2019     Primary osteoarthritis of right knee 02/10/2021     Synovial cyst of left popliteal space 02/10/2021     S/P TKR (total knee replacement), right 04/26/2021     Resolved Ambulatory Problems     Diagnosis Date Noted     Flatback syndrome of lumbar region 04/05/2016      Osteoporosis 04/05/2016     Depression with anxiety 10/18/2011     Past Medical History:   Diagnosis Date     Anemia      Anxiety      Arthritis      Barahona's esophagus      Cerebral aneurysm      Chronic kidney disease      Colon polyps      Coronary artery disease      Depression      GERD (gastroesophageal reflux disease)      History of blood clots      History of transfusion      Hyperlipidemia      Hypertension      Lumbar vertebral fracture (H)      Right hip pain      Stroke (H)      Current Outpatient Medications   Medication Sig     acetaminophen (TYLENOL) 500 MG tablet Take 1,000 mg by mouth every 6 (six) hours.     alendronate (FOSAMAX) 70 MG tablet TAKE 1 TABLET BY MOUTH EVERY 7 DAYS. TAKE IN THE MORNING ON AN EMPTY STOMACH WITH A FULL GLASS OF WATER 30 MINUTES BEFORE FOOD.     amLODIPine (NORVASC) 10 MG tablet Take 10 mg by mouth daily.     aspirin 81 mg chewable tablet Chew 81 mg daily.     calcium carbonate-vitamin D3 600 mg(1,500mg) -800 unit per tablet Take 1 tablet by mouth daily.     cholecalciferol, vitamin D3, 1,000 unit tablet Take 3,000 Units by mouth daily.     clopidogrel (PLAVIX) 75 mg tablet Take 75 mg by mouth daily.     cyanocobalamin 1000 MCG tablet Take 1,000 mcg by mouth daily.     esomeprazole (NEXIUM) 20 MG capsule Take 20 mg by mouth daily before breakfast.     gabapentin (NEURONTIN) 100 MG capsule Take 100 mg by mouth 3 (three) times a day. Gradually taper up per your doctors instructions     hydroCHLOROthiazide (HYDRODIURIL) 25 MG tablet Take 25 mg by mouth daily.     HYDROcodone-acetaminophen 5-325 mg per tablet Take 1-2 tablets by mouth every 4 (four) hours as needed for pain.     lisinopriL (PRINIVIL,ZESTRIL) 40 MG tablet Take 40 mg by mouth daily.     mirtazapine (REMERON) 30 MG tablet Take 30 mg by mouth bedtime.     MULTIVITAMIN ORAL Take 1 tablet by mouth daily.      pravastatin (PRAVACHOL) 40 MG tablet Take 40 mg by mouth bedtime.     senna (SENOKOT) 8.6 mg tablet Take  2 tablets by mouth 2 (two) times a day.     sertraline (ZOLOFT) 50 MG tablet TAKE 3 TABLETS BY MOUTH EVERY MORNING       Allergies   Allergen Reactions     Lactose Other (See Comments)     Diarrhea; Lactose intolerant     Metoprolol Unknown     Bradycardia, heart rate in 30's      Percocet [Oxycodone-Acetaminophen] Other (See Comments)     hallucinations     Augmentin [Amoxicillin-Pot Clavulanate] Hives, Swelling and Rash     Caused sores, facial swelling         Review of Systems  No fevers or chills. No headache, lightheadedness or dizziness. No SOB, chest pains or palpitations. Appetite is fair. No nausea, vomiting, constipation or diarrhea. No dysuria, frequency, burning or pain with urination.  Patient continues on hydrocodone for pain.  Otherwise review of systems are negative.     Physical Exam  PHYSICAL EXAMINATION:  Vital signs: /74, pulse 63, respirations 20, temperature 97.2, O2 sat 93% on room air.  Weight 141.2 pounds.  General: Awake, Alert, oriented x3, appropriately, follows simple commands, conversant  HEENT:PERRLA, Pink conjunctiva, anicteric sclerae,  dry oral mucosa  NECK: Supple, without any lymphadenopathy, or masses  CVS:  S1  S2, without murmur or gallop.   LUNG: Expiratory wheezing throughout noted.  Coarse wet cough.  BACK: No kyphosis of the thoracic spine  ABDOMEN: Soft, nontender to palpation, with positive bowel sounds  EXTREMITIES: Movesboth upper and lower extremities with diffuse weakness, trace pedal edema greater on the surgical side, no calf tenderness.   SKIN: Left knee incision covered with Tegaderm dressing.  She does have moderate bruising and swelling surrounding.  NEUROLOGIC: Intact, pulses palpable  PSYCHIATRIC: Cognition intact      Labs:    Lab Results   Component Value Date    WBC 7.6 01/09/2019    HGB 8.8 (L) 01/09/2019    HCT 29.6 (L) 01/09/2019    MCV 85 01/09/2019     01/09/2019     Results for orders placed or performed during the hospital encounter of  12/16/18   Basic Metabolic Panel   Result Value Ref Range    Sodium 143 136 - 145 mmol/L    Potassium 4.4 3.5 - 5.0 mmol/L    Chloride 113 (H) 98 - 107 mmol/L    CO2 21 (L) 22 - 31 mmol/L    Anion Gap, Calculation 9 5 - 18 mmol/L    Glucose 107 70 - 125 mg/dL    Calcium 8.9 8.5 - 10.5 mg/dL    BUN 11 8 - 28 mg/dL    Creatinine 0.67 0.60 - 1.10 mg/dL    GFR MDRD Af Amer >60 >60 mL/min/1.73m2    GFR MDRD Non Af Amer >60 >60 mL/min/1.73m2           Assessment/Plan:  ,  1. S/P TKR (total knee replacement), right   continues in therapy.  Pain control with hydrocodone.   2. Iron deficiency anemia, unspecified iron deficiency anemia type   patient receives iron transfusions to the [facility] oncology.  Hemoglobin 8.8.  Follow-up CBC in the a.m.   3. Stage 3a chronic kidney disease   creatinine stable 0.83.  Follow-up BMP in the a.m.   4. Essential hypertension   satisfactory controlled.    5. Peripheral vascular disease (H)   continues on Plavix, aspirin and statin.    6. Coronary artery disease due to lipid rich plaque   continues on Plavix, aspirin and statin.     35 minutes minutes spent of which greater than 50% % was face to face communication with the patient reviewing pain management, history of anemia as well as collaborating with nursing staff and therapy.    This note has been dictated using voice recognition software. Any grammatical or context distortions are unintentional and inherent to the software    Electronically signed by: Mayi Clifford, MOHSEN

## 2021-06-17 NOTE — PROGRESS NOTES
Code Status:  FULL CODE  Visit Type: Discharge Summary     Facility:  [facility] [[id number]]             History of Present Illness: Susan Patrick is a 75 y.o. female who I am seeing today for discharge from  the TCU s/p right total knee arthroplasty secondary to DJD. Past medical history includes peripheral vascular disease with a history of femoropopliteal bypass with stenting, hypertension, cerebral infarct, iron deficiency anemia chronic, CAD and chronic kidney disease stage III.  Patient underwent right total knee arthroplasty on 4/26/2021.  Postoperative complications included some acute blood loss anemia.  Hemoglobin 10.6.  She does have a history of chronic iron deficiency anemia.  She is followed by [place] [facility] outpatient.  She does receive regular iron infusions.  She has a history of bone marrow biopsy and malignancy evaluation in the past that was unremarkable.  She tells me today that she did receive iron transfusion a few weeks prior to her surgery.  Peripheral vascular disease with a history of femoropopliteal bypass.  She continues on aspirin and Plavix.  Also on statin.  Hypertension on amlodipine, hydrochlorothiazide and lisinopril.  She did have some intermittently high blood pressures during hospitalization.  This was thought to be secondary to pain.  History rule cerebral infarct on aspirin Plavix and statin.  CAD.  Stage III chronic kidney disease with creatinine around 0.83.    Today patient sitting up in bedside chair. Pt s/p right total knee arthroplasty. Pain well controlled with hydrocodone and tylenol. Pt ambulatory with rolling walker. Pt is fearful her knee is crooked. She has follow up with surgeon today. Chronic anemia. Pt has received iron transfusion in the past. Hbg 10.8. She continues on B12 supplement. Pt edema improving. Wheezing now subsided. She continues on IS. Blood pressure satisfactory.         Active Ambulatory Problems     Diagnosis Date Noted      Pseudoarthrosis of lumbar spine 04/05/2016     Low back pain 04/05/2016     Fracture of lumbar vertebra (H) 04/05/2016     Vitamin D deficiency 04/26/2016     Scheuermann's kyphosis 07/19/2016     Nontraumatic compression fracture of T7 vertebra (H) 11/14/2016     Cigarette nicotine dependence in remission 11/14/2016     Acute pain of right hip 11/14/2016     Kyphosis of thoracic region 11/14/2016     Parathyroid adenoma 12/06/2016     Hypertension, goal below 140/80 12/30/2016     PVD (peripheral vascular disease) (H) 08/24/2010     CVA (cerebral vascular accident) (H) 12/30/2016     Bar[place]ona esophagus 02/09/2012     Major depression, recurrent (H) 12/30/2016     Stage 3a chronic kidney disease 12/30/2016     Arthropathy of right hip 06/13/2017     Status post total hip replacement, right 10/06/2017     Brain aneurysm 08/01/2006     Iron deficiency anemia 02/22/2016     Lactose intolerance 07/23/2013     Pain medication agreement 03/13/2015     History of colonic polyps 02/09/2012     S/P lumbar spinal fusion 12/05/2012     Lumbar radiculopathy 11/09/2018     Lumbar pseudoarthrosis      Postoperative anemia      Urinary retention with incomplete bladder emptying 11/26/2018     Severe sepsis (H) 12/16/2018     Acute cystitis without hematuria      Hypokalemia      LADARIUS (acute kidney injury) (H)      Bacteremia due to Escherichia coli 12/20/2018     CAD (coronary artery disease) 03/07/2012     Pseudophakia, both eyes 05/19/2017     Senile osteoporosis 05/09/2012     Ureteral stone 10/31/2018     Xerostomia 09/16/2016     Aftercare following right knee joint replacement surgery 12/18/2012     Fall at home 12/30/2019     Primary osteoarthritis of right knee 02/10/2021     Synovial cyst of left popliteal space 02/10/2021     S/P TKR (total knee replacement), right 04/26/2021     Resolved Ambulatory Problems     Diagnosis Date Noted     Flatback syndrome of lumbar region 04/05/2016     Osteoporosis 04/05/2016      Depression with anxiety 10/18/2011     Past Medical History:   Diagnosis Date     Anemia      Anxiety      Arthritis      Barahona's esophagus      Cerebral aneurysm      Chronic kidney disease      Colon polyps      Coronary artery disease      Depression      GERD (gastroesophageal reflux disease)      History of blood clots      History of transfusion      Hyperlipidemia      Hypertension      Lumbar vertebral fracture (H)      Right hip pain      Stroke (H)      Current Outpatient Medications   Medication Sig     acetaminophen (TYLENOL) 500 MG tablet Take 1,000 mg by mouth every 6 (six) hours.     alendronate (FOSAMAX) 70 MG tablet TAKE 1 TABLET BY MOUTH EVERY 7 DAYS. TAKE IN THE MORNING ON AN EMPTY STOMACH WITH A FULL GLASS OF WATER 30 MINUTES BEFORE FOOD.     amLODIPine (NORVASC) 10 MG tablet Take 10 mg by mouth daily.     aspirin 81 mg chewable tablet Chew 81 mg daily.     calcium carbonate-vitamin D3 600 mg(1,500mg) -800 unit per tablet Take 1 tablet by mouth daily.     cholecalciferol, vitamin D3, 1,000 unit tablet Take 3,000 Units by mouth daily.     clopidogrel (PLAVIX) 75 mg tablet Take 75 mg by mouth daily.     cyanocobalamin 1000 MCG tablet Take 1,000 mcg by mouth daily.     esomeprazole (NEXIUM) 20 MG capsule Take 20 mg by mouth daily before breakfast.     gabapentin (NEURONTIN) 100 MG capsule Take 100 mg by mouth 3 (three) times a day. Gradually taper up per your doctors instructions     hydroCHLOROthiazide (HYDRODIURIL) 25 MG tablet Take 25 mg by mouth daily.     HYDROcodone-acetaminophen 5-325 mg per tablet Take 1-2 tablets by mouth every 4 (four) hours as needed for pain.     lisinopriL (PRINIVIL,ZESTRIL) 40 MG tablet Take 40 mg by mouth daily.     mirtazapine (REMERON) 30 MG tablet Take 30 mg by mouth bedtime.     MULTIVITAMIN ORAL Take 1 tablet by mouth daily.      pravastatin (PRAVACHOL) 40 MG tablet Take 40 mg by mouth bedtime.     senna (SENOKOT) 8.6 mg tablet Take 2 tablets by mouth 2 (two)  times a day.     sertraline (ZOLOFT) 50 MG tablet TAKE 3 TABLETS BY MOUTH EVERY MORNING       Allergies   Allergen Reactions     Lactose Other (See Comments)     Diarrhea; Lactose intolerant     Metoprolol Unknown     Bradycardia, heart rate in 30's      Percocet [Oxycodone-Acetaminophen] Other (See Comments)     hallucinations     Augmentin [Amoxicillin-Pot Clavulanate] Hives, Swelling and Rash     Caused sores, facial swelling         Review of Systems  No fevers or chills. No headache, lightheadedness or dizziness. No SOB, chest pains or palpitations. Appetite is fair. No nausea, vomiting, constipation or diarrhea. No dysuria, frequency, burning or pain with urination.  Patient continues on hydrocodone for pain.  Otherwise review of systems are negative.     Physical Exam  PHYSICAL EXAMINATION:  Vital signs: /49, pulse 58, respirations 18, temperature 97.5, O2 sat 97% on room air.  Weight 134.8 pounds.  General: Awake, Alert, oriented x3, appropriately, follows simple commands, conversant  HEENT:PERRLA, Pink conjunctiva, anicteric sclerae,  dry oral mucosa  NECK: Supple, without any lymphadenopathy, or masses  CVS:  S1  S2, without murmur or gallop.   LUNG: Occasional expiratory wheeze. No cough.   BACK: No kyphosis of the thoracic spine  ABDOMEN: Soft, nontender to palpation, with positive bowel sounds  EXTREMITIES: Movesboth upper and lower extremities with diffuse weakness, 1+ pedal edema greater on the surgical side, no calf tenderness.   SKIN: Left knee incision covered with Tegaderm dressing.  She does have moderate bruising and swelling surrounding.  NEUROLOGIC: Intact, pulses palpable  PSYCHIATRIC: Cognition intact      Labs:    Lab Results   Component Value Date    WBC 10.6 04/30/2021    HGB 10.8 (L) 05/03/2021    HCT 35.5 04/30/2021    MCV 95 04/30/2021     04/30/2021     Results for orders placed or performed in visit on 04/30/21   Basic Metabolic Panel   Result Value Ref Range    Sodium  137 136 - 145 mmol/L    Potassium 4.1 3.5 - 5.0 mmol/L    Chloride 98 98 - 107 mmol/L    CO2 30 22 - 31 mmol/L    Anion Gap, Calculation 9 5 - 18 mmol/L    Glucose 96 70 - 125 mg/dL    Calcium 10.0 8.5 - 10.5 mg/dL    BUN 20 8 - 28 mg/dL    Creatinine 0.79 0.60 - 1.10 mg/dL    GFR MDRD Af Amer >60 >60 mL/min/1.73m2    GFR MDRD Non Af Amer >60 >60 mL/min/1.73m2           Assessment/Plan:  ,  1. S/P TKR (total knee replacement), right   continues in therapy.  Pain control with hydrocodone.   2. Iron deficiency anemia, unspecified iron deficiency anemia type   patient receives iron transfusions to the [facility] oncology.  Hemoglobin  Now 10.8.   3. Stage 3a chronic kidney disease   creatinine stable follow-up BMP unremarkable..    4. Essential hypertension   satisfactory controlled.    5. Peripheral vascular disease (H)   continues on Plavix, aspirin and statin.    6. Coronary artery disease due to lipid rich plaque   continues on Plavix, aspirin and statin.     Ok to discharge home with current meds, treatments and narcotics. Follow up with ortho today. Follow up with PCP in 1 week. Home PT, OT, HHA and RN for medication management. Follow up with hematology out pt.     DISCHARGE PLAN/FACE TO FACE:  I certify that this patient is under my care and that I, or a nurse practitioner or physician's assistant working with me, had a face-to-face encounter that meets the physician face-to-face encounter requirements with this patient.       I certify that, based on my findings, the following services are medically necessary home health services.    My clinical findings support the need for the above skilled services.    This patient is homebound because: S/P R TKA.     Total time spent for this visit was 35 minutes with > 50% of time spent face to face with pt reviewing discharge medications, home care and follow ups.     The patient is, or has been, under my care and I have initiated the establishment of the plan of care. This  patient will be followed by a physician who will periodically review the plan of care.      This note has been dictated using voice recognition software. Any grammatical or context distortions are unintentional and inherent to the software    Electronically signed by: Mayi Clifford CNP

## 2021-06-17 NOTE — PROGRESS NOTES
Code Status:  FULL CODE  Visit Type: Problem Visit (right TKA)     Facility:  [facility] [[id number]]             History of Present Illness: Susan Patrick is a 75 y.o. female who I am seeing today for follow up on the TCU s/p right total knee arthroplasty secondary to DJD. Past medical history includes peripheral vascular disease with a history of femoropopliteal bypass with stenting, hypertension, cerebral infarct, iron deficiency anemia chronic, CAD and chronic kidney disease stage III.  Patient underwent right total knee arthroplasty on 4/26/2021.  Postoperative complications included some acute blood loss anemia.  Hemoglobin 10.6.  She does have a history of chronic iron deficiency anemia.  She is followed by [place] [facility] outpatient.  She does receive regular iron infusions.  She has a history of bone marrow biopsy and malignancy evaluation in the past that was unremarkable.  She tells me today that she did receive iron transfusion a few weeks prior to her surgery.  Peripheral vascular disease with a history of femoropopliteal bypass.  She continues on aspirin and Plavix.  Also on statin.  Hypertension on amlodipine, hydrochlorothiazide and lisinopril.  She did have some intermittently high blood pressures during hospitalization.  This was thought to be secondary to pain.  History rule cerebral infarct on aspirin Plavix and statin.  CAD.  Stage III chronic kidney disease with creatinine around 0.83.    Today patient sitting up in bedside chair. Right total knee arthroplasty. Incision covered with Tegaderm. She continues on hydrocodone and Tylenol for pain. Hemoglobin 10.8. She does have some increased lower extremity edema noted on today's visit. We will have her wear RUPERTO hose. On my last visit she did have a coarse wet cough. Chest x-ray was negative for any acute process. She has been doing incentive spirometer. Today only occasional wheeze. No cough. Recent creatinine within normal  limits. She is eating and drinking well. She is having regular bowel movements.      Active Ambulatory Problems     Diagnosis Date Noted     Pseudoarthrosis of lumbar spine 04/05/2016     Low back pain 04/05/2016     Fracture of lumbar vertebra (H) 04/05/2016     Vitamin D deficiency 04/26/2016     Scheuermann's kyphosis 07/19/2016     Nontraumatic compression fracture of T7 vertebra (H) 11/14/2016     Cigarette nicotine dependence in remission 11/14/2016     Acute pain of right hip 11/14/2016     Kyphosis of thoracic region 11/14/2016     Parathyroid adenoma 12/06/2016     Hypertension, goal below 140/80 12/30/2016     PVD (peripheral vascular disease) (H) 08/24/2010     CVA (cerebral vascular accident) (H) 12/30/2016     [place]ona esophagus 02/09/2012     Major depression, recurrent (H) 12/30/2016     Stage 3a chronic kidney disease 12/30/2016     Arthropathy of right hip 06/13/2017     Status post total hip replacement, right 10/06/2017     Brain aneurysm 08/01/2006     Iron deficiency anemia 02/22/2016     Lactose intolerance 07/23/2013     Pain medication agreement 03/13/2015     History of colonic polyps 02/09/2012     S/P lumbar spinal fusion 12/05/2012     Lumbar radiculopathy 11/09/2018     Lumbar pseudoarthrosis      Postoperative anemia      Urinary retention with incomplete bladder emptying 11/26/2018     Severe sepsis (H) 12/16/2018     Acute cystitis without hematuria      Hypokalemia      LADARIUS (acute kidney injury) (H)      Bacteremia due to Escherichia coli 12/20/2018     CAD (coronary artery disease) 03/07/2012     Pseudophakia, both eyes 05/19/2017     Senile osteoporosis 05/09/2012     Ureteral stone 10/31/2018     Xerostomia 09/16/2016     Aftercare following right knee joint replacement surgery 12/18/2012     Fall at home 12/30/2019     Primary osteoarthritis of right knee 02/10/2021     Synovial cyst of left popliteal space 02/10/2021     S/P TKR (total knee replacement), right 04/26/2021      Resolved Ambulatory Problems     Diagnosis Date Noted     Flatback syndrome of lumbar region 04/05/2016     Osteoporosis 04/05/2016     Depression with anxiety 10/18/2011     Past Medical History:   Diagnosis Date     Anemia      Anxiety      Arthritis      Barahona's esophagus      Cerebral aneurysm      Chronic kidney disease      Colon polyps      Coronary artery disease      Depression      GERD (gastroesophageal reflux disease)      History of blood clots      History of transfusion      Hyperlipidemia      Hypertension      Lumbar vertebral fracture (H)      Right hip pain      Stroke (H)      Current Outpatient Medications   Medication Sig     acetaminophen (TYLENOL) 500 MG tablet Take 1,000 mg by mouth every 6 (six) hours.     alendronate (FOSAMAX) 70 MG tablet TAKE 1 TABLET BY MOUTH EVERY 7 DAYS. TAKE IN THE MORNING ON AN EMPTY STOMACH WITH A FULL GLASS OF WATER 30 MINUTES BEFORE FOOD.     amLODIPine (NORVASC) 10 MG tablet Take 10 mg by mouth daily.     aspirin 81 mg chewable tablet Chew 81 mg daily.     calcium carbonate-vitamin D3 600 mg(1,500mg) -800 unit per tablet Take 1 tablet by mouth daily.     cholecalciferol, vitamin D3, 1,000 unit tablet Take 3,000 Units by mouth daily.     clopidogrel (PLAVIX) 75 mg tablet Take 75 mg by mouth daily.     cyanocobalamin 1000 MCG tablet Take 1,000 mcg by mouth daily.     esomeprazole (NEXIUM) 20 MG capsule Take 20 mg by mouth daily before breakfast.     gabapentin (NEURONTIN) 100 MG capsule Take 100 mg by mouth 3 (three) times a day. Gradually taper up per your doctors instructions     hydroCHLOROthiazide (HYDRODIURIL) 25 MG tablet Take 25 mg by mouth daily.     HYDROcodone-acetaminophen 5-325 mg per tablet Take 1-2 tablets by mouth every 4 (four) hours as needed for pain.     lisinopriL (PRINIVIL,ZESTRIL) 40 MG tablet Take 40 mg by mouth daily.     mirtazapine (REMERON) 30 MG tablet Take 30 mg by mouth bedtime.     MULTIVITAMIN ORAL Take 1 tablet by mouth  daily.      pravastatin (PRAVACHOL) 40 MG tablet Take 40 mg by mouth bedtime.     senna (SENOKOT) 8.6 mg tablet Take 2 tablets by mouth 2 (two) times a day.     sertraline (ZOLOFT) 50 MG tablet TAKE 3 TABLETS BY MOUTH EVERY MORNING       Allergies   Allergen Reactions     Lactose Other (See Comments)     Diarrhea; Lactose intolerant     Metoprolol Unknown     Bradycardia, heart rate in 30's      Percocet [Oxycodone-Acetaminophen] Other (See Comments)     hallucinations     Augmentin [Amoxicillin-Pot Clavulanate] Hives, Swelling and Rash     Caused sores, facial swelling         Review of Systems  No fevers or chills. No headache, lightheadedness or dizziness. No SOB, chest pains or palpitations. Appetite is fair. No nausea, vomiting, constipation or diarrhea. No dysuria, frequency, burning or pain with urination.  Patient continues on hydrocodone for pain.  Otherwise review of systems are negative.     Physical Exam  PHYSICAL EXAMINATION:  Vital signs: /66, pulse 64, respirations 18, temperature 97.7, O2 sat 96% on room air.  Weight 136.4 pounds.  General: Awake, Alert, oriented x3, appropriately, follows simple commands, conversant  HEENT:PERRLA, Pink conjunctiva, anicteric sclerae,  dry oral mucosa  NECK: Supple, without any lymphadenopathy, or masses  CVS:  S1  S2, without murmur or gallop.   LUNG: Occasional expiratory wheeze. No cough.   BACK: No kyphosis of the thoracic spine  ABDOMEN: Soft, nontender to palpation, with positive bowel sounds  EXTREMITIES: Movesboth upper and lower extremities with diffuse weakness, 1+ pedal edema greater on the surgical side, no calf tenderness.   SKIN: Left knee incision covered with Tegaderm dressing.  She does have moderate bruising and swelling surrounding.  NEUROLOGIC: Intact, pulses palpable  PSYCHIATRIC: Cognition intact      Labs:    Lab Results   Component Value Date    WBC 10.6 04/30/2021    HGB 10.8 (L) 05/03/2021    HCT 35.5 04/30/2021    MCV 95 04/30/2021      04/30/2021     Results for orders placed or performed in visit on 04/30/21   Basic Metabolic Panel   Result Value Ref Range    Sodium 137 136 - 145 mmol/L    Potassium 4.1 3.5 - 5.0 mmol/L    Chloride 98 98 - 107 mmol/L    CO2 30 22 - 31 mmol/L    Anion Gap, Calculation 9 5 - 18 mmol/L    Glucose 96 70 - 125 mg/dL    Calcium 10.0 8.5 - 10.5 mg/dL    BUN 20 8 - 28 mg/dL    Creatinine 0.79 0.60 - 1.10 mg/dL    GFR MDRD Af Amer >60 >60 mL/min/1.73m2    GFR MDRD Non Af Amer >60 >60 mL/min/1.73m2           Assessment/Plan:  ,  1. S/P TKR (total knee replacement), right   continues in therapy.  Pain control with hydrocodone.   2. Iron deficiency anemia, unspecified iron deficiency anemia type   patient receives iron transfusions to the [facility] oncology.  Hemoglobin  Now 10.8.   3. Stage 3a chronic kidney disease   creatinine stable follow-up BMP unremarkable..    4. Essential hypertension   satisfactory controlled.    5. Peripheral vascular disease (H)   continues on Plavix, aspirin and statin.    6. Coronary artery disease due to lipid rich plaque   continues on Plavix, aspirin and statin.       This note has been dictated using voice recognition software. Any grammatical or context distortions are unintentional and inherent to the software    Electronically signed by: Mayi Clifford, CNP

## 2021-06-19 NOTE — PROGRESS NOTES
NEUROSURGERY CONSULTATION NOTE    7/6/2018    CHIEF COMPLAINT: Pseudoarthrosis    HPI:    Susan Patrick is a 72 y.o. female with a past medical history significant for osteoporosis on Forteo who is sent to us in consultation by Loren Casanova for further evaluation regarding lumbar pseudoarthrosis after a prior posterior lateral fusion with instrumentation at L3-4, L4-5 performed by Dr. Dumont on 7/3/2012.  She additionally has a prior history of bilateral pedicle stress fractures at L2 and a prior T7 compression fracture.    Susan overall reports mild back pain.  She denies any symptoms that radiate into the lower extremities.  She notes that her back pain fluctuates and is aggravated with walking, bending, yardwork.  She notes that it is relieved with Tylenol and rest.  She denies any pain with sitting or laying flat.  She denies any associated numbness or tingling, weakness, changes in her bowel or bladder.  She notes that she experiences back pain approximately 4 out of 7 days of the week, however she does have days that are relatively pain-free.    Susan underwent a lumbar spine CT scan earlier this spring which demonstrated evidence of lumbar pseudoarthrosis in association with her prior instrumentation.  The lucency surrounding her L5 screws has progressed since her last CT scan.  She presents to neurosurgery clinic today for further evaluation and surgical recommendations.    Past Medical History:   Diagnosis Date     Anemia     Iron deficiency     Anxiety      Arthritis      Barahona's esophagus      Cerebral aneurysm     right leg weakness residual     Chronic kidney disease     CKD stage 3     Colon polyps     past hx.     Coronary artery disease      Depression      GERD (gastroesophageal reflux disease)      History of blood clots      History of transfusion      Hyperlipidemia      Hypertension      Lactose intolerance      Lumbar vertebral fracture (H)      Osteoporosis      Parathyroid  adenoma      PVD (peripheral vascular disease) (H)      Right hip pain      Stroke (H)     cerebral infarction-aneurysm     Vitamin D deficiency      Xerostomia     dryness in mouth     Past Surgical History:   Procedure Laterality Date     APPENDECTOMY       BACK SURGERY       CEREBRAL ANEURYSM REPAIR  2006    clipped     CEREBRAL ANEURYSM REPAIR       CERVICAL LAMINECTOMY      C6-7     CHOLECYSTECTOMY       EYE SURGERY Bilateral     cat     FEMORAL BYPASS  1995    left     FRACTURE SURGERY Right     hip pinning     HEMORRHOID SURGERY       HYSTERECTOMY      BSO     LUMBAR FUSION      L3-4, L4-5     PARATHYROIDECTOMY N/A 12/30/2016    Procedure: NECK EXPLORATION FOR PARATHYROID ;  Surgeon: Gilberto Snowden MD;  Location: [facility];  Service:      REVISION TOTAL HIP ARTHROPLASTY Right 10/6/2017    Procedure: REMOVAL OF TROCHANTERIC NAIL CONVERSION TO RIGHT TOTAL HIP ARTRHOPLASTY;  Surgeon: Seb Ortiz DO;  Location: [facility];  Service:      right wrist closed reduction           REVIEW OF SYSTEMS:  Denies any changes in her bowel or bladder habits.  No incontinence.  Susan notes that she uses a cane for walking out in public.  She has been using this for approximately 3 years for balance issues.  She does not use it at home.  A full 14 point review of systems was otherwise completed and is negative aside from that mentioned above in the HPI    MEDICATIONS:  Current Outpatient Prescriptions   Medication Sig Dispense Refill     acetaminophen (TYLENOL) 500 MG tablet Take 1,000 mg by mouth every 6 (six) hours as needed for pain.       alum/mag hydrox-simethicone-diphenhydramine-lidocaine (MAGIC MOUTHWASH) suspension Swish and spit 15 mL 4 (four) times a day as needed.       amLODIPine (NORVASC) 10 MG tablet Take 10 mg by mouth daily.       aspirin 81 mg chewable tablet Chew 81 mg daily.       azelastine (ASTELIN) 137 mcg (0.1 %) nasal spray 1 spray into each nostril 2 (two) times a day. Use in  each nostril as directed       calcium carbonate-vitamin D3 600 mg(1,500mg) -800 unit per tablet Take 1 tablet by mouth daily.       cholecalciferol, vitamin D3, 1,000 unit tablet Take 3,000 Units by mouth daily.       clopidogrel (PLAVIX) 75 mg tablet Take 75 mg by mouth daily.       esomeprazole magnesium (NEXIUM 24 HR) 22.3 mg capsule Take 22.3 mg by mouth every morning before breakfast.       ferrous sulfate 325 (65 FE) MG tablet Take 1 tablet by mouth twice a day with lunch and supper.        lisinopril (PRINIVIL,ZESTRIL) 20 MG tablet Take 20 mg by mouth daily.       mirtazapine (REMERON) 30 MG tablet Take 30 mg by mouth bedtime.       MULTIVITAMIN ORAL Take 1 tablet by mouth daily.        pravastatin (PRAVACHOL) 40 MG tablet Take 40 mg by mouth bedtime.       sertraline (ZOLOFT) 100 MG tablet Take 150 mg by mouth daily.        teriparatide (FORTEO) 20 mcg/dose - 600 mcg/2.4 mL injection Inject 0.08 mL (20 mcg total) under the skin daily. 2.4 mL 6     No current facility-administered medications for this visit.          ALLERGIES/SENSITIVITIES:     Allergies   Allergen Reactions     Lactose Other (See Comments)     diarrhea     Metoprolol Unknown     Bradycardia, heart rate in 30's      Percocet [Oxycodone-Acetaminophen] Other (See Comments)     hallucinations     Augmentin [Amoxicillin-Pot Clavulanate] Rash and Hives     Caused sores       PERTINENT SOCIAL HISTORY:   Social History     Social History     Marital status:      Spouse name: N/A     Number of children: N/A     Years of education: N/A     Social History Main Topics     Smoking status: Former Smoker     Packs/day: 0.50     Years: 15.00     Quit date: 4/27/2006     Smokeless tobacco: Never Used     Alcohol use Yes      Comment: rare     Drug use: No     Sexual activity: Not Asked     Other Topics Concern     None     Social History Narrative         FAMILY HISTORY:  Family History   Problem Relation Age of Onset     Cerebral aneurysm Sister   "       PHYSICAL EXAM:     Constitution: /72  Pulse 72  Resp 18  Ht 5' 2\" (1.575 m)  Wt 150 lb (68 kg)  BMI 27.44 kg/m2.   Awake, alert and in NAD  Eyes: Conjugate gaze. Conjunctiva benign without icterus or injection  Heart: RRR  Lungs: Non-labored respiration without accessory muscle use  Skin: No obvious rash or lesion  Psych: Appropriate mood and affect, alert and oriented x 3  Mental Status:  Speech is fluent.  Recent and remote memory are intact.  Attention span and concentration are normal.     Cranial Nerves:  CN2: No funduscopic exam performed. CN3,4 & 6: Pupillary light response, lateral and vertical gaze normal.  No nystagmus.   CN7: No facial weakness, smile, facial symmetry intact. CN8: Intact to spoken voice.      Motor: No pronator drift of upper extremity. Normal bulk and tone all muscle groups of upper and lower extremities. Strength is 5/5 in all muscle groups of the bilateral lower and upper extremities     Sensory: Sensation intact bilaterally to light touch and temperature throughout. Sensation is intact to pinprick in the bilateral LE.  Vibratory sense is intact the bilateral great toe.     Coordination: Patient's gait is mildly antalgic.  She is able to heel and toe walk.     Reflexes; 3+ bicep, tricep, brachioradialis.  2+ bilateral patellar and Achilles.  No clonus.  Toes are downgoing response to plantar stimulation.     Musculoskeletal: Negative straight leg raise bilaterally.  Negative Tk testing.     IMAGING: I personally reviewed all radiographic images    Lumbar spine x-ray 7/6/2018: Prior lumbar instrumentation with pedicle screws bilaterally at L3 and L4 and a left-sided screw at L5.  Evidence of lumbar spondylosis with mild retrolisthesis of L2 on L3 which is unchanged between flexion and extension.  At L5 there appears to be lucency surrounding the left screw.  There is a 5 mm anterolisthesis of L4 and L5 without evidence of overt dynamic instability.    Lumbar spine " x-ray 12/14/2017: There is 55  of lumbar lordosis.  Pelvic incidence measures approximately 57 .    CONSULTATION ASSESSMENT AND PLAN:    Susan Patrick is a 72-year-old female with a past medical history significant for many deficiency, parathyroid adenoma status post resection performed on 12/30/2016, osteoporosis currently on Forteo, refractory to prior treatment with Reclast, history of L2 pedicle fracture, T7 thoracic compression fracture, lumbar spondylosis, lumbar spondylolisthesis who has a history of prior L3-4, L4-5 laminectomy with posterior lateral fusion in July 2012 who has chronic low back pain, lumbar pseudoarthrosis and screw loosening at the level of L5.    Susan notes that she has chronic low back pain, but it does not affect her on a daily basis.  However, in light of her progressive worsening of her image with ongoing loosening of the L5 screw, I do suspect she will require revision of her fusion surgery.  We are going to work to obtain her images from [facility], so we can make definitive surgical plans.  She will need to continue on her Forteo and will need to follow-up with endocrinology for her osteoporosis-per the patient's report she will complete Forteo in August of this year. Ideally before surgery would have repeat DEXA scan to determine whether additional aggressive medical management needs to be pursued for osteoporosis.  We will also get a repeat vitamin D level in light of her history of vitamin D deficiency and her osteoporosis.    I spent more than 60 minutes in this apt, examining the pt, reviewing the scans, reviewing notes from chart, discussing treatment options with risks and benefits and coordinating care. >50 % clinic time was spent in face to face counseling and coordinating care    Kaila Miranda MD      Cc:   Loren Brito MD

## 2021-06-19 NOTE — PROGRESS NOTES
"Susan Patrick has undergone posterior lumbar fusion at L3-L5 in 2012 by Dr. Dumont.  Last seen in clinic by Dr. Gastelum on 6/13/2017.  Per her note: Patient eventually will need surgery for revision of her lumbar pseudoarthrosis unless the Forteo helps this to heal. Was placed in LSO as she was diagnosed with a T7 fracture after she fell last December.  In the interim, had a parathyroidectomy done by Dr. Snowden as well as Right THR done at [place].    Today she presents with a chief complaint of back pain and bilateral hip pain associated with standing and walking. States her legs feel \"weak\", uses a cane. Denies incontinence or saddle anesthesia.  DOMINGA score is 20%  Tania York RN, CNRN    "

## 2021-06-20 NOTE — PROGRESS NOTES
Susan Patrick was last sen on 7/7/2018 for her chronic mechanical back pain associated with L4-5 spondylolisthesis.  In the interim, she was seeing by endocrinology and has started on Fosamax (stopped taking Forteo).  Today she returns in follow up after DEXA scan for discussion of lumbar fusion at L4-5 as the next step of her treatment. She denies any change of symptoms in the interim. Uses a cane for safety. Denies bowel or bladder issues. Vit D level is 45.5.  DOMINGA score is 50%.  Tania York RN, CNRN

## 2021-06-20 NOTE — PROGRESS NOTES
Neurosurgery Progress Note  9/26/2018    A/P: Susan Patrick is a 72 y.o. female with a past surgical history significant for L3-4, L4-5 instrumented fusion who has evidence of L4-5 pseudoarthrosis.  She has also recently developed symptoms of lumbar radiculopathy, which I believe are related to her degenerative disc disease at L5-S1 which has associated severe foraminal stenosis    I noted that Susan's DEXA scan demonstrates persistent osteoporosis although with a good response compared to her scan performed in 2016.  Of note there is not been significant change in the bone density of the lumbar spine, although the T-scores are currently at -0.1 for L1-2, and this may be elevated secondary to some sclerosis and degenerative degenerative change.  She has completed her treatment with Forteo and she is now on Fosamax.  I will contact her endocrinology team to see if she is a candidate for any additional therapies in light of her need for repeat lumbar fusion.  We discussed options for surgical intervention and I recommended a revision of her L4-5 fusion with replacement of the left pedicle screw, placement of a new right pedicle screw as well as plans for extension to L5-S1.  I believe that she would benefit from a lumbar interbody fusion at L4-5 and L5-S1 as we will have more bone surface across which to promote fusion.  On her CAT scan she does have some significant disc height loss, particularly at L5-S1 along the left lateral vertebral body and if were not able to open up the space safely, we would proceed with posterolateral fusion.  We discussed the risks, benefits, alternatives to surgery and Susan wishes to proceed.  I would like to obtain a repeat MRI of her lumbar spine as we do not have one since 2016.  We will work to get imaging and surgery scheduled when next available.     S: Overall Susan's been doing relatively well.  She continues to have intermittent low back pain.  She does note the interval  development of left-sided lateral thigh pain which came on only over the last month or so.  She feels pain that radiates from the low back into the left leg which she was not experiencing in the past.  She denies any new numbness or tingling.  Her symptoms are aggravated with prolonged sitting or ambulation.  The leg pain is intermittent without obvious trigger.    PMH: For her osteoporosis she is being managed on Fosamax.  She continues on vitamin D supplementation  PSH: No interval change    ROS:  She denies any changes in her bowel or bladder habits.  A full 14 point review of systems was otherwise completed and is negative aside from that mentioned above in the HPI    O:  Vitals:    09/26/18 1552   BP: 128/78   Pulse: 76   Resp: 16     General: Awake, alert, NAD  HEENT: AT/NC, EOMI, face symmetric, oral mucosa moist and pink  Heart: RRR  Lungs: Nonlabored respirations without accessory muscle use.  Neuro: Awake, alert, speech is clear and content is appropriate. MAEW w full strength in b/l LE. Sensation is intact to temperature and LT. Vibratory sense is intact in the bilateral great toe.  Coordination: Gait is mildly antalgic, patient walks with mild forward flexed posture.  Reflexes: 2+ patellar and Achilles bilaterally.  No clonus. Toes downgoing bilaterally.  Musculoskeletal: Negative straight leg raise bilaterally.  Psych: Appropriate mood and affect, pleasant, cooperative, alert and oriented x 3  Skin: No new rash or lesion.    Lab: Vitamin D 45.5    I personally reviewed and visualized the following radiographic images:  Lumbar spine flexion extension x-rays 7/6/2018: 58 degrees of lumbar lordosis.  Pelvic incidence is 51 degrees    Kaila Miranda MD  ------------------------------------  ADDENDUM:   I was able to contact my [employer] representative regarding the patient's prior [employer] Legacy 5.5 instrumentation.  In light of the patient's osteoporosis, small pedicle size at L3 and L4, I would  like to keep her prior instrumentation in place at these levels.  We have discussed options for her instrumentation and I have submitted request for authorization for her new hardware that we will place at L5 and S1.    Kaila Miranda MD  10/04/18

## 2021-06-21 NOTE — ANESTHESIA PREPROCEDURE EVALUATION
Anesthesia Evaluation      Patient summary reviewed   No history of anesthetic complications     Airway   Mallampati: II  Neck ROM: full   Pulmonary - normal exam    breath sounds clear to auscultation  (-) not a smoker (Former)                         Cardiovascular - normal exam  Exercise tolerance: > or = 4 METS  (+) hypertension, CAD, , hypercholesterolemia, PVD (s/p femoral bypass)    (-) murmur  ECG reviewed  Rhythm: regular  Rate: normal,    no murmur      Neuro/Psych    (+) CVA , depression, anxiety/panic attacks,     Comments: Cerebral aneurysm s/p repair  Scheuermann's kyphosis  Lumbar DDD with severe foraminal stenosis      10/10/18 1150 MR Lumbar Spine Without Contrast View Image  Impression:   CONCLUSION:  1. Prior lower lumbar spinal fusion and posterior decompression.    2. At L5-S1, there is severe left-sided neural foraminal stenosis.    3. At L4-L5, there is moderate right-sided neural foraminal stenosis.    4. Overall, no significant change since the prior 3/31/2016 MRI.         Endo/Other    (+) arthritis,      Comments: Iron deficiency anemia    GI/Hepatic/Renal    (+) GERD,   chronic renal disease CRI,     Comments: Barahona's esophagus          Dental    (+) upper dentures                       Anesthesia Plan  Planned anesthetic: general endotracheal  Glidescope, neutral intubation    Ketamine  Precedex  1/2 mac Sevo  Methadone   Propofol   Decadron      Arterial line    Neuromonitoring  ASA 3   Induction: intravenous   Anesthetic plan and risks discussed with: patient  Anesthesia plan special considerations: video-assisted, antiemetics, arterial catheterization, IV therapy two IVs, dexmedetomidine  Post-op plan: routine recovery

## 2021-06-21 NOTE — PROGRESS NOTES
[facility]      Facility:    [facility] [[id number]]  Code Status: UNKNOWN      Chief Complaint/Reason for Visit:  Chief Complaint   Patient presents with     H & P     Status post lumbar fusion, osteoporosis, pain management, major depression, peripheral vascular disease, depression with anxiety.  Urinary retention.       HPI:   Susan is a 72 y.o. female who was recently admitted to the hospital secondary to osteoporosis and low back pain.  She has had previous L3-L4-L5 instrumented fusion and she then did develop lumbar radiculopathy.  It was likely felt to be degenerative joint disease of L5-S1 and found to have severe foraminal stenosis.  She was then brought in the hospital and underwent lumbar fusion.  She is got some depression with anxiety but this is stable at this time she underwent the procedure and she claims she did have a blood transfusion.  I do not have any evidence of this I do not have any hospital documentation of that at this time.  However since she is postsurgical we will definitely do a hemoglobin.  She does have moderate pain at this time and is not severe and she is only taken her hydromorphone a couple of times.  She is on scheduled Tylenol at this time and she is also on diazepam.  A Bledsoe catheter was placed in the hospital likely secondary urinary retention likely that will be moved on Monday.    She is moving her bowels without difficulty she does have a Bledsoe catheter in and she is her pain is moderate.  Incision is healing at this time and she denies any fevers chills nausea vomiting diarrhea.  Patient was treated appropriately in the hospital and she was transferred here to the TCU at [facility] in stable condition.    Past Medical History:  Past Medical History:   Diagnosis Date     Anemia     Iron deficiency     Anxiety      Arthritis      Barahona's esophagus      Cerebral aneurysm     right leg weakness residual     Chronic  kidney disease     CKD stage 3     Colon polyps     past hx.     Coronary artery disease      Depression      GERD (gastroesophageal reflux disease)      History of blood clots      History of transfusion      Hyperlipidemia      Hypertension      Lactose intolerance      Lumbar vertebral fracture (H)      Osteoporosis      Parathyroid adenoma      PVD (peripheral vascular disease) (H)      Right hip pain      Stroke (H)     cerebral infarction-aneurysm     Vitamin D deficiency      Xerostomia     dryness in mouth           Surgical History:  Past Surgical History:   Procedure Laterality Date     APPENDECTOMY       BACK SURGERY       CEREBRAL ANEURYSM REPAIR  2006    clipped     CEREBRAL ANEURYSM REPAIR       CERVICAL LAMINECTOMY      C6-7     CHOLECYSTECTOMY       EYE SURGERY Bilateral     cat     FEMORAL BYPASS  1995    left     FRACTURE SURGERY Right     hip pinning     HEMORRHOID SURGERY       HYSTERECTOMY      BSO     LUMBAR FUSION      L3-4, L4-5     PARATHYROIDECTOMY N/A 12/30/2016    Procedure: NECK EXPLORATION FOR PARATHYROID ;  Surgeon: Gilberto Snowden MD;  Location: [facility];  Service:      REVISION TOTAL HIP ARTHROPLASTY Right 10/6/2017    Procedure: REMOVAL OF TROCHANTERIC NAIL CONVERSION TO RIGHT TOTAL HIP ARTRHOPLASTY;  Surgeon: Seb Ortiz DO;  Location: [facility];  Service:      right wrist closed reduction         Family History:   Family History   Problem Relation Age of Onset     Cerebral aneurysm Sister        Social History:    Social History     Socioeconomic History     Marital status:      Spouse name: None     Number of children: None     Years of education: None     Highest education level: None   Social Needs     Financial resource strain: None     Food insecurity - worry: None     Food insecurity - inability: None     Transportation needs - medical: None     Transportation needs - non-medical: None   Occupational History     None   Tobacco Use     Smoking  status: Former Smoker     Packs/day: 0.50     Years: 15.00     Pack years: 7.50     Last attempt to quit: 2006     Years since quittin.8     Smokeless tobacco: Never Used   Substance and Sexual Activity     Alcohol use: Yes     Comment: rare     Drug use: No     Sexual activity: None   Other Topics Concern     None   Social History Narrative     None          Review of Systems   Constitutional:        Patient is currently in moderate pain.  She cannot she seems to be comfortable at this time and laying on her back.  She denies any fevers chills nausea vomiting diarrhea change in vision hearing taste or smell weakness one side the other.  She does a Bledsoe catheter in the urinary bag looks clean and she denies any other issues.  There is no depression or anxiety at this time.       Vitals:    18 0849   BP: 151/71   Pulse: 74   Resp: 16   Temp: 98.3  F (36.8  C)   SpO2: 92%       Physical Exam   Constitutional: She appears well-nourished. No distress.   HENT:   Head: Normocephalic and atraumatic.   Nose: Nose normal.   Mucosa had a white coat is discharge consistent with oral thrush.   Eyes: Right eye exhibits no discharge. Left eye exhibits no discharge. No scleral icterus.   Neck: Neck supple. No thyromegaly present.   Cardiovascular: Normal rate and regular rhythm.   No murmur heard.  Pulmonary/Chest: Effort normal and breath sounds normal. No respiratory distress. She has no wheezes.   Abdominal: Soft. Bowel sounds are normal. She exhibits no distension. There is no tenderness.   Musculoskeletal: She exhibits no edema.   Neurological: She is alert. No cranial nerve deficit. She exhibits normal muscle tone.   Skin: She is not diaphoretic.   Psychiatric: She has a normal mood and affect.       Medication List:  Current Outpatient Medications   Medication Sig     acetaminophen (TYLENOL) 500 MG tablet Take 1 tablet (500 mg total) by mouth every 4 (four) hours for 5 days.     alendronate (FOSAMAX) 70 MG  tablet Take 1 tablet (70 mg total) by mouth every 7 days. Take in the morning on an empty stomach with a full glass of water 30 minutes before food     amLODIPine (NORVASC) 10 MG tablet Take 10 mg by mouth daily.     aspirin 81 mg chewable tablet Chew 81 mg daily.     calcium carbonate-vitamin D3 600 mg(1,500mg) -800 unit per tablet Take 1 tablet by mouth daily.     cholecalciferol, vitamin D3, 1,000 unit tablet Take 3,000 Units by mouth daily.     clopidogrel (PLAVIX) 75 mg tablet Take 75 mg by mouth daily.     diazePAM (VALIUM) 5 MG tablet Take 0.5-1 tablets (2.5-5 mg total) by mouth every 6 (six) hours as needed (Muscle spasms.).     esomeprazole magnesium (NEXIUM 24 HR) 22.3 mg capsule Take 22.3 mg by mouth every morning before breakfast.     ferrous sulfate 325 (65 FE) MG tablet Take 1 tablet by mouth twice a day with lunch and supper.      gabapentin (NEURONTIN) 100 MG capsule Take 100 mg by mouth 3 (three) times a day.     hydroCHLOROthiazide (HYDRODIURIL) 25 MG tablet Take 25 mg by mouth daily.     HYDROmorphone (DILAUDID) 2 MG tablet Take 1 tablet (2 mg total) by mouth every 4 (four) hours as needed.     lisinopril (PRINIVIL,ZESTRIL) 20 MG tablet Take 20 mg by mouth daily.     mirtazapine (REMERON) 30 MG tablet Take 30 mg by mouth bedtime.     MULTIVITAMIN ORAL Take 1 tablet by mouth daily.      pravastatin (PRAVACHOL) 40 MG tablet Take 40 mg by mouth bedtime.     sertraline (ZOLOFT) 100 MG tablet Take 150 mg by mouth daily.        Labs: As per the labs are as follows; magnesium was 2.1, potassium 3.7, d-dimer was 2.38, hemoglobin was 8.7, white count was 9.6, platelets are 230,000, basic metabolic profile was within normal limits.      Assessment:    ICD-10-CM    1. Status post lumbar spinal fusion Z98.1    2. Pain management R52    3. Urinary retention R33.9    4. Essential hypertension I10    5. Encounter for screening for vascular disease Z13.6    6. Depression with anxiety F41.8        Plan: Plan at  this time hemoglobin stat today secondary anemia nystatin swish and spit 5 mL 3 times daily times 5 days.  Pain assessments will be done by staff every 4 hours around the clock and then to wake patient for pain assessment.  We will continue medications as ordered at this time and no other changes to care plan at this time.  Will observe will examine the incision on wound rounds today.        Electronically signed by: Corbin Brown DO

## 2021-06-21 NOTE — PROGRESS NOTES
Code Status:  FULL CODE  Visit Type: Problem Visit     Facility:  [facility] [[id number]]         Facility Type: SNF (Skilled Nursing Facility, TCU)    History of Present Illness: Susan Patrick is a 72 y.o. female who I am seeing today for follow-up on the TCU.  Patient with past medical history that includes peripheral vascular disease, CVA, coronary artery disease, depression, Barahona's esophagus, arthritis, anemia, GERD, hypertension, parathyroid adenoma and osteoporosis.  Patient with recent bilateral stress fractures in the L2 pedicles and pseudoarthrosis at L3-L4 and L4-5.  She has a history of lumbar fusion done in July 2012.  She was being treated out patiently with conservative measures however lucency surrounding her L5 screws progressed.  Patient status post L4-5 fusion and extension of the fusion to involve L5-S1 with hardware removal on 11/19/18.  Patient had postop urinary retention.  Continues with Bledsoe catheter.  Will attempt to remove this.  Patient complaining of lumbar pain with radiculopathy down into the left buttock and thigh.  She continues on Tylenol, Valium as needed, gabapentin and Dilaudid.  She tells me today that Dilaudid is not really working that she is taking the Valium which is somewhat helpful.  Chronic anemia.  Postoperative hemoglobin 9.1.    Today pt sitting up in bedside chair. She continues in TLSO. She continues with radiculopathy in the RLE. She continues on tylenol, Diluadid and occasional valium. She is on gabapentin 3 times daily. She is ambulating with rolling walker. Her bowels are moving regularly.       Active Ambulatory Problems     Diagnosis Date Noted     Pseudoarthrosis of lumbar spine 04/05/2016     Low back pain 04/05/2016     Fracture of lumbar vertebra (H) 04/05/2016     Osteoporosis 04/05/2016     Vitamin D deficiency 04/26/2016     Scheuermann's kyphosis 07/19/2016     Non-traumatic compression fracture of T7 thoracic vertebra (H)  11/14/2016     Cigarette nicotine dependence in remission 11/14/2016     Acute pain of right hip 11/14/2016     Kyphosis of thoracic region 11/14/2016     Parathyroid adenoma 12/06/2016     Hypertension, goal below 140/80 12/30/2016     PVD (peripheral vascular disease) (H) 12/30/2016     CVA (cerebral vascular accident) (H) 12/30/2016     Barahona esophagus 12/30/2016     Major depression, recurrent (H) 12/30/2016     CKD (chronic kidney disease) stage 3, GFR 30-59 ml/min (H) 12/30/2016     Arthropathy of right hip 06/13/2017     Status post total hip replacement, right 10/06/2017     Cerebral infarction (H) 03/07/2012     Depression with anxiety 10/18/2011     Iron deficiency anemia 02/22/2016     Lactose intolerance 07/23/2013     Pain medication agreement 03/13/2015     History of colonic polyps 02/09/2012     S/P lumbar spinal fusion 12/05/2012     Lumbar radiculopathy 11/09/2018     Lumbar pseudoarthrosis      Postoperative anemia      Resolved Ambulatory Problems     Diagnosis Date Noted     Flatback syndrome of lumbar region 04/05/2016     Past Medical History:   Diagnosis Date     Anemia      Anxiety      Arthritis      Barahona's esophagus      Cerebral aneurysm      Chronic kidney disease      Colon polyps      Coronary artery disease      Depression      GERD (gastroesophageal reflux disease)      History of blood clots      History of transfusion      Hyperlipidemia      Hypertension      Lactose intolerance      Lumbar vertebral fracture (H)      Osteoporosis      Parathyroid adenoma      PVD (peripheral vascular disease) (H)      Right hip pain      Stroke (H)      Vitamin D deficiency      Xerostomia        Current Outpatient Medications   Medication Sig Note     alendronate (FOSAMAX) 70 MG tablet Take 1 tablet (70 mg total) by mouth every 7 days. Take in the morning on an empty stomach with a full glass of water 30 minutes before food 11/9/2018: Takes every monday     amLODIPine (NORVASC) 10 MG tablet  Take 10 mg by mouth daily.      aspirin 81 mg chewable tablet Chew 81 mg daily.      calcium carbonate-vitamin D3 600 mg(1,500mg) -800 unit per tablet Take 1 tablet by mouth daily.      cholecalciferol, vitamin D3, 1,000 unit tablet Take 3,000 Units by mouth daily.      clopidogrel (PLAVIX) 75 mg tablet Take 75 mg by mouth daily.      diazePAM (VALIUM) 5 MG tablet Take 0.5-1 tablets (2.5-5 mg total) by mouth every 6 (six) hours as needed (Muscle spasms.).      esomeprazole magnesium (NEXIUM 24 HR) 22.3 mg capsule Take 22.3 mg by mouth every morning before breakfast.      ferrous sulfate 325 (65 FE) MG tablet Take 1 tablet by mouth twice a day with lunch and supper.       gabapentin (NEURONTIN) 100 MG capsule Take 100 mg by mouth 3 (three) times a day.      hydroCHLOROthiazide (HYDRODIURIL) 25 MG tablet Take 25 mg by mouth daily.      HYDROmorphone (DILAUDID) 2 MG tablet Take 1 tablet (2 mg total) by mouth every 4 (four) hours as needed.      lisinopril (PRINIVIL,ZESTRIL) 20 MG tablet Take 20 mg by mouth daily.      mirtazapine (REMERON) 30 MG tablet Take 30 mg by mouth bedtime.      MULTIVITAMIN ORAL Take 1 tablet by mouth daily.       pravastatin (PRAVACHOL) 40 MG tablet Take 40 mg by mouth bedtime.      sertraline (ZOLOFT) 100 MG tablet Take 150 mg by mouth daily.         Allergies   Allergen Reactions     Lactose Other (See Comments)     Diarrhea; Lactose intolerant     Metoprolol Unknown     Bradycardia, heart rate in 30's      Percocet [Oxycodone-Acetaminophen] Other (See Comments)     hallucinations     Augmentin [Amoxicillin-Pot Clavulanate] Rash and Hives     Caused sores         Review of Systems   No fevers or chills. No headache, lightheadedness or dizziness. No SOB, chest pains or palpitations. Appetite is good. No nausea, vomiting, constipation or diarrhea. No dysuria, frequency, burning or pain with urination. Pt reports pain in her left lower back radiating to her left buttock and down her left leg.         Physical Exam   PHYSICAL EXAMINATION:  Vital signs: /71, P 72, R 14, T 96.7.  Current weight 141.2 pounds.  General: Awake, Alert, oriented x3, appropriately, follows simple commands, conversant  HEENT:PERRLA, Pink conjunctiva, anicteric sclerae, moist oral mucosa  NECK: Supple, without any lymphadenopathy, or masses  CVS:  S1  S2, without murmur or gallop.   LUNG: Clear to auscultation, No wheezes, rales or rhonci.  BACK: No kyphosis of the thoracic spine. TLSO splinting in place. Tenderness in the left sciatic region.   ABDOMEN: Soft, nontender to palpation, with positive bowel sounds  EXTREMITIES: Good range of motion on both upper and lower extremities, no pedal edema, no calf tenderness  SKIN: Warm and dry, no rashes or erythema noted  NEUROLOGIC: Intact, pulses palpable  PSYCHIATRIC: Cognition intact            Labs:  All labs reviewed in the nursing home record.    Assessment/Plan:  1. Status post lumbar spinal fusion     2. Pain management     3. Radiculopathy of lumbar region     4. Essential hypertension           Patient s/p lumbar fusion with hardware removal.  She continues with radiculopathy into the left lower extremity.  Currently on gabapentin 100 mg 3 times daily. I recently increased the HS dose to 200 mg. I will increase to 300mg at HS. Continue 100mg in the am and mid day. She also continues on Valium as well as Dilaudid.  PT to eval for pain modalities including heat, massage and possible US. She continues in TLSO splinting.  Hypertension satisfactory controlled.  Chronic kidney disease stable.  Postoperative urinary retention. Bledsoe d/cd pt voiding without difficulty. Depression with anxiety stable.  Chronic iron deficiency anemia.  She is on iron supplement..  Postoperative hemoglobin 9.1.      Electronically signed by: Myai Clifford, CNP

## 2021-06-21 NOTE — ANESTHESIA PROCEDURE NOTES
Arterial Line  Reason for Procedure: hemodynamic monitoring  Patient location during procedure: Pre-op  Start time: 11/9/2018 7:24 AM  End time: 11/9/2018 7:32 AM  Staffing:  Performing  Anesthesiologist: JOSH HEATH  Sterile Precautions:  sterile barriers used during insertion: cap, mask, sterile gloves, large sheet, and hand hygiene used.  Arterial Line:   Immediately prior to procedure a time out was called to verify the correct patient, procedure, equipment, support staff and site/side marked as required  Laterality: right  Location: radial  Prepped with: ChloroPrep    Needle gauge: 20 G  Number of Attempts: 2  Secured with: tape, transparent dressing and pressure dressing  Flushed with: saline  1% lidocaine local anesthesia used for skin prep.   See MAR for additional medications given.  Ultrasound evaluation of access site: yes  Vessel patent by US exam    Concurrent real time visualization of needle entry

## 2021-06-21 NOTE — PROGRESS NOTES
Preop Assessment: Susan Patrick presents for pre-op review.  Surgeon: Dr. Miranda  Name of Surgery: Revision of L4-5 posterolateral fusion  Diagnosis: spondylolisthesis  Date of Surgery: 11/9/2018  Time of Surgery: 0730  Hospital: [facility]  H&P: by Dr. Brito on 10/31/2018 - cleared for surgery  History of ASA, NSAIDS, vitamin and/or herbal supplements within 10 days: Yes - stopped ASA  History of blood thinners: Yes - stopped Plavix on 11/2/2018  History of anti-seizure med's: No  Review of systems: unchanged    Diagnostics:  Labs: WNL  CXR: n/a  EKG: WNL  Other:   Films: MRI from 10/10/2018 - in [facility]      Last BM - 11/6/2018  Nausea or Vomiting - denies  Urinary retention - denies  Pain management - no Red Flags  Home PT Evaluation: ambulates independently, steady gait and stride, no imbalance noted  - no indication for Home PT pre-op  Patient confirmed they have help/assistance in place at home upon discharge      All questions answered regarding surgery and expected pre and postoperative course including rehabilitation phase.     Reviewed with patient: Arrive 2.5 -3 hours prior to scheduled surgery, nothing to eat or drink after midnight the night before surgery and bring all pertinent films to the hospital the day of surgery.  Continue to refrain from NSAIDS (Ibuprofen, Aleve, Naprosyn) ASA or over the counter herbal medication or supplements, anticoagulants and blood thinners.    Preop skin preparations and instructions provided.    Incentive Spirometer usage instructions provided.    Patient confirmed they have help/assistance in place at home upon discharge.    Patient was informed that we will provide up to 1 week prescription of pain medication for post-operative pain.    Surgical consent was singed.    Tania York, RN, CNRN

## 2021-06-21 NOTE — ANESTHESIA CARE TRANSFER NOTE
Last vitals:   Vitals:    11/09/18 2006   BP: (!) 141/91   Pulse: 64   Resp: 16   Temp: 36.9  C (98.5  F)   SpO2: 100%     Patient's level of consciousness is drowsy  Spontaneous respirations: yes  Maintains airway independently: yes  Dentition unchanged: yes  Oropharynx: oropharynx clear of all foreign objects    QCDR Measures:  ASA# 20 - Surgical Safety Checklist: WHO surgical safety checklist completed prior to induction  PQRS# 430 - Adult PONV Prevention: 4558F - Pt received => 2 anti-emetic agents (different classes) preop & intraop  ASA# 8 - Peds PONV Prevention: NA - Not pediatric patient, not GA or 2 or more risk factors NOT present  PQRS# 424 - Veronica-op Temp Management: 4559F - At least one body temp DOCUMENTED => 35.5C or 95.9F within required timeframe  PQRS# 426 - PACU Transfer Protocol: - Transfer of care checklist used  ASA# 14 - Acute Post-op Pain: ASA14B - Patient did NOT experience pain >= 7 out of 10

## 2021-06-21 NOTE — ANESTHESIA POSTPROCEDURE EVALUATION
Patient: Susan Patrick  REVISION OF LUMBAR FOUR - LUMBAR FIVE POSTEROLATERAL FUSION WITH REMOVAL OF HARDWARE, LUMBAR FOUR-LUMBAR FIVE AND LUMBAR FIVE - SACRAL ONE LUMBAR INTERBODY FUSIONS WITH INSTRUMENTATION, USE OF ALLOGRAFT, AUTOGRAFT, STEALTH NAVIGATION, NEUROMONITORING  Anesthesia type: general    Patient location: PACU  Last vitals:   Vitals:    11/09/18 2212   BP: 119/55   Pulse: 61   Resp: 16   Temp: 37  C (98.6  F)   SpO2:      Post vital signs: stable  Level of consciousness: responds to simple questions.  Awake but sleepy after pain medication.  Post-anesthesia pain: pain controlled  Post-anesthesia nausea and vomiting: no  Pulmonary: unassisted, face mask  Cardiovascular: stable and blood pressure at baseline  Hydration: adequate  Anesthetic events: no    QCDR Measures:  ASA# 11 - Veronica-op Cardiac Arrest: ASA11B - Patient did NOT experience unanticipated cardiac arrest  ASA# 12 - Veronica-op Mortality Rate: ASA12B - Patient did NOT die  ASA# 13 - PACU Re-Intubation Rate: ASA13B - Patient did NOT require a new airway mgmt  ASA# 10 - Composite Anes Safety: ASA10A - No serious adverse event    Additional Notes:  HgB 7, transfusing PRBC

## 2021-06-21 NOTE — PROGRESS NOTES
"S: Patient is a 73 y/o female, 5'2\", 152 lbs seen at [facility], room 5022, for the fitting and delicery of her custom TLSO for the treatment of Dx: Lumbar radiculopathy [M54.16], Lumbar pseudoarthrosis [S32.009K], and Spondylolisthesis of lumbar region [M43.16] on orders from Sabra Barajas CNP.    O: Patient presented sitting upright in her hospital bed and was sleeping at the time of my arrival.     G: Support and stabilize the patient's spin during her post-operative healing period by limiting triplanar movement and providing proper spinal alignment through soft tissue compression.     A:. I was able to log-roll the patient for the fitting of her custom TLSO with the aid of hospital staff. After donning was complete, fit of TLSO was optimal. Patient was in and out of sleep during our visit this morning and could not concentrate very well. Patient and I did discuss care and use of her custom TLSO during our appointment today, but I am unsure of how much she will remember. I did leave the patient with an instruction sheet for proper donning and use of her TLSO that she can refer to later. Patient did not have further questions at the end of our appointment.     P: Patient was left with my card and asked to call our office if there are any questions or concerns regarding the fit/function of her custom TLSO in the future.     Jun Farnsworth, Board Eligible     Fitting a [place] TLSO  1. Patient should be supine. Palpate for waist (below ribs but above hips) so you know where to line up waist grooves of the brace.   2. Logroll patient and slide back section of brace under patient lining up waist groove of brace with patient's waist.   3. Roll patient onto their back with the posterior section behind them. Recheck alignment of waist groove on brace with patient's waist. It may be necessary to logroll patient to the opposite side to get posterior section centered on patient, where it extends anteriorly " "equal amounts on patient's sides. Repeat logrolling side to side as necessary.   4. Once posterior section positioned correctly, lay anterior section on patient. Again, line up waist groove of brace to patient's waist. Waist grooves of anterior and posterior section should match up and fit together. Anterior shell should go over the top of posterior shell. Velcro straps should line up with the chafes/D-rings, if they don't, either the anterior or the posterior sections are not in the proper place.   5. Fasten the middle straps first and tighten both sides evenly. Then fasten either top or bottom straps in the same manner. Brace should be snug so as to prevent brace from sliding up on patient. If it is too loose, the brace will slide up and cause discomfort to the patient in the chin and/or axilla area.  6. When properly fit, brace should be about 1\" inferior to the manubrium and about 1.5-2\" inferior to the axilla. The inferior aspect should allow clearance so as not to cut into the tops of the thighs when sitting, but needs to be as low in the pelvic area as possible.     For optimal fit brace should NOT be donned with patient sitting. Ideal fit is achieved by donning in either laying or standing position. Due to changes in belly tissue, it is difficult to achieve a proper fit when patient is sitting.     Brace migration is inevitable, especially when patient is going from laying to upright in bed. Bed will push posterior section of brace up and will push the whole brace up. Migration will also occur when patient is sitting in a hard chair. Don't be afraid to readjust brace and pull it down and back to its proper position  "

## 2021-06-21 NOTE — PROGRESS NOTES
S: Pt seen at [facility] room 5022 bedside for eval for a custom TLSO. Pt presents in a seated postion in her hospital chair. Her nurse assisted in transferring to the bed and she was able to lay supine for measurements.   Dx: Lumbar radiculopathy [M54.16], Lumbar pseudoarthrosis [S32.009K], and Spondylolisthesis of lumbar region [M43.16].  Custom TLSO ordered by Sabra Barajas, MOHS[facility].  I took measurements for a custom [employer] TLSO and it will be ready to be fit tomorrow morning/ early afternoon.    O/G: Support, stabilize, and facilitate the healing post-op  A:. Measurements were taken of patients abdomen while side lying and were completed without incident. Measurements were sent to [employer] for fabrication. Patient requires a custom TLSO due to multiple levels of involvement and body proportions.   P: Pt will be fit tomorrow morning/ early afternoon         Fitting a [place] TLSO  1. Patient should be supine. Palpate for waist (below ribs but above hips) so you know where to line up waist grooves of the brace.   2. Logroll patient and slide back section of brace under patient lining up waist groove of brace with patient's waist.   3. Roll patient onto their back with the posterior section behind them. Recheck alignment of waist groove on brace with patient's waist. It may be necessary to logroll patient to the opposite side to get posterior section centered on patient, where it extends anteriorly equal amounts on patient's sides. Repeat logrolling side to side as necessary.   4. Once posterior section positioned correctly, lay anterior section on patient. Again, line up waist groove of brace to patient's waist. Waist grooves of anterior and posterior section should match up and fit together. Anterior shell should go over the top of posterior shell. Velcro straps should line up with the chafes/D-rings, if they don't, either the anterior or the posterior sections are not in the proper place.   5.  "Fasten the middle straps first and tighten both sides evenly. Then fasten either top or bottom straps in the same manner. Brace should be snug so as to prevent brace from sliding up on patient. If it is too loose, the brace will slide up and cause discomfort to the patient in the chin and/or axilla area.  6. When properly fit, brace should be about 1\" inferior to the manubrium and about 1.5-2\" inferior to the axilla. The inferior aspect should allow clearance so as not to cut into the tops of the thighs when sitting, but needs to be as low in the pelvic area as possible.     For optimal fit brace should NOT be donned with patient sitting. Ideal fit is achieved by donning in either laying or standing position. Due to changes in belly tissue, it is difficult to achieve a proper fit when patient is sitting.     Brace migration is inevitable, especially when patient is going from laying to upright in bed. Bed will push posterior section of brace up and will push the whole brace up. Migration will also occur when patient is sitting in a hard chair. Don't be afraid to readjust brace and pull it down and back to its proper position.  "

## 2021-06-21 NOTE — PROGRESS NOTES
Code Status:  FULL CODE  Visit Type: Problem Visit     Facility:  [facility] [[id number]]         Facility Type: SNF (Skilled Nursing Facility, TCU)    History of Present Illness: Susan Patrick is a 72 y.o. female who I am seeing today for follow-up on the TCU.  Patient with past medical history that includes peripheral vascular disease, CVA, coronary artery disease, depression, Barahona's esophagus, arthritis, anemia, GERD, hypertension, parathyroid adenoma and osteoporosis.  Patient with recent bilateral stress fractures in the L2 pedicles and pseudoarthrosis at L3-L4 and L4-5.  She has a history of lumbar fusion done in July 2012.  She was being treated out patiently with conservative measures however lucency surrounding her L5 screws progressed.  Patient status post L4-5 fusion and extension of the fusion to involve L5-S1 with hardware removal on 11/19/18.  Patient had postop urinary retention.  Continues with Bledsoe catheter.  Will attempt to remove this.  Patient complaining of lumbar pain with radiculopathy down into the left buttock and thigh.  She continues on Tylenol, Valium as needed, gabapentin and Dilaudid.  She tells me today that Dilaudid is not really working that she is taking the Valium which is somewhat helpful.  Chronic anemia.  Postoperative hemoglobin 9.1.      Active Ambulatory Problems     Diagnosis Date Noted     Pseudoarthrosis of lumbar spine 04/05/2016     Low back pain 04/05/2016     Fracture of lumbar vertebra (H) 04/05/2016     Osteoporosis 04/05/2016     Vitamin D deficiency 04/26/2016     Scheuermann's kyphosis 07/19/2016     Non-traumatic compression fracture of T7 thoracic vertebra (H) 11/14/2016     Cigarette nicotine dependence in remission 11/14/2016     Acute pain of right hip 11/14/2016     Kyphosis of thoracic region 11/14/2016     Parathyroid adenoma 12/06/2016     Hypertension, goal below 140/80 12/30/2016     PVD (peripheral vascular disease) (H) 12/30/2016      CVA (cerebral vascular accident) (H) 12/30/2016     Barahona esophagus 12/30/2016     Major depression, recurrent (H) 12/30/2016     CKD (chronic kidney disease) stage 3, GFR 30-59 ml/min (H) 12/30/2016     Arthropathy of right hip 06/13/2017     Status post total hip replacement, right 10/06/2017     Cerebral infarction (H) 03/07/2012     Depression with anxiety 10/18/2011     Iron deficiency anemia 02/22/2016     Lactose intolerance 07/23/2013     Pain medication agreement 03/13/2015     History of colonic polyps 02/09/2012     S/P lumbar spinal fusion 12/05/2012     Lumbar radiculopathy 11/09/2018     Lumbar pseudoarthrosis      Postoperative anemia      Resolved Ambulatory Problems     Diagnosis Date Noted     Flatback syndrome of lumbar region 04/05/2016     Past Medical History:   Diagnosis Date     Anemia      Anxiety      Arthritis      Barahona's esophagus      Cerebral aneurysm      Chronic kidney disease      Colon polyps      Coronary artery disease      Depression      GERD (gastroesophageal reflux disease)      History of blood clots      History of transfusion      Hyperlipidemia      Hypertension      Lactose intolerance      Lumbar vertebral fracture (H)      Osteoporosis      Parathyroid adenoma      PVD (peripheral vascular disease) (H)      Right hip pain      Stroke (H)      Vitamin D deficiency      Xerostomia        Current Outpatient Medications   Medication Sig Note     acetaminophen (TYLENOL) 500 MG tablet Take 1 tablet (500 mg total) by mouth every 4 (four) hours for 5 days.      alendronate (FOSAMAX) 70 MG tablet Take 1 tablet (70 mg total) by mouth every 7 days. Take in the morning on an empty stomach with a full glass of water 30 minutes before food 11/9/2018: Takes every monday     amLODIPine (NORVASC) 10 MG tablet Take 10 mg by mouth daily.      aspirin 81 mg chewable tablet Chew 81 mg daily.      calcium carbonate-vitamin D3 600 mg(1,500mg) -800 unit per tablet Take 1 tablet by  mouth daily.      cholecalciferol, vitamin D3, 1,000 unit tablet Take 3,000 Units by mouth daily.      clopidogrel (PLAVIX) 75 mg tablet Take 75 mg by mouth daily.      diazePAM (VALIUM) 5 MG tablet Take 0.5-1 tablets (2.5-5 mg total) by mouth every 6 (six) hours as needed (Muscle spasms.).      esomeprazole magnesium (NEXIUM 24 HR) 22.3 mg capsule Take 22.3 mg by mouth every morning before breakfast.      ferrous sulfate 325 (65 FE) MG tablet Take 1 tablet by mouth twice a day with lunch and supper.       gabapentin (NEURONTIN) 100 MG capsule Take 100 mg by mouth 3 (three) times a day.      hydroCHLOROthiazide (HYDRODIURIL) 25 MG tablet Take 25 mg by mouth daily.      HYDROmorphone (DILAUDID) 2 MG tablet Take 1 tablet (2 mg total) by mouth every 4 (four) hours as needed.      lisinopril (PRINIVIL,ZESTRIL) 20 MG tablet Take 20 mg by mouth daily.      mirtazapine (REMERON) 30 MG tablet Take 30 mg by mouth bedtime.      MULTIVITAMIN ORAL Take 1 tablet by mouth daily.       pravastatin (PRAVACHOL) 40 MG tablet Take 40 mg by mouth bedtime.      sertraline (ZOLOFT) 100 MG tablet Take 150 mg by mouth daily.         Allergies   Allergen Reactions     Lactose Other (See Comments)     Diarrhea; Lactose intolerant     Metoprolol Unknown     Bradycardia, heart rate in 30's      Percocet [Oxycodone-Acetaminophen] Other (See Comments)     hallucinations     Augmentin [Amoxicillin-Pot Clavulanate] Rash and Hives     Caused sores         Review of Systems   No fevers or chills. No headache, lightheadedness or dizziness. No SOB, chest pains or palpitations. Appetite is good. No nausea, vomiting, constipation or diarrhea. No dysuria, frequency, burning or pain with urination. Pt reports pain in her left lower back radiating to her left buttock and down her left leg.        Physical Exam   PHYSICAL EXAMINATION:  Vital signs: 154/75, heart rate 69, respirations 16, temperature 97, O2 sat 95% on room air.  Current weight 141.2  pounds.  General: Awake, Alert, oriented x3, appropriately, follows simple commands, conversant  HEENT:PERRLA, Pink conjunctiva, anicteric sclerae, moist oral mucosa  NECK: Supple, without any lymphadenopathy, or masses  CVS:  S1  S2, without murmur or gallop.   LUNG: Clear to auscultation, No wheezes, rales or rhonci.  BACK: No kyphosis of the thoracic spine. TLSO splinting in place. Tenderness in the left sciatic region.   ABDOMEN: Soft, nontender to palpation, with positive bowel sounds  EXTREMITIES: Good range of motion on both upper and lower extremities, no pedal edema, no calf tenderness  SKIN: Warm and dry, no rashes or erythema noted  NEUROLOGIC: Intact, pulses palpable  PSYCHIATRIC: Cognition intact            Labs:  All labs reviewed in the nursing home record.    Assessment/Plan:  1. Status post lumbar spinal fusion     2. Pain management     3. Radiculopathy of lumbar region     4. Urinary retention     5. Essential hypertension     6. Depression with anxiety     7. Chronic kidney disease, stage III (moderate) (H)       Patient s/p lumbar fusion with hardware removal.  She continues with radiculopathy into the left lower extremity.  Currently on gabapentin 100 mg 3 times daily.  Will increase to 100 twice daily and 200 at at bedtime.  She also has Valium as well as Dilaudid.  She continues in TLSO splinting.  Hypertension satisfactory controlled.  Chronic kidney disease stable.  Postoperative urinary retention.  She continues with Bledsoe catheter.  Will attempt to DC.  Will need to check postvoid residuals.  Fluids encouraged.  Depression with anxiety stable.  Chronic iron deficiency anemia.  She is on iron supplement..  Postoperative hemoglobin 9.1.        45 minutes spent of which greater than 50% was face to face communication with the patient about above plan of care    Electronically signed by: Mayi Clifford, CNP

## 2021-06-21 NOTE — LETTER
Letter by Corbin Brown DO at      Author: Corbin Brown DO Service: -- Author Type: --    Filed:  Encounter Date: 4/30/2021 Status: (Other)         Patient: Susan Patrick   MR Number: [id number]   YOB: 1945   Date of Visit: 4/30/2021     [facility]      Facility:    [facility] [[id number]]  Code Status: FULL CODE      Chief Complaint/Reason for Visit:  Chief Complaint   Patient presents with   ? H & P     Elective right total knee arthroplasty, history of peripheral vascular disease, essential hypertension, cerebral infarction in the past, iron deficiency anemia, coronary disease, stage IIIa chronic kidney disease.  Pain management.       HPI:   Susan is a 75 y.o. female who only admitted to the hospital on 4/26/2021 for elective right total knee arthroplasty.  This was done secondary degenerative joint disease it was refractory to conservative management.  She underwent the procedure without any perioperative postoperative complications and she was followed by the hospital service.  There were no postoperative complications and hemoglobin was stable on the day of discharge.  Patient was treated appropriately and transferred here to the TCU at [facility] in stable condition.    Patient was discharged on the following medications one hydrocodone/acetaminophen 5/325 1-2 tabs every 4 hours as needed for pain she is also on acetaminophen 500 mg 1000 mg every 6 hours.  That would good give her 4 g maximum dose of acetaminophen in a 24-hour.  Add the Norco to this and she would be well over the maximum.    Patient is lying in bed comfortably does not appear to be in acute distress she claims her pain is under adequate control when she is at rest but when she is ambulating with physical therapy she does have some pain.  I am unsure of the consistency of her acetaminophen and hydrocodone however given the fact that she is on scheduled  acetaminophen will likely just go with the hydrocodone/acetaminophen she claims that is working for the pain.  She is moving her bowels at this time and wants to hold off on the senna and she denies any other issues at this time.    Past Medical History:  Past Medical History:   Diagnosis Date   ? Acute cystitis without hematuria    ? Acute pain of right hip 11/14/2016    Formatting of this note might be different from the original. December 2016, Seen at [facility] by neurosurgery, pain confirmed to be coming from her hip after an injection, referred to orthopedist. 6/2017 note from the spine specialist. Dr. Saskia Gastelum. Now seeing Dr. Ortiz at [facility] for hip pain. may need replacement.   ? Aftercare following right knee joint replacement surgery 12/18/2012    Formatting of this note might be different from the original. Patient has identified Health Care Agent(s): Yes Add Health Care Agents: Yes   Health Care Agent(s): Primary Health Care Agent: Charlene Ramirez Relationship: daughter Phone: [phone]  Secondary Health Care Agent: Ian Patrick  Relationship: son Phone:[phone]    Patient has Advance Care Plan Documents (Health Care Directive, POLS   ? LADARIUS (acute kidney injury) (H)    ? Anemia     Iron deficiency   ? Anxiety    ? Arthritis    ? Arthropathy of right hip 6/13/2017   ? Bacteremia due to Escherichia coli 12/20/2018   ? 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  Uses Reglan, once daily 5/14/2013 Barahona's, repeat 3 years 12/2015 Barretts without dysplasia, repeat in 3 years.   Formatting of this note might be different from the original. 2010, patient recalls scope, patient recalls was told to recheck in 5 years.  Uses Reglan, once daily 5/14/2013 Barahona's, repeat 3    ? Barahona's esophagus    ? Brain aneurysm 8/1/2006    Overview:  Left  Thrombotic--aneursym clip  Formatting of this note might be different  "from the original. Left  Thrombotic--aneursym clip  Formatting of this note might be different from the original. brain aneurysm 2006 aug, left with right leg weakness   ? CAD (coronary artery disease) 3/7/2012    Overview:  Noted in old records, normal nuclear stress test 2010   Formatting of this note might be different from the original. Noted in old records, normal nuclear stress test 2010   ? Cerebral aneurysm     right leg weakness residual   ? Chronic kidney disease     CKD stage 3   ? Cigarette nicotine dependence in remission 11/14/2016   ? Colon polyps     past hx.   ? Coronary artery disease    ? CVA (cerebral vascular accident) (H) 12/30/2016   ? Depression    ? Depression with anxiety 10/18/2011    Overview:   Formatting of this note might be different from the original.   ? Fall at home 12/30/2019   ? Fracture of lumbar vertebra (H) 4/5/2016    Overview:  Overview:  Bilateral L2 pedicle fracture Bilateral L2 pedicle fracture Replacement Utility updated for latest IMO load   Formatting of this note might be different from the original. Overview:  Bilateral L2 pedicle fracture   ? GERD (gastroesophageal reflux disease)    ? History of blood clots    ? History of colonic polyps 2/9/2012    Overview:  Last colonoscopy 9/22/9009 normal. Repeat recommended in 5 years.  5/16/2013 small polyp. Follow up  5 years.  12/15/2015 repeat colonoscopy normal. \"prep not great\".   Formatting of this note might be different from the original. Last colonoscopy 9/22/9009 normal. Repeat recommended in 5 years.  5/16/2013 small polyp. Follow up  5 years.  12/15/2015 repeat colonoscopy normal. \"prep not   ? History of transfusion    ? Hyperlipidemia    ? Hypertension    ? Hypertension, goal below 140/80 12/30/2016   ? Hypokalemia    ? Iron deficiency anemia 2/22/2016    Overview:  12/2015 EGD and colonoscopy did not show significant source of bleeding.  Only gastritis seen on upper endoscopy  Formatting of this note might be " different from the original. 12/2015 EGD and colonoscopy did not show significant source of bleeding.  Only gastritis seen on upper endoscopy 2/3/2020 colonoscopy showed only a hyperplastic polyp  2021- Follows with Dr. Alonzo for iron infusi   ? Kyphosis of thoracic region 11/14/2016   ? Lactose intolerance    ? Low back pain 4/5/2016   ? Lumbar pseudoarthrosis    ? Lumbar radiculopathy 11/9/2018   ? Lumbar vertebral fracture (H)    ? Major depression, recurrent (H) 12/30/2016   ? Nontraumatic compression fracture of T7 vertebra (H) 11/14/2016   ? Osteoporosis    ? Pain medication agreement 3/13/2015    Overview:  Takes one Norco at bedtime for knee pain Started trial of Tramadol   ? Parathyroid adenoma    ? Postoperative anemia    ? Primary osteoarthritis of right knee 2/10/2021   ? Pseudoarthrosis of lumbar spine 4/5/2016   ? Pseudophakia, both eyes 5/19/2017   ? PVD (peripheral vascular disease) (H)    ? Right hip pain    ? S/P lumbar spinal fusion 12/5/2012    Overview:  Dr. Rooney, 7/2012 4/2016 Seen by Dr. Saskia Gastelum neurosurgery at [facility], needs repeat surgery, spontaneous pedicle fractures,  but treatment first for osteoporosis with Foteo.   Formatting of this note might be different from the original. Dr. Rooney, 7/2012 4/2016 Seen by Dr. Saskia Gastelum neurosurgery at [facility], needs repeat surgery, spontaneous pedicle fr   ? S/P TKR (total knee replacement), right 4/26/2021   ? Scheuermann's kyphosis 7/19/2016   ? Senile osteoporosis 5/9/2012    Overview:   history of Barahona's so can not take bisphosphonates. Referred to endocrinology. 5/23/2012 seen by Dr. Stout, recommended IV reclast, at some point, timing will not affect upcoming spine surgery.  Seen by Dr. Denise, treated with Reclast 6/25/2014, 7/9/2015 Referred to Dr. Lala by neurosurgery Dr. Saskia Gastelum at [facility]. due to the need for repeat spinal surgery and persi   ? Severe sepsis (H) 12/16/2018   ?  Stage 3a chronic kidney disease 12/30/2016   ? Status post total hip replacement, right 10/6/2017   ? Stroke (H)     cerebral infarction-aneurysm   ? Synovial cyst of left popliteal space 2/10/2021   ? Ureteral stone 10/31/2018    Overview:  Treated with stent 4/2018 Dr. Dozier   Formatting of this note might be different from the original. Treated with stent 4/2018 Dr. Dozier   ? Urinary retention with incomplete bladder emptying 11/26/2018   ? Vitamin D deficiency    ? Xerostomia     dryness in mouth           Surgical History:  Past Surgical History:   Procedure Laterality Date   ? APPENDECTOMY     ? BACK SURGERY     ? CEREBRAL ANEURYSM REPAIR  2006    clipped   ? CEREBRAL ANEURYSM REPAIR     ? CERVICAL LAMINECTOMY      C6-7   ? CHOLECYSTECTOMY     ? EYE SURGERY Bilateral     cat   ? FEMORAL BYPASS  1995    left   ? FRACTURE SURGERY Right     hip pinning   ? HEMORRHOID SURGERY     ? HYSTERECTOMY      BSO   ? LUMBAR FUSION      L3-4, L4-5   ? PARATHYROIDECTOMY N/A 12/30/2016    Procedure: NECK EXPLORATION FOR PARATHYROID ;  Surgeon: Gilberto Snowden MD;  Location: [facility] OR;  Service:    ? REVISION TOTAL HIP ARTHROPLASTY Right 10/6/2017    Procedure: REMOVAL OF TROCHANTERIC NAIL CONVERSION TO RIGHT TOTAL HIP ARTRHOPLASTY;  Surgeon: Seb Ortiz DO;  Location: [facility] OR;  Service:    ? right wrist closed reduction         Family History:   Family History   Problem Relation Age of Onset   ? Cerebral aneurysm Sister        Social History:    Social History     Socioeconomic History   ? Marital status:      Spouse name: None   ? Number of children: None   ? Years of education: None   ? Highest education level: None   Occupational History   ? None   Social Needs   ? Financial resource strain: None   ? Food insecurity     Worry: None     Inability: None   ? Transportation needs     Medical: None     Non-medical: None   Tobacco Use   ? Smoking status: Former Smoker     Packs/day: 0.50      Years: 15.00     Pack years: 7.50     Quit date: 1/1/2006     Years since quitting: 15.3   ? Smokeless tobacco: Never Used   Substance and Sexual Activity   ? Alcohol use: Yes     Comment: rare   ? Drug use: No   ? Sexual activity: None   Lifestyle   ? Physical activity     Days per week: None     Minutes per session: None   ? Stress: None   Relationships   ? Social connections     Talks on phone: None     Gets together: None     Attends Mandaen service: None     Active member of club or organization: None     Attends meetings of clubs or organizations: None     Relationship status: None   ? Intimate partner violence     Fear of current or ex partner: None     Emotionally abused: None     Physically abused: None     Forced sexual activity: None   Other Topics Concern   ? None   Social History Narrative   ? None          Review of Systems   Constitutional:        Patient denies any fevers chills nausea vomit diarrhea change in vision hearing taste or smell weakness one-sided chest pain shortness of breath.  Denies any current shortness stool polyphagia polydipsia polyuria depression or anxiety and the main review of systems negative.    Pain well controlled at rest but not with ambulation.       Vitals:    04/30/21 0930   BP: 136/63   Pulse: 63   Resp: 18   Temp: 97.3  F (36.3  C)   SpO2: 90%       Physical Exam  Constitutional:       General: She is not in acute distress.  HENT:      Head: Normocephalic and atraumatic.      Nose: Nose normal.   Cardiovascular:      Rate and Rhythm: Normal rate and regular rhythm.   Pulmonary:      Effort: Pulmonary effort is normal. No respiratory distress.      Breath sounds: No wheezing.   Musculoskeletal:      Right lower leg: No edema.      Left lower leg: No edema.      Comments: Patient is a hydrocolloid dressing on at this time.   Skin:     General: Skin is warm and dry.   Neurological:      Mental Status: She is alert. Mental status is at baseline.         Medication  List:  Current Outpatient Medications   Medication Sig   ? acetaminophen (TYLENOL) 500 MG tablet Take 1,000 mg by mouth every 6 (six) hours.   ? alendronate (FOSAMAX) 70 MG tablet TAKE 1 TABLET BY MOUTH EVERY 7 DAYS. TAKE IN THE MORNING ON AN EMPTY STOMACH WITH A FULL GLASS OF WATER 30 MINUTES BEFORE FOOD.   ? amLODIPine (NORVASC) 10 MG tablet Take 10 mg by mouth daily.   ? aspirin 81 mg chewable tablet Chew 81 mg daily.   ? calcium carbonate-vitamin D3 600 mg(1,500mg) -800 unit per tablet Take 1 tablet by mouth daily.   ? cholecalciferol, vitamin D3, 1,000 unit tablet Take 3,000 Units by mouth daily.   ? clopidogrel (PLAVIX) 75 mg tablet Take 75 mg by mouth daily.   ? cyanocobalamin 1000 MCG tablet Take 1,000 mcg by mouth daily.   ? esomeprazole (NEXIUM) 20 MG capsule Take 20 mg by mouth daily before breakfast.   ? gabapentin (NEURONTIN) 100 MG capsule Take 100 mg by mouth 3 (three) times a day. Gradually taper up per your doctors instructions   ? hydroCHLOROthiazide (HYDRODIURIL) 25 MG tablet Take 25 mg by mouth daily.   ? HYDROcodone-acetaminophen 5-325 mg per tablet Take 1-2 tablets by mouth every 4 (four) hours as needed for pain.   ? lisinopriL (PRINIVIL,ZESTRIL) 40 MG tablet Take 40 mg by mouth daily.   ? mirtazapine (REMERON) 30 MG tablet Take 30 mg by mouth bedtime.   ? MULTIVITAMIN ORAL Take 1 tablet by mouth daily.    ? pravastatin (PRAVACHOL) 40 MG tablet Take 40 mg by mouth bedtime.   ? senna (SENOKOT) 8.6 mg tablet Take 2 tablets by mouth 2 (two) times a day.   ? sertraline (ZOLOFT) 50 MG tablet TAKE 3 TABLETS BY MOUTH EVERY MORNING       Labs: 2021 hemoglobin is 10.6, MCV was 96.      Assessment:    ICD-10-CM    1. S/P TKR (total knee replacement), right  Z96.651    2. Iron deficiency anemia, unspecified iron deficiency anemia type  D50.9    3. Stage 3a chronic kidney disease  N18.31    4. Essential hypertension  I10    5. Peripheral vascular disease (H)  I73.9    6. Coronary artery disease due to  lipid rich plaque  I25.10     I25.83    7. Pain management  R52        Plan: Plan at this time will DC scheduled Tylenol at this time and for pain we will go with Norco as scheduled.  Pain assessments will be done every 4 hours and will maximize nonpharmacologic modalities with cool pack to the right knee continuously and change every 2 hours.    We will check a basic metabolic profile hemoglobin on Monday secondary to chronic kidney disease and anemia.    No other changes to medications at this time we will continue to monitor above medical problems and no other changes to care plan at this time.        Electronically signed by: Corbin Brown DO

## 2021-06-21 NOTE — LETTER
Letter by Mayi Clifford CNP at      Author: Mayi Clifford CNP Service: -- Author Type: --    Filed:  Encounter Date: 5/4/2021 Status: (Other)         Patient: Susan Patrick   MR Number: [id number]   YOB: 1945   Date of Visit: 5/4/2021     Code Status:  FULL CODE  Visit Type: Problem Visit (right TKA)     Facility:  [facility] [[id number]]             History of Present Illness: Susan Patrick is a 75 y.o. female who I am seeing today for follow up on the TCU s/p right total knee arthroplasty secondary to DJD. Past medical history includes peripheral vascular disease with a history of femoropopliteal bypass with stenting, hypertension, cerebral infarct, iron deficiency anemia chronic, CAD and chronic kidney disease stage III.  Patient underwent right total knee arthroplasty on 4/26/2021.  Postoperative complications included some acute blood loss anemia.  Hemoglobin 10.6.  She does have a history of chronic iron deficiency anemia.  She is followed by [place] [facility] outpatient.  She does receive regular iron infusions.  She has a history of bone marrow biopsy and malignancy evaluation in the past that was unremarkable.  She tells me today that she did receive iron transfusion a few weeks prior to her surgery.  Peripheral vascular disease with a history of femoropopliteal bypass.  She continues on aspirin and Plavix.  Also on statin.  Hypertension on amlodipine, hydrochlorothiazide and lisinopril.  She did have some intermittently high blood pressures during hospitalization.  This was thought to be secondary to pain.  History rule cerebral infarct on aspirin Plavix and statin.  CAD.  Stage III chronic kidney disease with creatinine around 0.83.    Today patient sitting up in bedside chair. Right total knee arthroplasty. Incision covered with Tegaderm. She continues on hydrocodone and Tylenol for pain. Hemoglobin 10.8. She does have some increased lower extremity  edema noted on today's visit. We will have her wear RUPERTO hose. On my last visit she did have a coarse wet cough. Chest x-ray was negative for any acute process. She has been doing incentive spirometer. Today only occasional wheeze. No cough. Recent creatinine within normal limits. She is eating and drinking well. She is having regular bowel movements.      Active Ambulatory Problems     Diagnosis Date Noted   ? Pseudoarthrosis of lumbar spine 04/05/2016   ? Low back pain 04/05/2016   ? Fracture of lumbar vertebra (H) 04/05/2016   ? Vitamin D deficiency 04/26/2016   ? Scheuermann's kyphosis 07/19/2016   ? Nontraumatic compression fracture of T7 vertebra (H) 11/14/2016   ? Cigarette nicotine dependence in remission 11/14/2016   ? Acute pain of right hip 11/14/2016   ? Kyphosis of thoracic region 11/14/2016   ? Parathyroid adenoma 12/06/2016   ? Hypertension, goal below 140/80 12/30/2016   ? PVD (peripheral vascular disease) (H) 08/24/2010   ? CVA (cerebral vascular accident) (H) 12/30/2016   ? 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 Major depression, recurrent (H) 12/30/2016   ? Stage 3a chronic kidney disease 12/30/2016   ? Arthropathy of right hip 06/13/2017   ? Status post total hip replacement, right 10/06/2017   ? Brain aneurysm 08/01/2006   ? Iron deficiency anemia 02/22/2016   ? Lactose intolerance 07/23/2013   ? Pain medication agreement 03/13/2015   ? History of colonic polyps 02/09/2012   ? S/P lumbar spinal fusion 12/05/2012   ? Lumbar radiculopathy 11/09/2018   ? Lumbar pseudoarthrosis    ? Postoperative anemia    ? Urinary retention with incomplete bladder emptying 11/26/2018   ? Severe sepsis (H) 12/16/2018   ? Acute cystitis without hematuria    ? Hypokalemia    ? LADARIUS (acute kidney injury) (H)    ? Bacteremia due to Escherichia coli 12/20/2018   ? CAD (coronary artery disease) 03/07/2012   ? Pseudophakia, both eyes 05/19/2017   ? Senile osteoporosis 05/09/2012   ? Ureteral stone 10/31/2018   ? Xerostomia  09/16/2016   ? Aftercare following right knee joint replacement surgery 12/18/2012   ? Fall at home 12/30/2019   ? Primary osteoarthritis of right knee 02/10/2021   ? Synovial cyst of left popliteal space 02/10/2021   ? S/P TKR (total knee replacement), right 04/26/2021     Resolved Ambulatory Problems     Diagnosis Date Noted   ? Flatback syndrome of lumbar region 04/05/2016   ? Osteoporosis 04/05/2016   ? Depression with anxiety 10/18/2011     Past Medical History:   Diagnosis Date   ? Anemia    ? Anxiety    ? Arthritis    ? Barahona's esophagus    ? Cerebral aneurysm    ? Chronic kidney disease    ? Colon polyps    ? Coronary artery disease    ? Depression    ? GERD (gastroesophageal reflux disease)    ? History of blood clots    ? History of transfusion    ? Hyperlipidemia    ? Hypertension    ? Lumbar vertebral fracture (H)    ? Right hip pain    ? Stroke (H)      Current Outpatient Medications   Medication Sig   ? acetaminophen (TYLENOL) 500 MG tablet Take 1,000 mg by mouth every 6 (six) hours.   ? alendronate (FOSAMAX) 70 MG tablet TAKE 1 TABLET BY MOUTH EVERY 7 DAYS. TAKE IN THE MORNING ON AN EMPTY STOMACH WITH A FULL GLASS OF WATER 30 MINUTES BEFORE FOOD.   ? amLODIPine (NORVASC) 10 MG tablet Take 10 mg by mouth daily.   ? aspirin 81 mg chewable tablet Chew 81 mg daily.   ? calcium carbonate-vitamin D3 600 mg(1,500mg) -800 unit per tablet Take 1 tablet by mouth daily.   ? cholecalciferol, vitamin D3, 1,000 unit tablet Take 3,000 Units by mouth daily.   ? clopidogrel (PLAVIX) 75 mg tablet Take 75 mg by mouth daily.   ? cyanocobalamin 1000 MCG tablet Take 1,000 mcg by mouth daily.   ? esomeprazole (NEXIUM) 20 MG capsule Take 20 mg by mouth daily before breakfast.   ? gabapentin (NEURONTIN) 100 MG capsule Take 100 mg by mouth 3 (three) times a day. Gradually taper up per your doctors instructions   ? hydroCHLOROthiazide (HYDRODIURIL) 25 MG tablet Take 25 mg by mouth daily.   ? HYDROcodone-acetaminophen 5-325 mg  per tablet Take 1-2 tablets by mouth every 4 (four) hours as needed for pain.   ? lisinopriL (PRINIVIL,ZESTRIL) 40 MG tablet Take 40 mg by mouth daily.   ? mirtazapine (REMERON) 30 MG tablet Take 30 mg by mouth bedtime.   ? MULTIVITAMIN ORAL Take 1 tablet by mouth daily.    ? pravastatin (PRAVACHOL) 40 MG tablet Take 40 mg by mouth bedtime.   ? senna (SENOKOT) 8.6 mg tablet Take 2 tablets by mouth 2 (two) times a day.   ? sertraline (ZOLOFT) 50 MG tablet TAKE 3 TABLETS BY MOUTH EVERY MORNING       Allergies   Allergen Reactions   ? Lactose Other (See Comments)     Diarrhea; Lactose intolerant   ? Metoprolol Unknown     Bradycardia, heart rate in 30's    ? Percocet [Oxycodone-Acetaminophen] Other (See Comments)     hallucinations   ? Augmentin [Amoxicillin-Pot Clavulanate] Hives, Swelling and Rash     Caused sores, facial swelling         Review of Systems  No fevers or chills. No headache, lightheadedness or dizziness. No SOB, chest pains or palpitations. Appetite is fair. No nausea, vomiting, constipation or diarrhea. No dysuria, frequency, burning or pain with urination.  Patient continues on hydrocodone for pain.  Otherwise review of systems are negative.     Physical Exam  PHYSICAL EXAMINATION:  Vital signs: /66, pulse 64, respirations 18, temperature 97.7, O2 sat 96% on room air.  Weight 136.4 pounds.  General: Awake, Alert, oriented x3, appropriately, follows simple commands, conversant  HEENT:PERRLA, Pink conjunctiva, anicteric sclerae,  dry oral mucosa  NECK: Supple, without any lymphadenopathy, or masses  CVS:  S1  S2, without murmur or gallop.   LUNG: Occasional expiratory wheeze. No cough.   BACK: No kyphosis of the thoracic spine  ABDOMEN: Soft, nontender to palpation, with positive bowel sounds  EXTREMITIES: Movesboth upper and lower extremities with diffuse weakness, 1+ pedal edema greater on the surgical side, no calf tenderness.   SKIN: Left knee incision covered with Tegaderm dressing.  She  does have moderate bruising and swelling surrounding.  NEUROLOGIC: Intact, pulses palpable  PSYCHIATRIC: Cognition intact      Labs:    Lab Results   Component Value Date    WBC 10.6 04/30/2021    HGB 10.8 (L) 05/03/2021    HCT 35.5 04/30/2021    MCV 95 04/30/2021     04/30/2021     Results for orders placed or performed in visit on 04/30/21   Basic Metabolic Panel   Result Value Ref Range    Sodium 137 136 - 145 mmol/L    Potassium 4.1 3.5 - 5.0 mmol/L    Chloride 98 98 - 107 mmol/L    CO2 30 22 - 31 mmol/L    Anion Gap, Calculation 9 5 - 18 mmol/L    Glucose 96 70 - 125 mg/dL    Calcium 10.0 8.5 - 10.5 mg/dL    BUN 20 8 - 28 mg/dL    Creatinine 0.79 0.60 - 1.10 mg/dL    GFR MDRD Af Amer >60 >60 mL/min/1.73m2    GFR MDRD Non Af Amer >60 >60 mL/min/1.73m2           Assessment/Plan:  ,  1. S/P TKR (total knee replacement), right   continues in therapy.  Pain control with hydrocodone.   2. Iron deficiency anemia, unspecified iron deficiency anemia type   patient receives iron transfusions to the [facility] oncology.  Hemoglobin  Now 10.8.   3. Stage 3a chronic kidney disease   creatinine stable follow-up BMP unremarkable..    4. Essential hypertension   satisfactory controlled.    5. Peripheral vascular disease (H)   continues on Plavix, aspirin and statin.    6. Coronary artery disease due to lipid rich plaque   continues on Plavix, aspirin and statin.       This note has been dictated using voice recognition software. Any grammatical or context distortions are unintentional and inherent to the software    Electronically signed by: Mayi Clifford, CNP

## 2021-06-21 NOTE — LETTER
Letter by Mayi Clifford CNP at      Author: Mayi Clifford CNP Service: -- Author Type: --    Filed:  Encounter Date: 4/29/2021 Status: (Other)         Patient: Susan Patrick   MR Number: [id number]   YOB: 1945   Date of Visit: 4/29/2021     Code Status:  FULL CODE  Visit Type: Problem Visit (Right total knee arthroplasty)     Facility:  [facility] [[id number]]             History of Present Illness: Susan Patrick is a 75 y.o. female who I am seeing today for follow up on the TCU s/p right total knee arthroplasty secondary to DJD. Past medical history includes peripheral vascular disease with a history of femoropopliteal bypass with stenting, hypertension, cerebral infarct, iron deficiency anemia chronic, CAD and chronic kidney disease stage III.  Patient underwent right total knee arthroplasty on 4/26/2021.  Postoperative complications included some acute blood loss anemia.  Hemoglobin 10.6.  She does have a history of chronic iron deficiency anemia.  She is followed by [place] [facility] outpatient.  She does receive regular iron infusions.  She has a history of bone marrow biopsy and malignancy evaluation in the past that was unremarkable.  She tells me today that she did receive iron transfusion a few weeks prior to her surgery.  Peripheral vascular disease with a history of femoropopliteal bypass.  She continues on aspirin and Plavix.  Also on statin.  Hypertension on amlodipine, hydrochlorothiazide and lisinopril.  She did have some intermittently high blood pressures during hospitalization.  This was thought to be secondary to pain.  History rule cerebral infarct on aspirin Plavix and statin.  CAD.  Stage III chronic kidney disease with creatinine around 0.83.    Today patient sitting up in bedside chair.  She has had some increased pain.  Continues on Vicodin for pain.  She is using ice.  She does have moderate edema surrounding the incision.  She denies any  chest pain.  She is having some expiratory wheezing.  Coarse wet cough also noted.  Patient afebrile.  We will get a chest x-ray.  No real lower extremity edema.  She has fair appetite.  She is having regular bowel movements.  Hemoglobin 8.8.      Active Ambulatory Problems     Diagnosis Date Noted   ? Pseudoarthrosis of lumbar spine 04/05/2016   ? Low back pain 04/05/2016   ? Fracture of lumbar vertebra (H) 04/05/2016   ? Vitamin D deficiency 04/26/2016   ? Scheuermann's kyphosis 07/19/2016   ? Nontraumatic compression fracture of T7 vertebra (H) 11/14/2016   ? Cigarette nicotine dependence in remission 11/14/2016   ? Acute pain of right hip 11/14/2016   ? Kyphosis of thoracic region 11/14/2016   ? Parathyroid adenoma 12/06/2016   ? Hypertension, goal below 140/80 12/30/2016   ? PVD (peripheral vascular disease) (H) 08/24/2010   ? CVA (cerebral vascular accident) (H) 12/30/2016   ? 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 Major depression, recurrent (H) 12/30/2016   ? Stage 3a chronic kidney disease 12/30/2016   ? Arthropathy of right hip 06/13/2017   ? Status post total hip replacement, right 10/06/2017   ? Brain aneurysm 08/01/2006   ? Iron deficiency anemia 02/22/2016   ? Lactose intolerance 07/23/2013   ? Pain medication agreement 03/13/2015   ? History of colonic polyps 02/09/2012   ? S/P lumbar spinal fusion 12/05/2012   ? Lumbar radiculopathy 11/09/2018   ? Lumbar pseudoarthrosis    ? Postoperative anemia    ? Urinary retention with incomplete bladder emptying 11/26/2018   ? Severe sepsis (H) 12/16/2018   ? Acute cystitis without hematuria    ? Hypokalemia    ? LADARIUS (acute kidney injury) (H)    ? Bacteremia due to Escherichia coli 12/20/2018   ? CAD (coronary artery disease) 03/07/2012   ? Pseudophakia, both eyes 05/19/2017   ? Senile osteoporosis 05/09/2012   ? Ureteral stone 10/31/2018   ? Xerostomia 09/16/2016   ? Aftercare following right knee joint replacement surgery 12/18/2012   ? Fall at home 12/30/2019   ?  Primary osteoarthritis of right knee 02/10/2021   ? Synovial cyst of left popliteal space 02/10/2021   ? S/P TKR (total knee replacement), right 04/26/2021     Resolved Ambulatory Problems     Diagnosis Date Noted   ? Flatback syndrome of lumbar region 04/05/2016   ? Osteoporosis 04/05/2016   ? Depression with anxiety 10/18/2011     Past Medical History:   Diagnosis Date   ? Anemia    ? Anxiety    ? Arthritis    ? Barahona's esophagus    ? Cerebral aneurysm    ? Chronic kidney disease    ? Colon polyps    ? Coronary artery disease    ? Depression    ? GERD (gastroesophageal reflux disease)    ? History of blood clots    ? History of transfusion    ? Hyperlipidemia    ? Hypertension    ? Lumbar vertebral fracture (H)    ? Right hip pain    ? Stroke (H)      Current Outpatient Medications   Medication Sig   ? acetaminophen (TYLENOL) 500 MG tablet Take 1,000 mg by mouth every 6 (six) hours.   ? alendronate (FOSAMAX) 70 MG tablet TAKE 1 TABLET BY MOUTH EVERY 7 DAYS. TAKE IN THE MORNING ON AN EMPTY STOMACH WITH A FULL GLASS OF WATER 30 MINUTES BEFORE FOOD.   ? amLODIPine (NORVASC) 10 MG tablet Take 10 mg by mouth daily.   ? aspirin 81 mg chewable tablet Chew 81 mg daily.   ? calcium carbonate-vitamin D3 600 mg(1,500mg) -800 unit per tablet Take 1 tablet by mouth daily.   ? cholecalciferol, vitamin D3, 1,000 unit tablet Take 3,000 Units by mouth daily.   ? clopidogrel (PLAVIX) 75 mg tablet Take 75 mg by mouth daily.   ? cyanocobalamin 1000 MCG tablet Take 1,000 mcg by mouth daily.   ? esomeprazole (NEXIUM) 20 MG capsule Take 20 mg by mouth daily before breakfast.   ? gabapentin (NEURONTIN) 100 MG capsule Take 100 mg by mouth 3 (three) times a day. Gradually taper up per your doctors instructions   ? hydroCHLOROthiazide (HYDRODIURIL) 25 MG tablet Take 25 mg by mouth daily.   ? HYDROcodone-acetaminophen 5-325 mg per tablet Take 1-2 tablets by mouth every 4 (four) hours as needed for pain.   ? lisinopriL (PRINIVIL,ZESTRIL) 40  MG tablet Take 40 mg by mouth daily.   ? mirtazapine (REMERON) 30 MG tablet Take 30 mg by mouth bedtime.   ? MULTIVITAMIN ORAL Take 1 tablet by mouth daily.    ? pravastatin (PRAVACHOL) 40 MG tablet Take 40 mg by mouth bedtime.   ? senna (SENOKOT) 8.6 mg tablet Take 2 tablets by mouth 2 (two) times a day.   ? sertraline (ZOLOFT) 50 MG tablet TAKE 3 TABLETS BY MOUTH EVERY MORNING       Allergies   Allergen Reactions   ? Lactose Other (See Comments)     Diarrhea; Lactose intolerant   ? Metoprolol Unknown     Bradycardia, heart rate in 30's    ? Percocet [Oxycodone-Acetaminophen] Other (See Comments)     hallucinations   ? Augmentin [Amoxicillin-Pot Clavulanate] Hives, Swelling and Rash     Caused sores, facial swelling         Review of Systems  No fevers or chills. No headache, lightheadedness or dizziness. No SOB, chest pains or palpitations. Appetite is fair. No nausea, vomiting, constipation or diarrhea. No dysuria, frequency, burning or pain with urination.  Patient continues on hydrocodone for pain.  Otherwise review of systems are negative.     Physical Exam  PHYSICAL EXAMINATION:  Vital signs: /74, pulse 63, respirations 20, temperature 97.2, O2 sat 93% on room air.  Weight 141.2 pounds.  General: Awake, Alert, oriented x3, appropriately, follows simple commands, conversant  HEENT:PERRLA, Pink conjunctiva, anicteric sclerae,  dry oral mucosa  NECK: Supple, without any lymphadenopathy, or masses  CVS:  S1  S2, without murmur or gallop.   LUNG: Expiratory wheezing throughout noted.  Coarse wet cough.  BACK: No kyphosis of the thoracic spine  ABDOMEN: Soft, nontender to palpation, with positive bowel sounds  EXTREMITIES: Movesboth upper and lower extremities with diffuse weakness, trace pedal edema greater on the surgical side, no calf tenderness.   SKIN: Left knee incision covered with Tegaderm dressing.  She does have moderate bruising and swelling surrounding.  NEUROLOGIC: Intact, pulses  palpable  PSYCHIATRIC: Cognition intact      Labs:    Lab Results   Component Value Date    WBC 7.6 01/09/2019    HGB 8.8 (L) 01/09/2019    HCT 29.6 (L) 01/09/2019    MCV 85 01/09/2019     01/09/2019     Results for orders placed or performed during the hospital encounter of 12/16/18   Basic Metabolic Panel   Result Value Ref Range    Sodium 143 136 - 145 mmol/L    Potassium 4.4 3.5 - 5.0 mmol/L    Chloride 113 (H) 98 - 107 mmol/L    CO2 21 (L) 22 - 31 mmol/L    Anion Gap, Calculation 9 5 - 18 mmol/L    Glucose 107 70 - 125 mg/dL    Calcium 8.9 8.5 - 10.5 mg/dL    BUN 11 8 - 28 mg/dL    Creatinine 0.67 0.60 - 1.10 mg/dL    GFR MDRD Af Amer >60 >60 mL/min/1.73m2    GFR MDRD Non Af Amer >60 >60 mL/min/1.73m2           Assessment/Plan:  ,  1. S/P TKR (total knee replacement), right   continues in therapy.  Pain control with hydrocodone.   2. Iron deficiency anemia, unspecified iron deficiency anemia type   patient receives iron transfusions to the  of  oncology.  Hemoglobin 8.8.  Follow-up CBC in the a.m.   3. Stage 3a chronic kidney disease   creatinine stable 0.83.  Follow-up BMP in the a.m.   4. Essential hypertension   satisfactory controlled.    5. Peripheral vascular disease (H)   continues on Plavix, aspirin and statin.    6. Coronary artery disease due to lipid rich plaque   continues on Plavix, aspirin and statin.     35 minutes minutes spent of which greater than 50% % was face to face communication with the patient reviewing pain management, history of anemia as well as collaborating with nursing staff and therapy.    This note has been dictated using voice recognition software. Any grammatical or context distortions are unintentional and inherent to the software    Electronically signed by: Mayi Clifford, MOHSEN

## 2021-06-21 NOTE — LETTER
Letter by Mayi Clifford CNP at      Author: Mayi Clifford CNP Service: -- Author Type: --    Filed:  Encounter Date: 5/6/2021 Status: (Other)         Patient: Susan Patrick   MR Number: [id number]   YOB: 1945   Date of Visit: 5/6/2021     Code Status:  FULL CODE  Visit Type: Discharge Summary     Facility:  [facility] [[id number]]             History of Present Illness: Susan Patrick is a 75 y.o. female who I am seeing today for discharge from  the TCU s/p right total knee arthroplasty secondary to DJD. Past medical history includes peripheral vascular disease with a history of femoropopliteal bypass with stenting, hypertension, cerebral infarct, iron deficiency anemia chronic, CAD and chronic kidney disease stage III.  Patient underwent right total knee arthroplasty on 4/26/2021.  Postoperative complications included some acute blood loss anemia.  Hemoglobin 10.6.  She does have a history of chronic iron deficiency anemia.  She is followed by [place] [facility] outpatient.  She does receive regular iron infusions.  She has a history of bone marrow biopsy and malignancy evaluation in the past that was unremarkable.  She tells me today that she did receive iron transfusion a few weeks prior to her surgery.  Peripheral vascular disease with a history of femoropopliteal bypass.  She continues on aspirin and Plavix.  Also on statin.  Hypertension on amlodipine, hydrochlorothiazide and lisinopril.  She did have some intermittently high blood pressures during hospitalization.  This was thought to be secondary to pain.  History rule cerebral infarct on aspirin Plavix and statin.  CAD.  Stage III chronic kidney disease with creatinine around 0.83.    Today patient sitting up in bedside chair. Pt s/p right total knee arthroplasty. Pain well controlled with hydrocodone and tylenol. Pt ambulatory with rolling walker. Pt is fearful her knee is crooked. She has follow up with  surgeon today. Chronic anemia. Pt has received iron transfusion in the past. Hbg 10.8. She continues on B12 supplement. Pt edema improving. Wheezing now subsided. She continues on IS. Blood pressure satisfactory.         Active Ambulatory Problems     Diagnosis Date Noted   ? Pseudoarthrosis of lumbar spine 04/05/2016   ? Low back pain 04/05/2016   ? Fracture of lumbar vertebra (H) 04/05/2016   ? Vitamin D deficiency 04/26/2016   ? Scheuermann's kyphosis 07/19/2016   ? Nontraumatic compression fracture of T7 vertebra (H) 11/14/2016   ? Cigarette nicotine dependence in remission 11/14/2016   ? Acute pain of right hip 11/14/2016   ? Kyphosis of thoracic region 11/14/2016   ? Parathyroid adenoma 12/06/2016   ? Hypertension, goal below 140/80 12/30/2016   ? PVD (peripheral vascular disease) (H) 08/24/2010   ? CVA (cerebral vascular accident) (H) 12/30/2016   ? 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 Major depression, recurrent (H) 12/30/2016   ? Stage 3a chronic kidney disease 12/30/2016   ? Arthropathy of right hip 06/13/2017   ? Status post total hip replacement, right 10/06/2017   ? Brain aneurysm 08/01/2006   ? Iron deficiency anemia 02/22/2016   ? Lactose intolerance 07/23/2013   ? Pain medication agreement 03/13/2015   ? History of colonic polyps 02/09/2012   ? S/P lumbar spinal fusion 12/05/2012   ? Lumbar radiculopathy 11/09/2018   ? Lumbar pseudoarthrosis    ? Postoperative anemia    ? Urinary retention with incomplete bladder emptying 11/26/2018   ? Severe sepsis (H) 12/16/2018   ? Acute cystitis without hematuria    ? Hypokalemia    ? LADARIUS (acute kidney injury) (H)    ? Bacteremia due to Escherichia coli 12/20/2018   ? CAD (coronary artery disease) 03/07/2012   ? Pseudophakia, both eyes 05/19/2017   ? Senile osteoporosis 05/09/2012   ? Ureteral stone 10/31/2018   ? Xerostomia 09/16/2016   ? Aftercare following right knee joint replacement surgery 12/18/2012   ? Fall at home 12/30/2019   ? Primary osteoarthritis of  right knee 02/10/2021   ? Synovial cyst of left popliteal space 02/10/2021   ? S/P TKR (total knee replacement), right 04/26/2021     Resolved Ambulatory Problems     Diagnosis Date Noted   ? Flatback syndrome of lumbar region 04/05/2016   ? Osteoporosis 04/05/2016   ? Depression with anxiety 10/18/2011     Past Medical History:   Diagnosis Date   ? Anemia    ? Anxiety    ? Arthritis    ? Barahona's esophagus    ? Cerebral aneurysm    ? Chronic kidney disease    ? Colon polyps    ? Coronary artery disease    ? Depression    ? GERD (gastroesophageal reflux disease)    ? History of blood clots    ? History of transfusion    ? Hyperlipidemia    ? Hypertension    ? Lumbar vertebral fracture (H)    ? Right hip pain    ? Stroke (H)      Current Outpatient Medications   Medication Sig   ? acetaminophen (TYLENOL) 500 MG tablet Take 1,000 mg by mouth every 6 (six) hours.   ? alendronate (FOSAMAX) 70 MG tablet TAKE 1 TABLET BY MOUTH EVERY 7 DAYS. TAKE IN THE MORNING ON AN EMPTY STOMACH WITH A FULL GLASS OF WATER 30 MINUTES BEFORE FOOD.   ? amLODIPine (NORVASC) 10 MG tablet Take 10 mg by mouth daily.   ? aspirin 81 mg chewable tablet Chew 81 mg daily.   ? calcium carbonate-vitamin D3 600 mg(1,500mg) -800 unit per tablet Take 1 tablet by mouth daily.   ? cholecalciferol, vitamin D3, 1,000 unit tablet Take 3,000 Units by mouth daily.   ? clopidogrel (PLAVIX) 75 mg tablet Take 75 mg by mouth daily.   ? cyanocobalamin 1000 MCG tablet Take 1,000 mcg by mouth daily.   ? esomeprazole (NEXIUM) 20 MG capsule Take 20 mg by mouth daily before breakfast.   ? gabapentin (NEURONTIN) 100 MG capsule Take 100 mg by mouth 3 (three) times a day. Gradually taper up per your doctors instructions   ? hydroCHLOROthiazide (HYDRODIURIL) 25 MG tablet Take 25 mg by mouth daily.   ? HYDROcodone-acetaminophen 5-325 mg per tablet Take 1-2 tablets by mouth every 4 (four) hours as needed for pain.   ? lisinopriL (PRINIVIL,ZESTRIL) 40 MG tablet Take 40 mg by  mouth daily.   ? mirtazapine (REMERON) 30 MG tablet Take 30 mg by mouth bedtime.   ? MULTIVITAMIN ORAL Take 1 tablet by mouth daily.    ? pravastatin (PRAVACHOL) 40 MG tablet Take 40 mg by mouth bedtime.   ? senna (SENOKOT) 8.6 mg tablet Take 2 tablets by mouth 2 (two) times a day.   ? sertraline (ZOLOFT) 50 MG tablet TAKE 3 TABLETS BY MOUTH EVERY MORNING       Allergies   Allergen Reactions   ? Lactose Other (See Comments)     Diarrhea; Lactose intolerant   ? Metoprolol Unknown     Bradycardia, heart rate in 30's    ? Percocet [Oxycodone-Acetaminophen] Other (See Comments)     hallucinations   ? Augmentin [Amoxicillin-Pot Clavulanate] Hives, Swelling and Rash     Caused sores, facial swelling         Review of Systems  No fevers or chills. No headache, lightheadedness or dizziness. No SOB, chest pains or palpitations. Appetite is fair. No nausea, vomiting, constipation or diarrhea. No dysuria, frequency, burning or pain with urination.  Patient continues on hydrocodone for pain.  Otherwise review of systems are negative.     Physical Exam  PHYSICAL EXAMINATION:  Vital signs: /49, pulse 58, respirations 18, temperature 97.5, O2 sat 97% on room air.  Weight 134.8 pounds.  General: Awake, Alert, oriented x3, appropriately, follows simple commands, conversant  HEENT:PERRLA, Pink conjunctiva, anicteric sclerae,  dry oral mucosa  NECK: Supple, without any lymphadenopathy, or masses  CVS:  S1  S2, without murmur or gallop.   LUNG: Occasional expiratory wheeze. No cough.   BACK: No kyphosis of the thoracic spine  ABDOMEN: Soft, nontender to palpation, with positive bowel sounds  EXTREMITIES: Movesboth upper and lower extremities with diffuse weakness, 1+ pedal edema greater on the surgical side, no calf tenderness.   SKIN: Left knee incision covered with Tegaderm dressing.  She does have moderate bruising and swelling surrounding.  NEUROLOGIC: Intact, pulses palpable  PSYCHIATRIC: Cognition intact      Labs:    Lab  Results   Component Value Date    WBC 10.6 04/30/2021    HGB 10.8 (L) 05/03/2021    HCT 35.5 04/30/2021    MCV 95 04/30/2021     04/30/2021     Results for orders placed or performed in visit on 04/30/21   Basic Metabolic Panel   Result Value Ref Range    Sodium 137 136 - 145 mmol/L    Potassium 4.1 3.5 - 5.0 mmol/L    Chloride 98 98 - 107 mmol/L    CO2 30 22 - 31 mmol/L    Anion Gap, Calculation 9 5 - 18 mmol/L    Glucose 96 70 - 125 mg/dL    Calcium 10.0 8.5 - 10.5 mg/dL    BUN 20 8 - 28 mg/dL    Creatinine 0.79 0.60 - 1.10 mg/dL    GFR MDRD Af Amer >60 >60 mL/min/1.73m2    GFR MDRD Non Af Amer >60 >60 mL/min/1.73m2           Assessment/Plan:  ,  1. S/P TKR (total knee replacement), right   continues in therapy.  Pain control with hydrocodone.   2. Iron deficiency anemia, unspecified iron deficiency anemia type   patient receives iron transfusions to the  of  oncology.  Hemoglobin  Now 10.8.   3. Stage 3a chronic kidney disease   creatinine stable follow-up BMP unremarkable..    4. Essential hypertension   satisfactory controlled.    5. Peripheral vascular disease (H)   continues on Plavix, aspirin and statin.    6. Coronary artery disease due to lipid rich plaque   continues on Plavix, aspirin and statin.     Ok to discharge home with current meds, treatments and narcotics. Follow up with ortho today. Follow up with PCP in 1 week. Home PT, OT, HHA and RN for medication management. Follow up with hematology out pt.     DISCHARGE PLAN/FACE TO FACE:  I certify that this patient is under my care and that I, or a nurse practitioner or physician's assistant working with me, had a face-to-face encounter that meets the physician face-to-face encounter requirements with this patient.       I certify that, based on my findings, the following services are medically necessary home health services.    My clinical findings support the need for the above skilled services.    This patient is homebound because: S/P R  TKA.     Total time spent for this visit was 35 minutes with > 50% of time spent face to face with pt reviewing discharge medications, home care and follow ups.     The patient is, or has been, under my care and I have initiated the establishment of the plan of care. This patient will be followed by a physician who will periodically review the plan of care.      This note has been dictated using voice recognition software. Any grammatical or context distortions are unintentional and inherent to the software    Electronically signed by: Mayi Clifford CNP

## 2021-06-21 NOTE — PROGRESS NOTES
Susan Patrick is status post L4-S1 TF fusion on 11/9/2018 by Dr. Miranda.  Preoperatively presented with mechanical low back pain as well as left L5 radiculopathy.  Today she returns in follow up for staples removal She is doing reasonably well - her leg pain is gone, her back pain much improved. Reports improved strength in LE. Denies sensory disturbance in LE. Uses a walker for safety. Resides at [facility] (to be d/c home on 11/9/2018). Gait and balance are stable.      Surgical wound WNL - CDI, no signs of infection or skin breakdown.  Incision well-healed: good skin approximation, no redness or visible/palpable edema, no tenderness to palpation.  PT. AF, denies fever, chills or sweats.  Pt. reports that the symptoms are improved from pre-op.    Staples - intact removed without difficulty. Wound prepped with Betadine before and after removal.  Surrounding skin has no signs of breakdown.  Verbal instructions regarding incision care are given.  Pt. advised to call us if any s/s of infection noted - all discussed in details.      Tania York, RN, CNRN

## 2021-06-22 NOTE — PROGRESS NOTES
Code Status:  FULL CODE  Visit Type: Discharge Summary     Facility:  [facility] [[id number]]         Facility Type: SNF (Skilled Nursing Facility, TCU)    History of Present Illness: Susan Patrick is a 72 y.o. female who I am seeing today for follow-up on the TCU.  Patient with past medical history that includes peripheral vascular disease, CVA, coronary artery disease, depression, Barahona's esophagus, arthritis, anemia, GERD, hypertension, parathyroid adenoma and osteoporosis.  Patient with recent bilateral stress fractures in the L2 pedicles and pseudoarthrosis at L3-L4 and L4-5.  She has a history of lumbar fusion done in July 2012.  She was being treated out patiently with conservative measures however lucency surrounding her L5 screws progressed.  Patient status post L4-5 fusion and extension of the fusion to involve L5-S1 with hardware removal on 11/19/18.  Patient had postop urinary retention.  Continues with Bledsoe catheter.  Will attempt to remove this.  Patient complaining of lumbar pain with radiculopathy down into the left buttock and thigh.  She continues on Tylenol, Valium as needed, gabapentin and Dilaudid.  She tells me today that Dilaudid is not really working that she is taking the Valium which is somewhat helpful.  Chronic anemia.  Postoperative hemoglobin 9.1.    Today pt sitting up in bedside chair. She continues in TLSO. Recent xrays obtained which showed only degenerative changes of the hip. Old thoracic T7 fracture and stable surgical repair of the lumbar spine. She continues on diluadid and gabapentin for pain. She is moving quite well with rolling walker. Recent drop in hemoglobin to 7.9. She is on iron supplement two times a day. Guaics obtained. 1 negative and 1 positive. She continues on ASA. She completed her Bactrim DS for UTI. She denies any dysuria.       Active Ambulatory Problems     Diagnosis Date Noted     Pseudoarthrosis of lumbar spine 04/05/2016     Low back  pain 04/05/2016     Fracture of lumbar vertebra (H) 04/05/2016     Osteoporosis 04/05/2016     Vitamin D deficiency 04/26/2016     Scheuermann's kyphosis 07/19/2016     Non-traumatic compression fracture of T7 thoracic vertebra (H) 11/14/2016     Cigarette nicotine dependence in remission 11/14/2016     Acute pain of right hip 11/14/2016     Kyphosis of thoracic region 11/14/2016     Parathyroid adenoma 12/06/2016     Hypertension, goal below 140/80 12/30/2016     PVD (peripheral vascular disease) (H) 12/30/2016     CVA (cerebral vascular accident) (H) 12/30/2016     Barahona esophagus 12/30/2016     Major depression, recurrent (H) 12/30/2016     CKD (chronic kidney disease) stage 3, GFR 30-59 ml/min (H) 12/30/2016     Arthropathy of right hip 06/13/2017     Status post total hip replacement, right 10/06/2017     Cerebral infarction (H) 03/07/2012     Depression with anxiety 10/18/2011     Iron deficiency anemia 02/22/2016     Lactose intolerance 07/23/2013     Pain medication agreement 03/13/2015     History of colonic polyps 02/09/2012     S/P lumbar spinal fusion 12/05/2012     Lumbar radiculopathy 11/09/2018     Lumbar pseudoarthrosis      Postoperative anemia      Urinary retention with incomplete bladder emptying 11/26/2018     Resolved Ambulatory Problems     Diagnosis Date Noted     Flatback syndrome of lumbar region 04/05/2016     Past Medical History:   Diagnosis Date     Anemia      Anxiety      Arthritis      Barahona's esophagus      Cerebral aneurysm      Chronic kidney disease      Colon polyps      Coronary artery disease      Depression      GERD (gastroesophageal reflux disease)      History of blood clots      History of transfusion      Hyperlipidemia      Hypertension      Lactose intolerance      Lumbar vertebral fracture (H)      Osteoporosis      Parathyroid adenoma      PVD (peripheral vascular disease) (H)      Right hip pain      Stroke (H)      Vitamin D deficiency      Xerostomia           Current Outpatient Medications   Medication Sig Note     alendronate (FOSAMAX) 70 MG tablet Take 1 tablet (70 mg total) by mouth every 7 days. Take in the morning on an empty stomach with a full glass of water 30 minutes before food 11/9/2018: Takes every monday     amLODIPine (NORVASC) 10 MG tablet Take 10 mg by mouth daily.      calcium carbonate-vitamin D3 600 mg(1,500mg) -800 unit per tablet Take 1 tablet by mouth daily.      cholecalciferol, vitamin D3, 1,000 unit tablet Take 3,000 Units by mouth daily.      clopidogrel (PLAVIX) 75 mg tablet Take 75 mg by mouth daily.      esomeprazole magnesium (NEXIUM 24 HR) 22.3 mg capsule Take 22.3 mg by mouth every morning before breakfast.      ferrous sulfate 325 (65 FE) MG tablet Take 1 tablet by mouth twice a day with lunch and supper.       gabapentin (NEURONTIN) 100 MG capsule Take 100 mg by mouth 3 (three) times a day. (Patient taking differently: Take 100 mg by mouth 2 (two) times a day 100mg in am and mid day and 300 mg at HS..      )      hydroCHLOROthiazide (HYDRODIURIL) 25 MG tablet Take 25 mg by mouth daily.      HYDROmorphone (DILAUDID) 2 MG tablet Take 1 tablet (2 mg total) by mouth every 4 (four) hours as needed.      lisinopril (PRINIVIL,ZESTRIL) 20 MG tablet Take 20 mg by mouth daily.      mirtazapine (REMERON) 30 MG tablet Take 30 mg by mouth bedtime.      MULTIVITAMIN ORAL Take 1 tablet by mouth daily.       pravastatin (PRAVACHOL) 40 MG tablet Take 40 mg by mouth bedtime.      sertraline (ZOLOFT) 100 MG tablet Take 150 mg by mouth daily.         Allergies   Allergen Reactions     Lactose Other (See Comments)     Diarrhea; Lactose intolerant     Metoprolol Unknown     Bradycardia, heart rate in 30's      Percocet [Oxycodone-Acetaminophen] Other (See Comments)     hallucinations     Augmentin [Amoxicillin-Pot Clavulanate] Rash and Hives     Caused sores         Review of Systems   No fevers or chills. No headache, lightheadedness or dizziness. No  SOB, chest pains or palpitations. Appetite is good. No nausea, vomiting, constipation or diarrhea. No dysuria, frequency, burning or pain with urination. Pt reports pain in her left lower back radiating to her left buttock and down her left leg.        Physical Exam   PHYSICAL EXAMINATION:  Vital signs: /63, P 85, R 16, T96.8, O2 sat 97%.  Current weight 141.2 pounds.  General: Awake, Alert, oriented x3, appropriately, follows simple commands, conversant  HEENT:PERRLA, Pink conjunctiva, anicteric sclerae, moist oral mucosa  NECK: Supple, without any lymphadenopathy, or masses  CVS:  S1  S2, without murmur or gallop.   LUNG: Clear to auscultation, No wheezes, rales or rhonci.  BACK: No kyphosis of the thoracic spine. TLSO splinting in place. Tenderness in the left sciatic region.   ABDOMEN: Soft, nontender to palpation, with positive bowel sounds  EXTREMITIES: Good range of motion on both upper and lower extremities, no pedal edema, no calf tenderness  SKIN: Warm and dry, no rashes or erythema noted  NEUROLOGIC: Intact, pulses palpable  PSYCHIATRIC: Cognition intact            Labs:    Recent Results (from the past 240 hour(s))   Urinalysis   Result Value Ref Range    Color, UA Yellow Colorless, Yellow, Straw, Light Yellow    Clarity, UA Cloudy (!) Clear    Glucose, UA Negative Negative    Bilirubin, UA Negative Negative    Ketones, UA Negative Negative    Specific Gravity, UA 1.021 1.001 - 1.030    Blood, UA Small (!) Negative    pH, UA 5.5 4.5 - 8.0    Protein, UA 50 mg/dL (!) Negative mg/dL    Urobilinogen, UA <2.0 E.U./dL <2.0 E.U./dL, 2.0 E.U./dL    Nitrite, UA Positive (!) Negative    Leukocytes, UA Large (!) Negative    Bacteria, UA Many (!) None Seen hpf    RBC, UA 10-25 (!) None Seen, 0-2 hpf    WBC, UA >100 (!) None Seen, 0-5 hpf    Squam Epithel, UA 5-10 (!) None Seen, 0-5 lpf    WBC Clumps Present (!) None Seen    Mucus, UA Few (!) None Seen lpf   Culture, Urine   Result Value Ref Range    Culture  >100,000 col/ml Escherichia coli (!)        Susceptibility    Escherichia coli - JACQUELYN     Amoxicillin + Clavulanate 8/4 Sensitive      Ampicillin <=4 Sensitive      Cefazolin 2 Sensitive      Cefepime <=1 Sensitive      Ceftriaxone <=1 Sensitive      Ciprofloxacin <=0.5 Sensitive      Gentamicin <=2 Sensitive      Levofloxacin <=1 Sensitive      Meropenem <=0.25 Sensitive      Nitrofurantoin <=16 Sensitive      Tetracycline <=2 Sensitive      Tobramycin <=2 Sensitive      Trimethoprim + Sulfamethoxazole <=0.5 Sensitive    Hemoglobin   Result Value Ref Range    Hemoglobin 7.9 (L) 12.0 - 16.0 g/dL         Assessment/Plan:  1. Status post lumbar spinal fusion     2. Radiculopathy of lumbar region     3. Pain management     4. Iron deficiency anemia due to chronic blood loss     5. Acute cystitis with hematuria     6. Chronic kidney disease, stage III (moderate) (H)     7. Essential hypertension         Patient s/p lumbar fusion with hardware removal.  She continues with radiculopathy into the left lower extremity.  Recent  follow up with xrays of thoracic, lumbar and pelvis obtained. No acute abnormality. Degenerative changes of hips seen. Old T7 compression fracture. Lumbar spine surgery stable. I did increase her gabapentin to 100 mg BID daily and 300mg at HS. She also continues on  Dilaudid. She is not taking Valium so I will discontinue. She continues in TLSO splinting. She has a follow up with spine in 2 weeks.   Hypertension satisfactory controlled.  Chronic kidney disease stable. Recent UTI. She has completed her  Bactrim DS. Drop in her hemoglobin from 9.0 to 7.9. Asymptomatic. She is on iron supplement two times a day. Chronic iron deficiency anemia.  She did have a small amount of blood in her urine. I will d/c her ASA. This will need to be reviewed with her primary care provider. She will also need a follow up hemoglobin in 1 week. Guaiac X 1 positive.     Patient was discharged home with current meds and  treatments.  DC Valium.  Aspirin discontinued secondary to recent drop in hemoglobin.  This will need to be followed up on by her primary care provider.  Follow-up with primary care provider in 1 week.  She will go home with Dilaudid.  Home PT, OT, home health aide and RN.    DISCHARGE PLAN/FACE TO FACE:  I certify that this patient is under my care and that I, or a nurse practitioner or physician's assistant working with me, had a face-to-face encounter that meets the physician face-to-face encounter requirements with this patient.       I certify that, based on my findings, the following services are medically necessary home health services.     My clinical findings support the need for the above skilled services.     This patient is homebound because: Recent lumbar laminectomy.  Patient continues in TLSO splinting.    The patient is, or has been, under my care and I have initiated the establishment of the plan of care. This patient will be followed by a physician who will periodically review the plan of care.    Total time spent for this visit was 35 minutes with greater than 50% of time spent face-to-face patient reviewing above plan of care and discharge plans.        Electronically signed by: Mayi Clifford, CNP

## 2021-06-22 NOTE — PROGRESS NOTES
Susan Patrick is status post L4-S1 TF fusion on 11/9/2018.  Preoperatively presented with mechanical low back pain as well as left L5 radiculopathy.  Last seen on 11/26/2018 for staples removal.  Readdmitted on 12/24/2018 due to e-coli.  Today she returns in her 6 week post-op follow up with AP/Lat xrays. She is doing reasonably well - she reports improved back pain. Gets intermittent pain in her left hip associated with walking. Denies sensory disturbance in LE. Uses a cane for safety. Gait and balance are stable. In TLSO.  Tania York RN, CNRN

## 2021-06-22 NOTE — PROGRESS NOTES
Code Status:  FULL CODE  Visit Type: Problem Visit     Facility:  [facility] [[id number]]         Facility Type: SNF (Skilled Nursing Facility, TCU)    History of Present Illness: Susan Patrick is a 72 y.o. female who I am seeing today for follow-up on the TCU.  Patient with past medical history that includes peripheral vascular disease, CVA, coronary artery disease, depression, Barahona's esophagus, arthritis, anemia, GERD, hypertension, parathyroid adenoma and osteoporosis.  Patient with recent bilateral stress fractures in the L2 pedicles and pseudoarthrosis at L3-L4 and L4-5.  She has a history of lumbar fusion done in July 2012.  She was being treated out patiently with conservative measures however lucency surrounding her L5 screws progressed.  Patient status post L4-5 fusion and extension of the fusion to involve L5-S1 with hardware removal on 11/19/18.  Patient had postop urinary retention.  Continues with Bledsoe catheter.  Will attempt to remove this.  Patient complaining of lumbar pain with radiculopathy down into the left buttock and thigh.  She continues on Tylenol, Valium as needed, gabapentin and Dilaudid.  She tells me today that Dilaudid is not really working that she is taking the Valium which is somewhat helpful.  Chronic anemia.  Postoperative hemoglobin 9.1.    Today pt sitting up in bedside chair. She continues in TLSO. She continues with radiculopathy in the RLE. She complains today of increased pain. She also had a drop in her hemoglobin from 9.0 to 7.9. She continues on ASA 81 mg. She denies any blood in her stool however I will have them check it. She continues on Iron two times a day. Hx of iron deficiency anemia. She is being treated for a UTI with Bactrim DS. She denies any pain or dysuria. She is a-febrile. She is moving quite well ambulating with rolling walker independently.       Active Ambulatory Problems     Diagnosis Date Noted     Pseudoarthrosis of lumbar spine  04/05/2016     Low back pain 04/05/2016     Fracture of lumbar vertebra (H) 04/05/2016     Osteoporosis 04/05/2016     Vitamin D deficiency 04/26/2016     Scheuermann's kyphosis 07/19/2016     Non-traumatic compression fracture of T7 thoracic vertebra (H) 11/14/2016     Cigarette nicotine dependence in remission 11/14/2016     Acute pain of right hip 11/14/2016     Kyphosis of thoracic region 11/14/2016     Parathyroid adenoma 12/06/2016     Hypertension, goal below 140/80 12/30/2016     PVD (peripheral vascular disease) (H) 12/30/2016     CVA (cerebral vascular accident) (H) 12/30/2016     Barahona esophagus 12/30/2016     Major depression, recurrent (H) 12/30/2016     CKD (chronic kidney disease) stage 3, GFR 30-59 ml/min (H) 12/30/2016     Arthropathy of right hip 06/13/2017     Status post total hip replacement, right 10/06/2017     Cerebral infarction (H) 03/07/2012     Depression with anxiety 10/18/2011     Iron deficiency anemia 02/22/2016     Lactose intolerance 07/23/2013     Pain medication agreement 03/13/2015     History of colonic polyps 02/09/2012     S/P lumbar spinal fusion 12/05/2012     Lumbar radiculopathy 11/09/2018     Lumbar pseudoarthrosis      Postoperative anemia      Urinary retention with incomplete bladder emptying 11/26/2018     Resolved Ambulatory Problems     Diagnosis Date Noted     Flatback syndrome of lumbar region 04/05/2016     Past Medical History:   Diagnosis Date     Anemia      Anxiety      Arthritis      Barahona's esophagus      Cerebral aneurysm      Chronic kidney disease      Colon polyps      Coronary artery disease      Depression      GERD (gastroesophageal reflux disease)      History of blood clots      History of transfusion      Hyperlipidemia      Hypertension      Lactose intolerance      Lumbar vertebral fracture (H)      Osteoporosis      Parathyroid adenoma      PVD (peripheral vascular disease) (H)      Right hip pain      Stroke (H)      Vitamin D deficiency       Xerostomia        Current Outpatient Medications   Medication Sig Note     alendronate (FOSAMAX) 70 MG tablet Take 1 tablet (70 mg total) by mouth every 7 days. Take in the morning on an empty stomach with a full glass of water 30 minutes before food 11/9/2018: Takes every monday     amLODIPine (NORVASC) 10 MG tablet Take 10 mg by mouth daily.      aspirin 81 mg chewable tablet Chew 81 mg daily.      calcium carbonate-vitamin D3 600 mg(1,500mg) -800 unit per tablet Take 1 tablet by mouth daily.      cholecalciferol, vitamin D3, 1,000 unit tablet Take 3,000 Units by mouth daily.      clopidogrel (PLAVIX) 75 mg tablet Take 75 mg by mouth daily.      diazePAM (VALIUM) 5 MG tablet Take 0.5-1 tablets (2.5-5 mg total) by mouth every 6 (six) hours as needed (Muscle spasms.).      esomeprazole magnesium (NEXIUM 24 HR) 22.3 mg capsule Take 22.3 mg by mouth every morning before breakfast.      ferrous sulfate 325 (65 FE) MG tablet Take 1 tablet by mouth twice a day with lunch and supper.       gabapentin (NEURONTIN) 100 MG capsule Take 100 mg by mouth 3 (three) times a day. (Patient taking differently: Take 200 mg by mouth 3 (three) times a day .      )      hydroCHLOROthiazide (HYDRODIURIL) 25 MG tablet Take 25 mg by mouth daily.      HYDROmorphone (DILAUDID) 2 MG tablet Take 1 tablet (2 mg total) by mouth every 4 (four) hours as needed.      lisinopril (PRINIVIL,ZESTRIL) 20 MG tablet Take 20 mg by mouth daily.      mirtazapine (REMERON) 30 MG tablet Take 30 mg by mouth bedtime.      MULTIVITAMIN ORAL Take 1 tablet by mouth daily.       pravastatin (PRAVACHOL) 40 MG tablet Take 40 mg by mouth bedtime.      sertraline (ZOLOFT) 100 MG tablet Take 150 mg by mouth daily.         Allergies   Allergen Reactions     Lactose Other (See Comments)     Diarrhea; Lactose intolerant     Metoprolol Unknown     Bradycardia, heart rate in 30's      Percocet [Oxycodone-Acetaminophen] Other (See Comments)     hallucinations     Augmentin  [Amoxicillin-Pot Clavulanate] Rash and Hives     Caused sores         Review of Systems   No fevers or chills. No headache, lightheadedness or dizziness. No SOB, chest pains or palpitations. Appetite is good. No nausea, vomiting, constipation or diarrhea. No dysuria, frequency, burning or pain with urination. Pt reports pain in her left lower back radiating to her left buttock and down her left leg.        Physical Exam   PHYSICAL EXAMINATION:  Vital signs: /63, P 85, R 16, T96.8, O2 sat 97%.  Current weight 141.2 pounds.  General: Awake, Alert, oriented x3, appropriately, follows simple commands, conversant  HEENT:PERRLA, Pink conjunctiva, anicteric sclerae, moist oral mucosa  NECK: Supple, without any lymphadenopathy, or masses  CVS:  S1  S2, without murmur or gallop.   LUNG: Clear to auscultation, No wheezes, rales or rhonci.  BACK: No kyphosis of the thoracic spine. TLSO splinting in place. Tenderness in the left sciatic region.   ABDOMEN: Soft, nontender to palpation, with positive bowel sounds  EXTREMITIES: Good range of motion on both upper and lower extremities, no pedal edema, no calf tenderness  SKIN: Warm and dry, no rashes or erythema noted  NEUROLOGIC: Intact, pulses palpable  PSYCHIATRIC: Cognition intact            Labs:    Recent Results (from the past 240 hour(s))   Urinalysis   Result Value Ref Range    Color, UA Yellow Colorless, Yellow, Straw, Light Yellow    Clarity, UA Cloudy (!) Clear    Glucose, UA Negative Negative    Bilirubin, UA Negative Negative    Ketones, UA Negative Negative    Specific Gravity, UA 1.021 1.001 - 1.030    Blood, UA Small (!) Negative    pH, UA 5.5 4.5 - 8.0    Protein, UA 50 mg/dL (!) Negative mg/dL    Urobilinogen, UA <2.0 E.U./dL <2.0 E.U./dL, 2.0 E.U./dL    Nitrite, UA Positive (!) Negative    Leukocytes, UA Large (!) Negative    Bacteria, UA Many (!) None Seen hpf    RBC, UA 10-25 (!) None Seen, 0-2 hpf    WBC, UA >100 (!) None Seen, 0-5 hpf    Squam Epithel,  UA 5-10 (!) None Seen, 0-5 lpf    WBC Clumps Present (!) None Seen    Mucus, UA Few (!) None Seen lpf   Culture, Urine   Result Value Ref Range    Culture >100,000 col/ml Escherichia coli (!)        Susceptibility    Escherichia coli - JACQUELYN     Amoxicillin + Clavulanate 8/4 Sensitive      Ampicillin <=4 Sensitive      Cefazolin 2 Sensitive      Cefepime <=1 Sensitive      Ceftriaxone <=1 Sensitive      Ciprofloxacin <=0.5 Sensitive      Gentamicin <=2 Sensitive      Levofloxacin <=1 Sensitive      Meropenem <=0.25 Sensitive      Nitrofurantoin <=16 Sensitive      Tetracycline <=2 Sensitive      Tobramycin <=2 Sensitive      Trimethoprim + Sulfamethoxazole <=0.5 Sensitive    Hemoglobin   Result Value Ref Range    Hemoglobin 7.9 (L) 12.0 - 16.0 g/dL         Assessment/Plan:  1. Status post lumbar spinal fusion     2. Radiculopathy of lumbar region     3. Pain management     4. Iron deficiency anemia due to chronic blood loss     5. Acute cystitis with hematuria     6. Chronic kidney disease, stage III (moderate) (H)         Patient s/p lumbar fusion with hardware removal.  She continues with radiculopathy into the left lower extremity with increased pain today. I will follow up with xrays of thoracic, lumbar and pelvis.  Currently on gabapentin 100 mg BID daily and 300mg at HS. I recently increased the HS dose to 200 mg. I will increase to 300mg at HS. Continue 100mg in the am and mid day. She also continues on Valium as well as Dilaudid. She continues in TLSO splinting.  Hypertension satisfactory controlled.  Chronic kidney disease stable. Recent UTI. She continues on Bactrim DS. Drop in her hemoglobin from 9.0 to 7.9. Asymptomatic. She is on iron supplement two times a day. Chronic iron deficiency anemia. I will also guiac her stools X 3. She did have a small amount of blood in her urine.       Electronically signed by: Mayi Clifford, MOHSEN

## 2021-06-22 NOTE — TELEPHONE ENCOUNTER
Called and let Susan know that Dr. Miranda spoke with an infectious disease specialist and he feels that the treatment she received while inpatient was appropriate but in light of her recent hardware placement there are some labs that he agrees we should be monitoring.     Dr Miranda have ordered these labs and Susan can have them completed at the most convenient [facility] location when it fits her schedule- preferably within the next week.  Susan verbalized agreement.  Tania York RN, CNRN

## 2021-06-22 NOTE — PROGRESS NOTES
Neurosurgery Progress Note  01/03/19    A/P: Susan Patrick is a 73 y.o. female with a past medical history significant for osteoporosis, PSH sig for L3-5 instrumented fusion with L4-5 pseudoarthrosis who developed a left L5 lumbar radiculopathy, now sp L4-5, L5-S1 TLIF with revision of prior hardware    Fu labs with ESR, CRP, repeat CBC and UA in light of pt recent hardware placement and worsening low back pain  Prescribed gabapentin and valium for recurrent radiculopathy and muscle spasm  Fu 6 weeks with repeat xrays    S: Susan notes a feeling of significant pressure that is localized primarily above the buttocks and around the tailbone.  She notes that it has been ongoing for the last week and a half, primarily since she was discharged from the hospital.  She notes it does interfere with her sleep.  She states she does not believe that this was present earlier in her recovery after surgery.  She notes that when she came in for her initial wound evaluation he was ambulating, even without the assistance of a cane.  However with this new pressure sensation, she notes this is aggravated with walking and when moving from a seated to a standing position.  She denies any associated numbness or tingling.  She denies any focal weakness but overall feels generally fatigued since her hospitalization for bacteremia.  She continues to use her TLSO brace.  She does note that the pressure in the low back will radiate into the buttock down the lateral aspect of the left thigh into the lateral calf, but not into the foot.    PMH: E. Coli UTI and sepsis with hypokalemia requiring hospital admission 12/16-12/20/2018. Also noted to have anemia, cont on supplemental iron  PSH: No interval change    ROS:  A full 14 point review of systems was otherwise completed and is negative aside from that mentioned above in the HPI    O:  Vitals:    01/03/19 1439   BP: 110/68   Pulse: 72   Resp: 18     General: Awake, alert, NAD  HEENT: AT/NC,  EOMI, face symmetric, oral mucosa moist and pink  Heart: RRR  Lungs: Nonlabored respirations without accessory muscle use.  Abd: S, NT, ND  Neuro: Awake, alert, speech is clear and content is appropriate. MAEW x 4 with full strength in b/l LE with the exception of some mild EHL weakness worse on the left than the right. Sensation is intact to temperature and LT. There is absent vibratory sense in the left great toe.  Coordination: Gait is antalgic.  Reflexes: 3+ patellar, 2+ Achilles bilaterally. Toes downgoing bilaterally.  Musculoskeletal: Negative straight leg raise bilaterally.  Psych: Appropriate mood and affect, pleasant, cooperative, alert and oriented x 3  Skin: Incision C/D/I    I personally reviewed and visualized the following radiographic images:  Lumbar spine XR 1/3/2019: No evidence of interval hardware complication    Kaila Miranda MD  01/03/19

## 2021-06-23 NOTE — TELEPHONE ENCOUNTER
Dr. Miranda called and spoke with pt. She told pt to call us if she felt she was getting worse and we could order a new CT scan. Pt understood.   CONOR Rodrigez

## 2021-06-23 NOTE — PROGRESS NOTES
[facility]  ENDOCRINOLOGY    Osteoporosis Follow Up 2019    Susan Patrick, 1945, [id number]          Reason for visit      1. Senile osteoporosis        History     Susan Patrick is a very pleasant 73 y.o. old female who presents for follow up.   SUMMARY:  Diagnosed with osteoporosis  In .   DXA scan is noted below indicating a lowest T-score of -2.7 at the left femoral neck. Started on reclast after this DXA scan- no prior DXA scan at this facility is 1 from [employer] noted below..   Current and prior treatments include Reclast x2 doses  Risk factors include age, gender, , early menopause, former smoker  The following high- risk conditions have been ruled out: celiac disease, eating disorders, gastric bypass, hyperparathyroidism, inflammatory bowel disease, hyperthyroidism, rheumatoid arthritis, lupus, chronic kidney disease.  She is not on high risk medications such as glucocorticoids, anti-coagulants, anti-convulsants, chemotherapy or levothyroxine.   Personal or family history of kidney stones is negative.  Family history of osteoporosis is Unknown- mother  young-47    Calcium intake 650mg calcium supplements;16oz of milk daily as well. Occasional cheese and yogurt- 4x/week  Vitamin D intake 4000 IU D3 ( 2x 2000IU daily)  Weight bearing exercise Unable  Denied tobacco use- former smoker quit in .   Denies alcohol use  There is a stress fracture across the L2 pedicles and she is here to determine next steps in treatment.  Back surgery was in .  In 2014 she saw Dr. Denise at [facility] and was advised to start on treatment but did not do so immediately.  Prior to that she saw another physician Dr. Stout who recommended Fosamax in  as osteoporosis had already been diagnosed.  She therefore went untreated for at least 2 years.  She does not give a particular reason for not taking medications.  They lumbar fracture appears to have been atraumatic.  She's not aware of any  "family history of osteoporosis.  She has received at least one cortisone injection through Hospital in her back.  She also has right knee arthritis which has been giving her limitations in mobility.  Prior fractures in 2013 include her falling off a step and fracturing her wrist in her right hip.    TODAY:  Susan returns today in f/u for Osteoporosis.  She has now been on Alendronate since last August, after completing 2 years of Forteo. Last Dexa scan of 8/18, shows that she responded to the Forteo positively.  She is having no problems with the Alendronate.  She reports that she has had surgery done on her back last Fall and was in TCU during Thanksgiving.  She is in a TLSO at present for this.  She was told that things are \"looking good\".  She then got Septic with E-coli and missed [place].  Current Vit D level is good at 45.5 and Calcium, while within range, is on the low end at 8.9. She does not do any regular walking.    Risk Factors     The following high- risk conditions have been ruled out: celiac disease, eating disorders, gastric bypass, hyperparathyroidism, inflammatory bowel disease, hyperthyroidism, rheumatoid arthritis, lupus, chronic kidney disease.     Susan Patrick has the following risk factors: Age, Female gender,  and Low BMI, hx of smoking     She is not on high risk medications such as glucocorticoids, anti-coagulants, anti-convulsants, chemotherapy or levothyroxine.    Patient deniesBreast cancer and Family history of breast cancer.     Menopause was at age: 33 years.  Hysterectomy at age 30 but no ovariectomy.No HRT.  Height loss of 4 inches      Past Medical History     Patient Active Problem List   Diagnosis     Pseudoarthrosis of lumbar spine     Low back pain     Fracture of lumbar vertebra (H)     Vitamin D deficiency     Scheuermann's kyphosis     Non-traumatic compression fracture of T7 thoracic vertebra (H)     Cigarette nicotine dependence in remission     Acute pain of " "right hip     Kyphosis of thoracic region     Parathyroid adenoma     Hypertension, goal below 140/80     PVD (peripheral vascular disease) (H)     CVA (cerebral vascular accident) (H)     [place] esophagus     Major depression, recurrent (H)     CKD (chronic kidney disease) stage 3, GFR 30-59 ml/min (H)     Arthropathy of right hip     Status post total hip replacement, right     Cerebral infarction (H)     Depression with anxiety     Iron deficiency anemia     Lactose intolerance     Pain medication agreement     History of colonic polyps     S/P lumbar spinal fusion     Lumbar radiculopathy     Lumbar pseudoarthrosis     Postoperative anemia     Urinary retention with incomplete bladder emptying     Severe sepsis (H)     Acute cystitis without hematuria     Hypokalemia     LADARIUS (acute kidney injury) (H)     Bacteremia due to Escherichia coli     CAD (coronary artery disease)     Pseudophakia, both eyes     Senile osteoporosis     Ureteral stone     Xerostomia       Family History       family history includes Cerebral aneurysm in her sister.    Social History      reports that she quit smoking about 13 years ago. She has a 7.50 pack-year smoking history. she has never used smokeless tobacco. She reports that she drinks alcohol. She reports that she does not use drugs.      Review of Systems     Patient denies current pain, limited mobility, fractures.   Remainder per HPI.      Vital Signs     /66   Ht 5' 2\" (1.575 m)   Wt 141 lb 4.8 oz (64.1 kg)   BMI 25.84 kg/m      Physical Exam     GENERAL:  Normal, NIRD  EYES:  Pupils equal, round and reactive to light; no proptosis, lid lag or  periorbital edema. Photophobia  THYROID:  Thyroid is normal.  No tenderness or bruit  NECK: No lymph nodes  MUSCULOSKELETAL: No joint abnormalities, FROM in all four extremities. No kyphosis. Muscle strength grossly normal without evidence of wasting. Fingers with clubbing.  HEART:  Regular rate and rhythm without " murmur.  LUNGS:  Clear to auscultation.  ABDOMEN:Soft, non-tender, no masses or organomegaly  NEURO:  Patella Reflexes were normal.No tremors  SKIN:  No acanthosis nigricans or vitiligo        Assessment     1. Senile osteoporosis        Plan     Per Dexa Scan, pt is stable on the Alendronate and will remain on it going forward.  We will get another Dexa done next year.  I will see her back next year.  She will continue on her supplementation.        Total visit minutes:25  Time spent counseling and coordination of care:23    Lena Friedman   Endocrinology  2/7/2019  12:36 PM      Current Medications     Outpatient Medications Prior to Visit   Medication Sig Dispense Refill     acetaminophen (TYLENOL) 325 MG tablet Take 650 mg by mouth every 6 (six) hours as needed for pain.       amLODIPine (NORVASC) 10 MG tablet Take 10 mg by mouth daily.       aspirin 81 mg chewable tablet Chew 81 mg daily.       calcium carbonate-vitamin D3 600 mg(1,500mg) -800 unit per tablet Take 1 tablet by mouth daily.       cholecalciferol, vitamin D3, 1,000 unit tablet Take 3,000 Units by mouth daily.       clopidogrel (PLAVIX) 75 mg tablet Take 75 mg by mouth daily.       diazePAM (VALIUM) 2 MG tablet Take 0.5-1 tablets (1-2 mg total) by mouth every 8 (eight) hours as needed (muscle relaxation). 30 tablet 0     esomeprazole magnesium (NEXIUM 24 HR) 22.3 mg capsule Take 22.3 mg by mouth every morning before breakfast.       ferrous sulfate 325 (65 FE) MG tablet Take 1 tablet by mouth twice a day with lunch and supper.        gabapentin (NEURONTIN) 100 MG capsule Take 1 tablet (100mg) by mouth at bedtime for 5 days, then take 1 tablet twice daily for 5 days, then take 1 tablet three times daily (Patient taking differently: Tale 1 tab in the am, 1 tab in the afternoon and 2 tabs at bedtime      ) 60 capsule 1     HYDROmorphone (DILAUDID) 2 MG tablet Take 1-2 tablets (2-4 mg total) by mouth every 6 (six) hours as needed for pain. 40 tablet  0     lisinopril-hydrochlorothiazide (PRINZIDE,ZESTORETIC) 20-25 mg per tablet Take 1 tablet by mouth daily.  1     mirtazapine (REMERON) 30 MG tablet Take 30 mg by mouth bedtime.       MULTIVITAMIN ORAL Take 1 tablet by mouth daily.        pravastatin (PRAVACHOL) 40 MG tablet Take 40 mg by mouth bedtime.       sertraline (ZOLOFT) 100 MG tablet Take 150 mg by mouth daily.        alendronate (FOSAMAX) 70 MG tablet Take 1 tablet (70 mg total) by mouth every 7 days. Take in the morning on an empty stomach with a full glass of water 30 minutes before food 12 tablet 3     No facility-administered medications prior to visit.          Lab Results     PTH   Date Value Ref Range Status   12/30/2016 23 10 - 86 pg/mL Final     Calcium   Date Value Ref Range Status   12/20/2018 8.9 8.5 - 10.5 mg/dL Final     Phosphorus   Date Value Ref Range Status   10/10/2017 2.0 (L) 2.5 - 4.5 mg/dL Final           Imaging Results   Last DEXA scan:  Results for orders placed in visit on 08/31/18   DXA Bone Density Scan    Narrative 8/31/2018      RE: Susan Patrick  YOB: 1945        Dear Kaila Miranda,    Patient Profile:  72 y.o. female, postmenopausal, is here for the follow up bone density   test.   History of fractures - Yes;  Wrist, Lumbar vertebrae and Hip. Family   history of osteoporosis - None.  Family history of hip fracture: None.   Smoking history - Past. Osteoporosis treatment past -  Yes;  Forteo.   Osteoporosis treatment current - Yes;  Bisphosphonates.  Chronic medical   problems - Height loss, Hip replacement , History of kidney stones,   Hysterectomy, Premature menopause and Spine surgery.  Also   hyperparathyroidism high risk medications -  None.    Assessment:    1. The spine bone density is best assessed using L1-L2 with T-score of   -0.1..  2. Femoral bone densities show osteoporosis with a left femoral neck T-   score of -2.7.  Bone density cannot be assessed on the right due to   previous right hip  fracture with surgical repair  3.  Bone density was also checked in the wrist since the spine is of   suboptimal reliability and since the right hip could not be assessed.  In   the left 33% radius, low bone mass is seen with T score of -1.7.  4.  Since the scan in 2017, bone density has increased a significant 5.8%   in the left total hip.   Bone density has not significantly changed at   L1-L2 of the AP spine.    72 y.o. female with OSTEOPOROSIS which would be judged as severe given her   previous fracture history.  BMD changes since 2016 reflect a good response   to current management.    Recommendations:  A continuation of current management would be advised with a recheck in 2   years.      Bone densitometry was performed on your patient using our XLerant   densitometer. The results are summarized and a copy of the actual scans   are included for your review. In conformity with the International Society   of Clinical Densitometry's most recent position statement for DXA   interpretation (2015), the diagnosis will be made on the lowest measured   T-score of the lumbar spine, femoral neck, total proximal femur or 33%   radius. Note the change in terminology for diagnostic classification from   OSTEOPENIA to LOW BONE MASS. All trending for sequential exams will be   done using multiple vertebrae or the total proximal femur. Fracture risk   is based on the WHO Fracture Risk Assessment Tool (FRAX). If additional   information is needed or if you would like to discuss the results, please   do not hesitate to call me.       Thank you for referring this patient to [facility].   We are happy to be of service in support of you and your practice. If you   have any questions or suggestions to improve our service, please call me   at [phone].     Sincerely,     Willis Connors M.D. C.CYVETTE.  Osteoporosis Services, [facility]

## 2021-06-23 NOTE — TELEPHONE ENCOUNTER
Please call patient with lab results. Also would like to discuss getting a new Rx as valium is not helping her. [phone]

## 2021-06-23 NOTE — TELEPHONE ENCOUNTER
Contacted Susan re: labs, symptoms    Will repeat ESR/CRP in 1-2 weeks as they were elevated previously. Her UA shows contamination, no UTI. Her PCP is repeating this in light of urinary frequency.    Continues to have left leg pain which is improved with gabapentin- shes on her third week of her taper- I noted that if shes not having side effects, she can increase her gabapentin to 100mg Qam and Qnoon and 200mg at bedtime- she can start that tomorrow.    I have also provided her with a prescription of dilaudid to see if this helps with her back pressure. She notes the valium was not of benefit. She can increase the valium to 4mg PO Q6hrs PRN muscle spasm and this was reviewed as well.    Pt in agreement with the plan. I offered repeat lumbar spine CT but she is not interested at this point in time. She will call if worsening and then we can perform CT lumbar spine without IV contrast.    Kaila Miranda MD  01/15/19

## 2021-06-23 NOTE — TELEPHONE ENCOUNTER
Patient calling because she spoke with a nurse on Friday and they told her we should know more by today. She would like the results of her lab work and also has a question regarding a medication. Please call her at [phone]

## 2021-06-23 NOTE — TELEPHONE ENCOUNTER
Called and let Susan know that Dr. Miranda saw her lab results for her inflammatory markers and they are still elevated but they are trending down which is good. Susan will repeat ESR/CRP again in another 4 weeks.  Tania York RN, CNRN

## 2021-06-24 NOTE — PROGRESS NOTES
"Neurosurgery Progress Note  02/28/19    A/P: Susan Patrick is a 73 y.o. female with a PMH sig for osteoporosis (completed Forteo now on fosamax) sp revision of prior L3-5 instrumented fusion with L4-5 pseudoarthrosis with subsequent L4-5, L5-S1 instrumented TLIF on 11/9/2018    Brace as needed  ESR and CRP have normalized, no need to repeat labs any further  Represcribe the gabapentin and hydrocodone for breakthrough pain.   Fu 6 weeks to reassess pain    S: Continues to have \"terrible pressure\" in the low back. It is just below the tailbone and above the buttocks. It gets worse as the day goes on. Notices it when she first gets out of bed in the morning and starts walking. Now that she's off the gabapentin, her radiculopathy has partially returned with pain down the left lateral thigh. She has been off of all pain medication and narcotics for at least three weeks. States the pressure sensation feels new compared to before surgery. Heating pad helps with the pressure- uses mostly at night to help with sleep. The brace helps somewhat. Tylenol is unhelpful    PMH: No interval change  PSH: No interval change    ROS: Denies any fevers, chills, nausea, vomiting. Notes a generalized fatigue. Notes feeling stressed and states this may be contributing to her pain. A full 14 point review of systems was otherwise completed and is negative aside from that mentioned above in the HPI    O:  Vitals:    02/28/19 1316   BP: 135/62   Pulse: 81   Resp: 16   SpO2: 94%     General: Awake, alert, NAD  HEENT: AT/NC, EOMI, face symmetric, oral mucosa moist and pink  Heart: RRR  Lungs: Nonlabored respirations without accessory muscle use.  Neuro: Awake, alert, speech is clear and content is appropriate. MAEW x 4 with full strength in b/l UE and LE. Sensation is intact to temperature and LT. Vibratory sense is intact in the bl great toe.  Coordination: Gait is antalgic  Reflexes: No clonus. Toes downgoing bilaterally.  Musculoskeletal: " Negative straight leg raise bl  Psych: Appropriate mood and affect, pleasant, cooperative, alert and oriented x 3  Skin: No obvious rash or lesion    I personally reviewed and visualized the following radiographic images:  XR lumbar spine 2/28/2019: No evidence of hardware lucency or complication. Stable L3-S1 hardware placement    Kaila Miranda MD  02/28/19

## 2021-06-24 NOTE — TELEPHONE ENCOUNTER
Called and let jeanie know that her labs, the markers for inflammation, had gone back down to completely normal. We dont need to repeat them any further. She verbalized understanding and satisfaction.  Tania York RN, CNRN

## 2021-06-25 NOTE — TELEPHONE ENCOUNTER
Sharon called today about her Gabapentin Rx from Dr. Walsh. She called to refill her prescription and was unable to get it filled because the dosing instructions were not in sync with her running out so soon. She stated she talked with her pharmacy and they were sending a message to  but she is concerned about running out. Please call Susan at [phone]

## 2021-07-12 NOTE — PROGRESS NOTES
Progress Notes by Kaila Walsh MD at 3/19/2021  1:00 PM     Author: Kaila Walsh MD Service: -- Author Type: Physician    Filed: 7/11/2021 10:22 PM Encounter Date: 3/19/2021 Status: Signed    : Kaila Walsh MD (Physician)         Assessment:   Susan Patrick is a 74 y.o. female with a past medical history significant for osteoporosis (completed Forteo, now on Fosamax) status post revision of a prior L3-5 instrumented fusion with L4-5 pseudoarthrosis with subsequent L4-5, L5-S1 instrumented TLIF on 11/9/2018     Plan:   Ok for knee surgery- no restrictions from a lumbar spine standpoint  Susan had evidence of pseudoarthrosis with screw lucency noted on her imaging at our last visit but her symptoms have not progressed, her back pain is stable. Given her positive sagittal balance, if she requires further revision, I think she may require deformity correction. I recommended evaluation at the [facility] and she is in agreement with this plan.    Subjective:   Susan Patrick is a 75 y.o. female who submitted an eVisit request for evaluation of her Follow-up (Discuss knee surgery ).  See the questionnaire and message section of encounter report for information related to history of present illness and review of systems.    Knees were bothering her but then she woke up one morning and her left leg was swollen to 3 times the normal size. She was seen at the ER and she was found to have a popliteal baker's cyst and then swelling went down. She notes she hasnt had nothing but trouble since then- she had cortisone and rooster cone injections which have not been of benefit. She underwent xrays that also show bone on bone - the right knee will click and feels like it is going to give out on her.   She wants to make sure the left knee is back to baseline   Pain in the low back, just above the buttocks and just below the waist- and it would radiate into the right leg.   Pain that radiates into  the hips, worse on the right than the left and then it goes down the lateral aspect of the thigh. Denies any pain on the anterior aspect of the thigh    The following portions of the patient's history were reviewed and updated as appropriate:  She does not have any pertinent problems on file.  She is allergic to lactose; metoprolol; percocet [oxycodone-acetaminophen]; and augmentin [amoxicillin-pot clavulanate]..     Objective:   No exam performed today, patient submitted as eVisit.    Kaila Walsh MD

## 2021-08-12 NOTE — PROGRESS NOTES
Progress Notes by Kaila Walsh MD at 7/6/2020  1:00 PM     Author: Kaila Walsh MD Service: -- Author Type: Physician    Filed: 8/11/2021 10:21 PM Encounter Date: 7/6/2020 Status: Signed    : Kaila Walsh MD (Physician)       Neurosurgery Progress Note  07/06/20    A/P: Susan Patrick is a 74 y.o. 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 The knees will feel swollen and she will get a fatigue sensation in the legs. Only able to walk about half a block. Less than a month ago, she bent over to tie her shoe and noted worsening back pain. Just above the buttocks and at the waist is getting worsening pain. Worse with walking, standing, bending, and after prolonged sitting it will feel like it is worse when she then tries to stand up.   Numbness, tingling: Denies  Pain management: tylenol PRN, gabapentin 200mg TID    PMH: No interval change  PSH: No interval change    ROS: Denies changes in bowel or bladder. Denies radicular pain in the lower extremities.. A full 14 point review of systems was otherwise completed and is negative aside from that mentioned above in the HPI    O:  Vitals:    07/06/20 1312   BP: 157/58   Pulse: 73   SpO2: 98%     General: Awake, alert, NAD  HEENT: AT/NC, EOMI, face symmetric, oral mucosa moist and pink  Heart: RRR  Lungs: Nonlabored respirations without accessory muscle use.  Neuro: Awake, alert, speech is clear and content is appropriate. MAEW x 4 with full strength in b/l LE.Sensation is intact to temperature and  LT. Vibratory sense is absent in the left great toe  Reflexes: 3+ bl LE deep tendon reflexes. 2 beats of clonus. Toes downgoing bilaterally.  Musculoskeletal: Negative straight leg raise bl  Psych: Appropriate mood and affect, pleasant, cooperative, alert and oriented x 3  Skin: Incision C/D/I    Kaila Walsh MD

## 2021-08-12 NOTE — PROGRESS NOTES
Progress Notes by Kaila Walsh MD at 7/22/2020 10:30 AM     Author: Kaila Walsh MD Service: -- Author Type: Physician    Filed: 8/11/2021 10:25 PM Encounter Date: 7/22/2020 Status: Signed    : Kaila Walsh MD (Physician)       Neurosurgery Progress Note  07/22/20    A/P: Susan Patrick is a 74 y.o. female with a past medical history significant for osteoporosis (completed Forteo, now on Fosamax) status post revision of a prior L3-5 instrumented fusion with L4-5 pseudoarthrosis with subsequent L4-5, L5-S1 instrumented TLIF on 11/9/2018    I reviewed Susan's most recent imaging with her today in clinic. In light of her positive sagittal imbalance and pseudoarthrosis, I noted that she may be best served by a deformity specialist. I would like to contact their department to see if she would be considered a candidate however in light of her osteoporosis    S: Continues to get a fatigue sensation in the legs bilaterally, still worse when going up stairs. Denies numbness, tingling, weakness. Average back pain 5/10; at its worst 8/10. Worst is occurring a few times a week. If it severely bothers she will go lay down for a while.    O:  Vitals:    07/22/20 1025   BP: 111/55   Pulse: 60   Resp: 16   SpO2: 100%     General: Awake, alert, NAD  HEENT: AT/NC, EOMI, face symmetric, oral mucosa moist and pink  Heart: RRR  Lungs: Nonlabored respirations without accessory muscle use.  Neuro: Awake, alert, speech is clear and content is appropriate. MAEW x 4 with full strength in b/l LE. Sensation is intact to temperature and LT. Vibratory sense is intact in the bl great toe  Musculoskeletal: Negative straight leg raise bl  Psych: Appropriate mood and affect, pleasant, cooperative, alert and oriented x 3  Skin: Incision C/D/I    Kaila Walsh MD

## 2021-08-12 NOTE — PROGRESS NOTES
Progress Notes by Mirella Mitchell at 7/6/2020  1:00 PM     Author: Mirella Mitchell Service: -- Author Type: Patient Access    Filed: 8/11/2021 10:21 PM Encounter Date: 7/6/2020 Status: Signed    : Mirella Mitchell (Patient Access)       Susan is here to f/u on lumbar CT. She states she still has the low back pain with no radicular pain. She also now c/o a heaviness feeling in both her knees with walking.   Rain,CMA

## 2021-10-06 ENCOUNTER — TELEPHONE (OUTPATIENT)
Dept: NEUROSURGERY | Facility: CLINIC | Age: 76
End: 2021-10-06

## 2021-10-06 DIAGNOSIS — M40.204 KYPHOSIS OF THORACIC REGION: ICD-10-CM

## 2021-10-06 DIAGNOSIS — M47.26 OSTEOARTHRITIS OF SPINE WITH RADICULOPATHY, LUMBAR REGION: ICD-10-CM

## 2021-10-06 DIAGNOSIS — S32.009K PSEUDOARTHROSIS OF LUMBAR SPINE: Primary | ICD-10-CM

## 2021-10-06 NOTE — TELEPHONE ENCOUNTER
Patient called to schedule with Dr. Walsh. Last note from Dr. Walsh indicates patient may need spinal deformity surgery at the [facility]. Please advise if I should schedule f/u appt with Dr. Walsh or if referral to [facility] will be entered instead. Please let me know if any additional imaging is needed as well.    Patient reports 'it really hurts when I bend down and stand up'.    Susan can be reached at [phone].

## 2021-10-07 NOTE — TELEPHONE ENCOUNTER
10/7/21 Attempted to reach Susan but number on file is not in service. Sending referral to [facility].

## 2021-10-11 ENCOUNTER — TELEPHONE (OUTPATIENT)
Dept: NEUROSURGERY | Facility: CLINIC | Age: 76
End: 2021-10-11

## 2021-10-12 NOTE — TELEPHONE ENCOUNTER
SPINE PATIENTS - NEW PROTOCOL PREVISIT    RECORDS RECEIVED FROM: Internal   REASON FOR VISIT: Pseudoarthrosis of lumbar spine, Osteoarthritis of spine with radiculopathy, lumbar region, Kyphosis of thoracic region    Date of Appt: 10/21/21   NOTES (FOR ALL VISITS) STATUS DETAILS   OFFICE NOTE from referring provider Internal Dr Kaila Walsh @ [facility] Neurosurgery:  3/19/21   OFFICE NOTE from other specialist N/A    DISCHARGE SUMMARY from hospital N/A    DISCHARGE REPORT from ER N/A    EMG REPORT N/A    MEDICATION LIST Internal    IMAGING  (FOR ALL VISITS)     MRI (HEAD, NECK, SPINE) [facility]:  MRI Lumbar Spine 7/15/20   XRAY (SPINE) *NEUROSURGERY* [facility]:  XR Spine Complete 7/15/20   CT (HEAD, NECK, SPINE) [facility]:  CT Lumbar Spine 7/2/20

## 2021-10-14 DIAGNOSIS — M41.9 SCOLIOSIS: Primary | ICD-10-CM

## 2021-10-21 ENCOUNTER — OFFICE VISIT (OUTPATIENT)
Dept: NEUROSURGERY | Facility: CLINIC | Age: 76
End: 2021-10-21
Attending: NEUROLOGICAL SURGERY
Payer: MEDICARE

## 2021-10-21 ENCOUNTER — PRE VISIT (OUTPATIENT)
Dept: NEUROSURGERY | Facility: CLINIC | Age: 76
End: 2021-10-21

## 2021-10-21 ENCOUNTER — ANCILLARY PROCEDURE (OUTPATIENT)
Dept: GENERAL RADIOLOGY | Facility: CLINIC | Age: 76
End: 2021-10-21
Attending: NEUROLOGICAL SURGERY
Payer: MEDICARE

## 2021-10-21 VITALS
HEART RATE: 64 BPM | HEIGHT: 62 IN | RESPIRATION RATE: 16 BRPM | SYSTOLIC BLOOD PRESSURE: 154 MMHG | DIASTOLIC BLOOD PRESSURE: 85 MMHG | OXYGEN SATURATION: 95 % | BODY MASS INDEX: 25.03 KG/M2 | WEIGHT: 136 LBS

## 2021-10-21 DIAGNOSIS — M47.26 OSTEOARTHRITIS OF SPINE WITH RADICULOPATHY, LUMBAR REGION: ICD-10-CM

## 2021-10-21 DIAGNOSIS — M41.9 SCOLIOSIS: ICD-10-CM

## 2021-10-21 DIAGNOSIS — M40.14 OTHER SECONDARY KYPHOSIS, THORACIC REGION: ICD-10-CM

## 2021-10-21 DIAGNOSIS — S32.009K PSEUDOARTHROSIS OF LUMBAR SPINE: ICD-10-CM

## 2021-10-21 PROCEDURE — 99214 OFFICE O/P EST MOD 30 MIN: CPT | Performed by: NEUROLOGICAL SURGERY

## 2021-10-21 PROCEDURE — 72082 X-RAY EXAM ENTIRE SPI 2/3 VW: CPT | Performed by: STUDENT IN AN ORGANIZED HEALTH CARE EDUCATION/TRAINING PROGRAM

## 2021-10-21 PROCEDURE — 77073 BONE LENGTH STUDIES: CPT | Performed by: STUDENT IN AN ORGANIZED HEALTH CARE EDUCATION/TRAINING PROGRAM

## 2021-10-21 ASSESSMENT — MIFFLIN-ST. JEOR: SCORE: 1055.14

## 2021-10-21 ASSESSMENT — PAIN SCALES - GENERAL: PAINLEVEL: MODERATE PAIN (5)

## 2021-10-21 NOTE — PROGRESS NOTES
Neurosurgery Clinic Note    Chief Complaint: thoracic back pain    History of Present Illness:  It was a pleasure to evaluate Susan Patrick in clinic today at the kind referral of Kaila Walsh MD  [address] [place] [address].    Susan Patrick is a 75 year old female whom I have not seen since 2017. I have never performed surgery on the patient.    In 2016, I saw her for L2 osteoporotic pedicle fractures, pseudoarthrosis of prior L3-L5 instrumented fusion by Dr. Dumont; I made diagnosis of osteoporosis after I ordered DEXA scan, I had gotten her started on Forteo in August 2016 and gotten her to stop smoking, I diagnosed a parathyroid adenoma and referred her to Dr. Snowden for resection which was performed in 2016, I diagnosed right hip arthropathy and referred her to Dr. Ortiz who evaluated her for total hip arthroplasty.      She never followed up with me after 6/13/2017 and returns today, 10/21/2021.      She instead had surgery 11/9/2018 with Dr Walsh who did L4-5 and L5-S1 TLIFs with Elevate cages, and did L3-S1 Solera 4.75 pedicle screws, used BMP, attempted posterior fusion, question of pseudoarthrosis.    Patient completed Forteo time treatment and is now on Fosamax.    Her main complaint is back pain with standing, worse with ambulating, radiating up into her thoracic spine, better with rest. No significant leg pain        Past Medical History:   Diagnosis Date     Acute cystitis without hematuria      Acute pain of right hip 11/14/2016    Formatting of this note might be different from the original. December 2016, Seen at [facility] by neurosurgery, pain confirmed to be coming from her hip after an injection, referred to orthopedist. 6/2017 note from the spine specialist. Dr. Saskia Gastelum. Now seeing Dr. Ortiz at [facility] for hip pain. may need replacement.     Aftercare following right knee joint replacement surgery 12/18/2012    Formatting of this note might be  different from the original. 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  Uses Reglan, once daily 5/14/2013 Barahona's, repeat 3 years 12/2015 Barretts without dysplasia, repeat in 3 years.   Formatting of this note might be different from the original. 2010, patient recalls scope, patient recalls was told to recheck in 5 years.  Uses Reglan, once daily 5/14/2013 Barahona's, repeat 3      Barahona's esophagus      Brain aneurysm 8/1/2006    Overview:  Left  Thrombotic--aneursym clip  Formatting of this note might be different from the original. Left  Thrombotic--aneursym clip  Formatting of this note might be different from the original. brain aneurysm 2006 aug, left with right leg weakness     CAD (coronary artery disease) 3/7/2012    Overview:  Noted in old records, normal nuclear stress test 2010   Formatting of this note might be different from the original. Noted in old records, normal nuclear stress test 2010     Cerebral aneurysm     right leg weakness residual     Chronic kidney disease     CKD stage 3     Cigarette nicotine dependence in remission 11/14/2016     Colon polyps     past hx.     Coronary artery disease      CVA (cerebral vascular accident) (H) 12/30/2016     Depression      Depression with anxiety 10/18/2011    Overview:   Formatting of this note might be different from the original.     Fall at home  "12/30/2019     Fracture of lumbar vertebra (H) 4/5/2016    Overview:  Overview:  Bilateral L2 pedicle fracture Bilateral L2 pedicle fracture Replacement Utility updated for latest IMO load   Formatting of this note might be different from the original. Overview:  Bilateral L2 pedicle fracture     GERD (gastroesophageal reflux disease)      History of blood clots      History of colonic polyps 2/9/2012    Overview:  Last colonoscopy 9/22/9009 normal. Repeat recommended in 5 years.  5/16/2013 small polyp. Follow up  5 years.  12/15/2015 repeat colonoscopy normal. \"prep not great\".   Formatting of this note might be different from the original. Last colonoscopy 9/22/9009 normal. Repeat recommended in 5 years.  5/16/2013 small polyp. Follow up  5 years.  12/15/2015 repeat colonoscopy normal. \"prep n     History of transfusion      Hyperlipidemia      Hypertension      Hypertension, goal below 140/80 12/30/2016     Hypokalemia      Iron deficiency anemia 2/22/2016    Overview:  12/2015 EGD and colonoscopy did not show significant source of bleeding.  Only gastritis seen on upper endoscopy  Formatting of this note might be different from the original. 12/2015 EGD and colonoscopy did not show significant source of bleeding.  Only gastritis seen on upper endoscopy 2/3/2020 colonoscopy showed only a hyperplastic polyp  2021- Follows with Dr. Alonzo for iron infusi     Kyphosis of thoracic region 11/14/2016     Lactose intolerance      Low back pain 4/5/2016     Lumbar pseudoarthrosis      Lumbar radiculopathy 11/9/2018     Lumbar vertebral fracture (H)      Major depression, recurrent (H) 12/30/2016     Nontraumatic compression fracture of T7 vertebra (H) 11/14/2016     Osteoporosis      Pain medication agreement 3/13/2015    Overview:  Takes one Norco at bedtime for knee pain Started trial of Tramadol     Parathyroid adenoma      Postoperative anemia      Primary osteoarthritis of right knee 2/10/2021     Pseudoarthrosis of " lumbar spine 4/5/2016     Pseudophakia, both eyes 5/19/2017     PVD (peripheral vascular disease) (H)      Right hip pain      S/P lumbar spinal fusion 12/5/2012    Overview:  Dr. Rooney, 7/2012 4/2016 Seen by Dr. Saskia Gastelum neurosurgery at [facility], needs repeat surgery, spontaneous pedicle fractures,  but treatment first for osteoporosis with Foteo.   Formatting of this note might be different from the original. Dr. Rooney, 7/2012 4/2016 Seen by Dr. Saskia Gastelum neurosurgery at [facility], needs repeat surgery, spontaneous pedicle fr     S/P TKR (total knee replacement), right 4/26/2021     Scheuermann's kyphosis 7/19/2016     Senile osteoporosis 5/9/2012    Overview:   history of Barahona's so can not take bisphosphonates. Referred to endocrinology. 5/23/2012 seen by Dr. Stout, recommended IV reclast, at some point, timing will not affect upcoming spine surgery.  Seen by Dr. Denise, treated with Reclast 6/25/2014, 7/9/2015 Referred to Dr. Lala by neurosurgery Dr. Saskia Gastelum at [facility]. due to the need for repeat spinal surgery and persi     Severe sepsis (H) 12/16/2018     Stage 3a chronic kidney disease (H) 12/30/2016     Status post total hip replacement, right 10/6/2017     Stroke (H)     cerebral infarction-aneurysm     Synovial cyst of left popliteal space 2/10/2021     Ureteral stone 10/31/2018    Overview:  Treated with stent 4/2018 Dr. Dozier   Formatting of this note might be different from the original. Treated with stent 4/2018 Dr. Dozier     Urinary retention with incomplete bladder emptying 11/26/2018     Vitamin D deficiency      Xerostomia     dryness in mouth       Past Surgical History:   Procedure Laterality Date     APPENDECTOMY       ARTHROPLASTY REVISION HIP Right 10/6/2017    Procedure: REMOVAL OF TROCHANTERIC NAIL CONVERSION TO RIGHT TOTAL HIP ARTRHOPLASTY;  Surgeon: Seb Ortiz DO;  Location: [facility];  Service:      BACK SURGERY        CEREBRAL ANEURYSM REPAIR  2006    clipped     CEREBRAL ANEURYSM REPAIR       CERVICAL LAMINECTOMY      C6-7     CHOLECYSTECTOMY       EYE SURGERY Bilateral     cat     FEMORAL BYPASS  1995    left     FRACTURE SURGERY Right     hip pinning     HEMORRHOID SURGERY       HYSTERECTOMY      BSO     LUMBAR FUSION      L3-4, L4-5     OTHER SURGICAL HISTORY      right wrist closed reduction     PARATHYROIDECTOMY N/A 12/30/2016    Procedure: NECK EXPLORATION FOR PARATHYROID ;  Surgeon: Gilberto Snowden MD;  Location: [facility];  Service:        Social History     Socioeconomic History     Marital status: Single     Spouse name: None     Number of children: None     Years of education: None     Highest education level: None   Occupational History     None   Tobacco Use     Smoking status: Former Smoker     Packs/day: 0.50     Years: 15.00     Pack years: 7.50     Quit date: 1/1/2006     Years since quitting: 15.8     Smokeless tobacco: Never Used   Substance and Sexual Activity     Alcohol use: Yes     Comment: Alcoholic Drinks/day: rare     Drug use: No     Sexual activity: None   Other Topics Concern     None   Social History Narrative     None     Social Determinants of Health     Financial Resource Strain:      Difficulty of Paying Living Expenses:    Food Insecurity:      Worried About Running Out of Food in the Last Year:      Ran Out of Food in the Last Year:    Transportation Needs:      Lack of Transportation (Medical):      Lack of Transportation (Non-Medical):    Physical Activity:      Days of Exercise per Week:      Minutes of Exercise per Session:    Stress:      Feeling of Stress :    Social Connections:      Frequency of Communication with Friends and Family:      Frequency of Social Gatherings with Friends and Family:      Attends [employer] Services:      Active Member of Clubs or Organizations:      Attends Club or Organization Meetings:      Marital Status:    Intimate Partner Violence:      Fear  of Current or Ex-Partner:      Emotionally Abused:      Physically Abused:      Sexually Abused:        family history includes Cerebral aneurysm in her sister.        IMAGING per my own measurement and interpretation:  Xrays:  Severe sagittal malalignment due to thoracic hyperkyphosis          Resulted Imaging/Labs:  Bone Density:  MR Lumbar Spine w/o & w Contrast    Result Date: 7/15/2020  EXAM: MR LUMBAR SPINE W WO CONTRAST LOCATION: [facility] DATE/TIME: 7/14/2020 4:27 PM INDICATION: L/S-spine canal stenosis Back pain or radiculopathy, prior surgery, new symptoms evaluate for adjacent segment disease L3-4? symptoms of neurogenic claudication COMPARISON: Lumbar spine CT 07/02/2020 and MRI lumbar spine 10/10/2018. CONTRAST: Gadavist 6.5 TECHNIQUE: Without and with IV contrast FINDINGS: Nomenclature assumes 5 lumbar-type vertebral bodies. Postsurgical changes laminectomies at L4 and L5, posterior fusion from L3-S1, and interbody grafts at L4/L5 and L5/S1 with associated susceptibility. Small anterior marginal osteophyte at L2/L3. The vertebral bodies of the lumbar spine otherwise have normal stature, alignment, and marrow signal intensity. The conus terminates at the inferior plate of L2 and has normal morphology and appearance. No abnormal contrast enhancement within the terminal spinal cord or cauda equina nerve roots. T12/L1:  Normal disc signal and disc height. No posterior disc bulge or spinal canal narrowing. No neural foraminal narrowing. Large left perineural cyst. L1/L2:  Normal disc signal and disc height. No posterior disc bulge or spinal canal narrowing. No neural foraminal narrowing. L2/L3:  Mild loss of disc signal and disc height. Symmetric disc bulge, posterior annular fissure with a superimposed focal central disc protrusion and moderate facet arthropathy which contributes to moderate spinal canal narrowing. Moderate right and mild left neural foramina narrowing. L3/L4:  Susceptibility  from adjacent hardware obscures evaluation. No definite posterior disc bulge or spinal canal narrowing. No neural foraminal narrowing.  L4/L5:  L4 laminectomy. Spondylitic ridging without definite spinal canal narrowing. The left neural foramen is obscured by susceptibility. Moderate right neural foraminal narrowing. L5/S1: L5 laminectomy. Spondylitic ridging without spinal canal narrowing. The left neural foramen is obscured by susceptibility. Mild right neural foraminal narrowing.     1.  Postsurgical changes laminectomies at L4 and L5, posterior fusion from L3-S1, and interbody grafts at L4/L5 and L5/S1 with associated susceptibility. 2.  Susceptibility from posterior fusion hardware limits evaluation of the spinal canal and neural foramina from L3/L4-L5/S1. 3.  At the L2/L3 level, there is a symmetric disc bulge, posterior annular fissure with a superimposed focal central disc protrusion and moderate facet arthropathy which contributes to moderate spinal canal narrowing. Moderate right and mild left neural foraminal narrowing. 4.  Additional degenerative lumbar spondylosis with level by level analysis as described above.    CT Lumbar Spine w/o Contrast    Result Date: 7/2/2020  EXAM: CT LUMBAR SPINE WO CONTRAST LOCATION: [facility] DATE/TIME: 7/2/2020 11:22 AM INDICATION: L/S-spine fusion, follow up COMPARISON: Plain films 02/28/2019. TECHNIQUE: Routine without IV contrast. Multiplanar reformats. Dose reduction techniques were used. FINDINGS: VERTEBRA: Stable low-grade anterior subluxation L4 on L5. Lateral alignment otherwise satisfactory. Again seen are posterior instrumented fusion changes from L3 through S1 with prior laminectomy changes. The posterior fusion hardware appears intact and in satisfactory position. Lucency about the left S1 fixation screw. Screws otherwise well seated. Intradiscal fusion devices at the L4-L5 and L5-S1 levels appear in good position. Some degree of bony fusion at both  levels appears present. CANAL/FORAMINA: Grossly the central canal in the lumbar region is adequate allowing for or significant streak artifact at the fusion levels. EXTRASPINAL: No extraspinal abnormality.     1.  Again seen are posterior fusion changes from L3 through S1 with prior laminectomy changes. The fusion hardware appears intact and in satisfactory position. There is lucency about the left S1 fixation screw suggesting loosening of this screw. Remainder of the screws appear well seated. Lateral lumbar alignment stable with low-grade chronic anterior subluxation L4 on L5. Normal vertebral body heights. 2.  Intradiscal fusion devices at the L4-L5 and L5-S1 levels appear in satisfactory position. Some degree of osseous fusion across both levels likely present. 3.  Allowing for streak artifact grossly the central canal in the lumbar region is adequate.     XR Spine Complete Scoliosis 2 Views    Result Date: 7/15/2020  [facility] EXAM:  XR SCOLIOSIS AP OR PA AND LATERAL STANDING 7/14/2020 3:47 PM INDICATION: sagittal balance evaluation; adjacent segment disease, hx prior L3-5 fusion COMPARISON: None. FINDINGS: Nomenclature is based on 12 thoracic and 5 lumbar-type vertebral bodies. Sagittal balance is 6.9 cm positive. Lumbar lordosis measures 44.5 degrees. . This was measured from the superior endplate of L1 to the inferior endplate of L5. Thoracic kyphosis measures 79.0 degrees. . This was measured from the superior endplate of T1 to the inferior endplate of T12. Levoconvex scoliosis of the lumbar spine from the superior endplate of L1 to the inferior endplate of L5 measuring 11.8 degrees. Coronal balance is 0.5 cm to the left of the midline. Postsurgical changes posterior fusion from L3-S1 and interbody grafts at L4/L5 and L5/S1. No hardware complication. The partially imaged lungs are unremarkable. Postsurgical changes overlying the left hip. Postsurgical changes right hip arthroplasty. Soft tissues  "unremarkable       Vitamin D:  Vitamin D Deficiency Screening Results:  No results found for: VITDT  No results found for: QRT546, EXLM486, MTCC24MYISX, VITD3, D2VIT, D3VIT, DTOT, [id number], [id number], [id number], [id number], [id number], [id number]      Nutritional Status:  Estimated body mass index is 24.87 kg/m  as calculated from the following:    Height as of this encounter: 1.575 m (5' 2\").    Weight as of this encounter: 61.7 kg (136 lb).    Lab Results   Component Value Date    ALBUMIN 3.0 12/16/2018       Diabetes Screening:  No results found for: A1C    Nicotine Usage:    No                Physical Exam   BP (!) 154/85   Pulse 64   Resp 16   Ht 1.575 m (5' 2\")   Wt 61.7 kg (136 lb)   SpO2 95%   BMI 24.87 kg/m    Constitutional: Oriented to person, place, and time. Appears well-developed and well-nourished. Cooperative. No distress.     Neurological: alert and oriented to person, place, and time.     sensory deficit denies numbness  Gait antalgic      Reflex Scores: 3+ diffusely    STRENGTH LEFT RIGHT   Deltoid 5 5   Bicep 5 5   Wrist Extensor 5 5   Tricep 5 5   Finger flexion 5 5   Finger abduction 5 5    5 5       Hip Flexion     5     5   Knee Extension 5 5   Ankle Dorsiflexion 5 5   Extensor Hallucis Longus 5 5   Plantar Flexion 5 5   Foot eversion 5 5   Foot inversion 5 5     Unable to tandem walk      Sacroiliac Joint Exam LEFT RIGHT   Federico Finger Test - -   PSIS tenderness - -   ThighThrust - -   SURYA - -   Pelvic Gapping - -   Pelvic Compression - -   Gaenslen s - -   Sacral Thrust - -   Hip Exam     Posterior impingement - -   Medial femoral impingement - -         Skin: Skin is warm, dry and intact.   Psychiatric: Normal mood and affect. Speech is normal and behavior is normal.        ASSESSMENT:  Susan Patrick is a 75 year old female with osteoporosis, thoracic hyperkyphosis, prior L3-S1 fusion    PLAN:    Unfortunately, Michaels thoracic kyphotic deformity is the main  of " her sagittal malalignment and standing intolerance.  Fixing this would require a three-column osteotomy in the thoracic spine and an upper thoracic to pelvis fusion. The patient's medical co-morbidities give her an extremely elevated frailty index with severe risk for complications with large spinal deformity correction surgery. I think the risk of surgical intervention for spinal deformity correction is far greater than the potential benefit for Susan and I do not recommend further surgical intervention for her spine. She stated understanding.        Saskia Gastelum MD    [facility] Department of Neurosurgery  Complex Spinal Deformity, Scoliosis, and Minimally Invasive Spine Surgery Specialist  Office: [phone]    10/21/2021  12:10 PM      I performed independent visualization of radiographic imaging and entered my own interpretation, reviewed and/or ordered clinical laboratory tests, reviewed and/or ordered tests in radiology, reviewed and/or ordered medical tests, made the decision to obtain old records and/or history from someone other than the patient and Reviewed and summarized old records and/or discussed this case with another health care provider

## 2021-10-21 NOTE — PATIENT INSTRUCTIONS
Dr. Gastelum feels that risks of large surgical intervention to correct your spinal deformity are too large to proceed with surgery, and she does not recommend spine surgery.

## 2021-10-21 NOTE — NURSING NOTE
Chief Complaint   Patient presents with     Consult     Back Pain     [facility] NEW- LUMBAR PSEUDOARTHROSIS, THORACIC KYPHOSIS       Jaskaran Silver, EMT

## 2021-10-21 NOTE — LETTER
10/21/2021       RE: Susan Patrick  [address] [facility] [address]     Dear Colleague,    Thank you for referring your patient, Susan Patrick, to the [facility] NEUROSURGERY CLINIC [place] at [facility]. Please see a copy of my visit note below.    Neurosurgery Clinic Note    Chief Complaint: thoracic back pain    History of Present Illness:  It was a pleasure to evaluate Susan Patrikc in clinic today at the kind referral of Kaila Walsh MD  [address] [place], [place] [id number].    Susan Patrick is a 75 year old female whom I have not seen since 2017. I have never performed surgery on the patient.    In 2016, I saw her for L2 osteoporotic pedicle fractures, pseudoarthrosis of prior L3-L5 instrumented fusion by Dr. Dumont; I made diagnosis of osteoporosis after I ordered DEXA scan, I had gotten her started on Forteo in August 2016 and gotten her to stop smoking, I diagnosed a parathyroid adenoma and referred her to Dr. Snowden for resection which was performed in 2016, I diagnosed right hip arthropathy and referred her to Dr. Ortiz who evaluated her for total hip arthroplasty.      She never followed up with me after 6/13/2017 and returns today, 10/21/2021.      She instead had surgery 11/9/2018 with Dr Walsh who did L4-5 and L5-S1 TLIFs with Elevate cages, and did L3-S1 Solera 4.75 pedicle screws, used BMP, attempted posterior fusion, question of pseudoarthrosis.    Patient completed Forteo time treatment and is now on Fosamax.    Her main complaint is back pain with standing, worse with ambulating, radiating up into her thoracic spine, better with rest. No significant leg pain        Past Medical History:   Diagnosis Date     Acute cystitis without hematuria      Acute pain of right hip 11/14/2016    Formatting of this note might be different from the original. December 2016, Seen at [facility] by neurosurgery, pain  confirmed to be coming from her hip after an injection, referred to orthopedist. 6/2017 note from the spine specialist. Dr. Saskia Gastelum. Now seeing Dr. Ortiz at [facility] for hip pain. may need replacement.     Aftercare following right knee joint replacement surgery 12/18/2012    Formatting of this note might be different from the original. 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  Uses Reglan, once daily 5/14/2013 Barahona's, repeat 3 years 12/2015 Barretts without dysplasia, repeat in 3 years.   Formatting of this note might be different from the original. 2010, patient recalls scope, patient recalls was told to recheck in 5 years.  Uses Reglan, once daily 5/14/2013 Barahona's, repeat 3      Barahona's esophagus      Brain aneurysm 8/1/2006    Overview:  Left  Thrombotic--aneursym clip  Formatting of this note might be different from the original. Left  Thrombotic--aneursym clip  Formatting of this note might be different from the original. brain aneurysm 2006 aug, left with right leg weakness     CAD (coronary artery disease) 3/7/2012    Overview:  Noted in old records, normal nuclear stress test 2010   Formatting of this note might be different from the original. Noted in old records, normal nuclear stress test 2010     Cerebral aneurysm     right leg weakness residual     Chronic kidney disease     CKD stage 3  "    Cigarette nicotine dependence in remission 11/14/2016     Colon polyps     past hx.     Coronary artery disease      CVA (cerebral vascular accident) (H) 12/30/2016     Depression      Depression with anxiety 10/18/2011    Overview:   Formatting of this note might be different from the original.     Fall at home 12/30/2019     Fracture of lumbar vertebra (H) 4/5/2016    Overview:  Overview:  Bilateral L2 pedicle fracture Bilateral L2 pedicle fracture Replacement Utility updated for latest IMO load   Formatting of this note might be different from the original. Overview:  Bilateral L2 pedicle fracture     GERD (gastroesophageal reflux disease)      History of blood clots      History of colonic polyps 2/9/2012    Overview:  Last colonoscopy 9/22/9009 normal. Repeat recommended in 5 years.  5/16/2013 small polyp. Follow up  5 years.  12/15/2015 repeat colonoscopy normal. \"prep not great\".   Formatting of this note might be different from the original. Last colonoscopy 9/22/9009 normal. Repeat recommended in 5 years.  5/16/2013 small polyp. Follow up  5 years.  12/15/2015 repeat colonoscopy normal. \"prep n     History of transfusion      Hyperlipidemia      Hypertension      Hypertension, goal below 140/80 12/30/2016     Hypokalemia      Iron deficiency anemia 2/22/2016    Overview:  12/2015 EGD and colonoscopy did not show significant source of bleeding.  Only gastritis seen on upper endoscopy  Formatting of this note might be different from the original. 12/2015 EGD and colonoscopy did not show significant source of bleeding.  Only gastritis seen on upper endoscopy 2/3/2020 colonoscopy showed only a hyperplastic polyp  2021- Follows with Dr. Alonzo for iron infusi     Kyphosis of thoracic region 11/14/2016     Lactose intolerance      Low back pain 4/5/2016     Lumbar pseudoarthrosis      Lumbar radiculopathy 11/9/2018     Lumbar vertebral fracture (H)      Major depression, recurrent (H) 12/30/2016     " Nontraumatic compression fracture of T7 vertebra (H) 11/14/2016     Osteoporosis      Pain medication agreement 3/13/2015    Overview:  Takes one Norco at bedtime for knee pain Started trial of Tramadol     Parathyroid adenoma      Postoperative anemia      Primary osteoarthritis of right knee 2/10/2021     Pseudoarthrosis of lumbar spine 4/5/2016     Pseudophakia, both eyes 5/19/2017     PVD (peripheral vascular disease) (H)      Right hip pain      S/P lumbar spinal fusion 12/5/2012    Overview:  Dr. Rooney, 7/2012 4/2016 Seen by Dr. Saskia Gastelum neurosurgery at [facility], needs repeat surgery, spontaneous pedicle fractures,  but treatment first for osteoporosis with Foteo.   Formatting of this note might be different from the original. Dr. Rooney, 7/2012 4/2016 Seen by Dr. Saskia Gastelum neurosurgery at [facility], needs repeat surgery, spontaneous pedicle fr     S/P TKR (total knee replacement), right 4/26/2021     Scheuermann's kyphosis 7/19/2016     Senile osteoporosis 5/9/2012    Overview:   history of Barahona's so can not take bisphosphonates. Referred to endocrinology. 5/23/2012 seen by Dr. Stout, recommended IV reclast, at some point, timing will not affect upcoming spine surgery.  Seen by Dr. Denise, treated with Reclast 6/25/2014, 7/9/2015 Referred to Dr. Lala by neurosurgery Dr. Saskia Gastelum at [facility]. due to the need for repeat spinal surgery and persi     Severe sepsis (H) 12/16/2018     Stage 3a chronic kidney disease (H) 12/30/2016     Status post total hip replacement, right 10/6/2017     Stroke (H)     cerebral infarction-aneurysm     Synovial cyst of left popliteal space 2/10/2021     Ureteral stone 10/31/2018    Overview:  Treated with stent 4/2018 Dr. Dozier   Formatting of this note might be different from the original. Treated with stent 4/2018 Dr. Dozier     Urinary retention with incomplete bladder emptying 11/26/2018     Vitamin D deficiency      Xerostomia      dryness in mouth       Past Surgical History:   Procedure Laterality Date     APPENDECTOMY       ARTHROPLASTY REVISION HIP Right 10/6/2017    Procedure: REMOVAL OF TROCHANTERIC NAIL CONVERSION TO RIGHT TOTAL HIP ARTRHOPLASTY;  Surgeon: Seb Ortiz DO;  Location: [facility];  Service:      BACK SURGERY       CEREBRAL ANEURYSM REPAIR  2006    clipped     CEREBRAL ANEURYSM REPAIR       CERVICAL LAMINECTOMY      C6-7     CHOLECYSTECTOMY       EYE SURGERY Bilateral     cat     FEMORAL BYPASS  1995    left     FRACTURE SURGERY Right     hip pinning     HEMORRHOID SURGERY       HYSTERECTOMY      BSO     LUMBAR FUSION      L3-4, L4-5     OTHER SURGICAL HISTORY      right wrist closed reduction     PARATHYROIDECTOMY N/A 12/30/2016    Procedure: NECK EXPLORATION FOR PARATHYROID ;  Surgeon: Gilberto Snowden MD;  Location: [facility];  Service:        Social History     Socioeconomic History     Marital status: Single     Spouse name: None     Number of children: None     Years of education: None     Highest education level: None   Occupational History     None   Tobacco Use     Smoking status: Former Smoker     Packs/day: 0.50     Years: 15.00     Pack years: 7.50     Quit date: 1/1/2006     Years since quitting: 15.8     Smokeless tobacco: Never Used   Substance and Sexual Activity     Alcohol use: Yes     Comment: Alcoholic Drinks/day: rare     Drug use: No     Sexual activity: None   Other Topics Concern     None   Social History Narrative     None     Social Determinants of Health     Financial Resource Strain:      Difficulty of Paying Living Expenses:    Food Insecurity:      Worried About Running Out of Food in the Last Year:      Ran Out of Food in the Last Year:    Transportation Needs:      Lack of Transportation (Medical):      Lack of Transportation (Non-Medical):    Physical Activity:      Days of Exercise per Week:      Minutes of Exercise per Session:    Stress:      Feeling of Stress :     Social Connections:      Frequency of Communication with Friends and Family:      Frequency of Social Gatherings with Friends and Family:      Attends Rastafari Services:      Active Member of Clubs or Organizations:      Attends Club or Organization Meetings:      Marital Status:    Intimate Partner Violence:      Fear of Current or Ex-Partner:      Emotionally Abused:      Physically Abused:      Sexually Abused:        family history includes Cerebral aneurysm in her sister.        IMAGING per my own measurement and interpretation:  Xrays:  Severe sagittal malalignment due to thoracic hyperkyphosis          Resulted Imaging/Labs:  Bone Density:  MR Lumbar Spine w/o & w Contrast    Result Date: 7/15/2020  EXAM: MR LUMBAR SPINE W WO CONTRAST LOCATION: [facility] DATE/TIME: 7/14/2020 4:27 PM INDICATION: L/S-spine canal stenosis Back pain or radiculopathy, prior surgery, new symptoms evaluate for adjacent segment disease L3-4? symptoms of neurogenic claudication COMPARISON: Lumbar spine CT 07/02/2020 and MRI lumbar spine 10/10/2018. CONTRAST: Gadavist 6.5 TECHNIQUE: Without and with IV contrast FINDINGS: Nomenclature assumes 5 lumbar-type vertebral bodies. Postsurgical changes laminectomies at L4 and L5, posterior fusion from L3-S1, and interbody grafts at L4/L5 and L5/S1 with associated susceptibility. Small anterior marginal osteophyte at L2/L3. The vertebral bodies of the lumbar spine otherwise have normal stature, alignment, and marrow signal intensity. The conus terminates at the inferior plate of L2 and has normal morphology and appearance. No abnormal contrast enhancement within the terminal spinal cord or cauda equina nerve roots. T12/L1:  Normal disc signal and disc height. No posterior disc bulge or spinal canal narrowing. No neural foraminal narrowing. Large left perineural cyst. L1/L2:  Normal disc signal and disc height. No posterior disc bulge or spinal canal narrowing. No neural foraminal  narrowing. L2/L3:  Mild loss of disc signal and disc height. Symmetric disc bulge, posterior annular fissure with a superimposed focal central disc protrusion and moderate facet arthropathy which contributes to moderate spinal canal narrowing. Moderate right and mild left neural foramina narrowing. L3/L4:  Susceptibility from adjacent hardware obscures evaluation. No definite posterior disc bulge or spinal canal narrowing. No neural foraminal narrowing.  L4/L5:  L4 laminectomy. Spondylitic ridging without definite spinal canal narrowing. The left neural foramen is obscured by susceptibility. Moderate right neural foraminal narrowing. L5/S1: L5 laminectomy. Spondylitic ridging without spinal canal narrowing. The left neural foramen is obscured by susceptibility. Mild right neural foraminal narrowing.     1.  Postsurgical changes laminectomies at L4 and L5, posterior fusion from L3-S1, and interbody grafts at L4/L5 and L5/S1 with associated susceptibility. 2.  Susceptibility from posterior fusion hardware limits evaluation of the spinal canal and neural foramina from L3/L4-L5/S1. 3.  At the L2/L3 level, there is a symmetric disc bulge, posterior annular fissure with a superimposed focal central disc protrusion and moderate facet arthropathy which contributes to moderate spinal canal narrowing. Moderate right and mild left neural foraminal narrowing. 4.  Additional degenerative lumbar spondylosis with level by level analysis as described above.    CT Lumbar Spine w/o Contrast    Result Date: 7/2/2020  EXAM: CT LUMBAR SPINE WO CONTRAST LOCATION: [facility] DATE/TIME: 7/2/2020 11:22 AM INDICATION: L/S-spine fusion, follow up COMPARISON: Plain films 02/28/2019. TECHNIQUE: Routine without IV contrast. Multiplanar reformats. Dose reduction techniques were used. FINDINGS: VERTEBRA: Stable low-grade anterior subluxation L4 on L5. Lateral alignment otherwise satisfactory. Again seen are posterior instrumented fusion  changes from L3 through S1 with prior laminectomy changes. The posterior fusion hardware appears intact and in satisfactory position. Lucency about the left S1 fixation screw. Screws otherwise well seated. Intradiscal fusion devices at the L4-L5 and L5-S1 levels appear in good position. Some degree of bony fusion at both levels appears present. CANAL/FORAMINA: Grossly the central canal in the lumbar region is adequate allowing for or significant streak artifact at the fusion levels. EXTRASPINAL: No extraspinal abnormality.     1.  Again seen are posterior fusion changes from L3 through S1 with prior laminectomy changes. The fusion hardware appears intact and in satisfactory position. There is lucency about the left S1 fixation screw suggesting loosening of this screw. Remainder of the screws appear well seated. Lateral lumbar alignment stable with low-grade chronic anterior subluxation L4 on L5. Normal vertebral body heights. 2.  Intradiscal fusion devices at the L4-L5 and L5-S1 levels appear in satisfactory position. Some degree of osseous fusion across both levels likely present. 3.  Allowing for streak artifact grossly the central canal in the lumbar region is adequate.     XR Spine Complete Scoliosis 2 Views    Result Date: 7/15/2020  [facility] EXAM:  XR SCOLIOSIS AP OR PA AND LATERAL STANDING 7/14/2020 3:47 PM INDICATION: sagittal balance evaluation; adjacent segment disease, hx prior L3-5 fusion COMPARISON: None. FINDINGS: Nomenclature is based on 12 thoracic and 5 lumbar-type vertebral bodies. Sagittal balance is 6.9 cm positive. Lumbar lordosis measures 44.5 degrees. . This was measured from the superior endplate of L1 to the inferior endplate of L5. Thoracic kyphosis measures 79.0 degrees. . This was measured from the superior endplate of T1 to the inferior endplate of T12. Levoconvex scoliosis of the lumbar spine from the superior endplate of L1 to the inferior endplate of L5 measuring 11.8  "degrees. Coronal balance is 0.5 cm to the left of the midline. Postsurgical changes posterior fusion from L3-S1 and interbody grafts at L4/L5 and L5/S1. No hardware complication. The partially imaged lungs are unremarkable. Postsurgical changes overlying the left hip. Postsurgical changes right hip arthroplasty. Soft tissues unremarkable       Vitamin D:  Vitamin D Deficiency Screening Results:  No results found for: VITDT  No results found for: YEM398, CZUT784, IOZI42OPTKV, VITD3, D2VIT, D3VIT, DTOT, [id number], [id number], [id number], [id number], [id number], [id number]      Nutritional Status:  Estimated body mass index is 24.87 kg/m  as calculated from the following:    Height as of this encounter: 1.575 m (5' 2\").    Weight as of this encounter: 61.7 kg (136 lb).    Lab Results   Component Value Date    ALBUMIN 3.0 12/16/2018       Diabetes Screening:  No results found for: A1C    Nicotine Usage:    No                Physical Exam   BP (!) 154/85   Pulse 64   Resp 16   Ht 1.575 m (5' 2\")   Wt 61.7 kg (136 lb)   SpO2 95%   BMI 24.87 kg/m    Constitutional: Oriented to person, place, and time. Appears well-developed and well-nourished. Cooperative. No distress.     Neurological: alert and oriented to person, place, and time.     sensory deficit denies numbness  Gait antalgic      Reflex Scores: 3+ diffusely    STRENGTH LEFT RIGHT   Deltoid 5 5   Bicep 5 5   Wrist Extensor 5 5   Tricep 5 5   Finger flexion 5 5   Finger abduction 5 5    5 5       Hip Flexion     5     5   Knee Extension 5 5   Ankle Dorsiflexion 5 5   Extensor Hallucis Longus 5 5   Plantar Flexion 5 5   Foot eversion 5 5   Foot inversion 5 5     Unable to tandem walk      Sacroiliac Joint Exam LEFT RIGHT   Federico Finger Test - -   PSIS tenderness - -   ThighThrust - -   SURYA - -   Pelvic Gapping - -   Pelvic Compression - -   Gaenslen s - -   Sacral Thrust - -   Hip Exam     Posterior impingement - -   Medial femoral impingement - - "         Skin: Skin is warm, dry and intact.   Psychiatric: Normal mood and affect. Speech is normal and behavior is normal.        ASSESSMENT:  Susan Patrick is a 75 year old female with osteoporosis, thoracic hyperkyphosis, prior L3-S1 fusion    PLAN:    Unfortunately, Susan's thoracic kyphotic deformity is the main  of her sagittal malalignment and standing intolerance.  Fixing this would require a three-column osteotomy in the thoracic spine and an upper thoracic to pelvis fusion. The patient's medical co-morbidities give her an extremely elevated frailty index with severe risk for complications with large spinal deformity correction surgery. I think the risk of surgical intervention for spinal deformity correction is far greater than the potential benefit for Susan and I do not recommend further surgical intervention for her spine. She stated understanding.        Saskia Gastelum MD    [facility] Department of Neurosurgery  Complex Spinal Deformity, Scoliosis, and Minimally Invasive Spine Surgery Specialist  Office: [phone]    10/21/2021  12:10 PM      I performed independent visualization of radiographic imaging and entered my own interpretation, reviewed and/or ordered clinical laboratory tests, reviewed and/or ordered tests in radiology, reviewed and/or ordered medical tests, made the decision to obtain old records and/or history from someone other than the patient and Reviewed and summarized old records and/or discussed this case with another health care provider        Again, thank you for allowing me to participate in the care of your patient.      Sincerely,    Saskia Gastelum MD

## 2021-11-02 ENCOUNTER — VIRTUAL VISIT (OUTPATIENT)
Dept: NEUROSURGERY | Facility: CLINIC | Age: 76
End: 2021-11-02
Payer: MEDICARE

## 2021-11-02 DIAGNOSIS — S32.009K PSEUDOARTHROSIS OF LUMBAR SPINE: Primary | ICD-10-CM

## 2021-11-02 DIAGNOSIS — M40.204 KYPHOSIS OF THORACIC REGION, UNSPECIFIED KYPHOSIS TYPE: ICD-10-CM

## 2021-11-02 PROCEDURE — 99442 PR PHYSICIAN TELEPHONE EVALUATION 11-20 MIN: CPT | Mod: 95 | Performed by: NURSE PRACTITIONER

## 2021-11-02 NOTE — PROGRESS NOTES
"Neurosurgery telephone visit:    Susan is a 76yo F hx osteoporosis (completed Forteo, now on Fosamax) sp revision of a prior L3-5 instrumented fusion with L4-5 pseudoarthrosis with subsequent L4-5, L5-S1 instrumented TLIF on 11/9/2018 by Dr Walsh.  At her last appt with Dr Walsh, she was referred for an opinion on deformity correction surgery by Dr Gastelum at the . She had some additional questions for us after this visit and requested telephone visit-    Susan states she was not offered surgery by Dr Gastelum, was deemed to be too high risk based on her medical history. Susan felt strongly that risk would be worth it to her if she were to get 2-3yrs of improvement in pain given her age. This statement ultimately did not change the outcome. She worries that her pain will only get worse over time and is subsequently wondering if she should pursue a third opinion at the [facility] versus maximizing conservative/preventative care. She doesn't necessarily want to travel that distance if the answer again will be equivocally \"no\". We had a lengthy discussion and I encouraged her to pursue/maximize all conservative options available to her (except chiropractic care), and will ask Dr Walsh for any additional recs when she returns. Patient was satisfied with plan.    Plan:  1. Susan will contact her insurance to see where she could go for acupuncture  2. Referral placed for the Spine center for injections  3. Referral placed for the pain team to consider med mgmt including Medical marijuana   4. Referral placed for physical therapy  5. Will send msg to Dr Walsh for any additional recommendations including whether it would be worth getting a 3rd opinion from the [facility]     No exam performed given telephone visit    Length of telephone visit 15mins    Comfort STERLING-C  [facility] Neurosurgery  O. [phone]          "

## 2021-11-02 NOTE — LETTER
"    11/2/2021         RE: Susan Patrick  [address] [facility] [address]        Dear Colleague,    Thank you for referring your patient, Susan Patrick, to the [facility] NEUROSURGERY CLINIC [facility]. Please see a copy of my visit note below.    Neurosurgery telephone visit:    Susan is a 76yo F hx osteoporosis (completed Forteo, now on Fosamax) sp revision of a prior L3-5 instrumented fusion with L4-5 pseudoarthrosis with subsequent L4-5, L5-S1 instrumented TLIF on 11/9/2018 by Dr Walsh.  At her last appt with Dr Walsh, she was referred for an opinion on deformity correction surgery by Dr Gastelum at the . She had some additional questions for us after this visit and requested telephone visit-    Susan states she was not offered surgery by Dr Gastelum, was deemed to be too high risk based on her medical history. Susan felt strongly that risk would be worth it to her if she were to get 2-3yrs of improvement in pain given her age. This statement ultimately did not change the outcome. She worries that her pain will only get worse over time and is subsequently wondering if she should pursue a third opinion at the [facility] versus maximizing conservative/preventative care. She doesn't necessarily want to travel that distance if the answer again will be equivocally \"no\". We had a lengthy discussion and I encouraged her to pursue/maximize all conservative options available to her (except chiropractic care), and will ask Dr Walsh for any additional recs when she returns. Patient was satisfied with plan.    Plan:  1. Susan will contact her insurance to see where she could go for acupuncture  2. Referral placed for the Spine center for injections  3. Referral placed for the pain team to consider med mgmt including Medical marijuana   4. Referral placed for physical therapy  5. Will send msg to Dr Walsh for any additional recommendations including whether it would be worth getting a 3rd opinion from the [facility]     No " exam performed given telephone visit    Length of telephone visit 15mins    Comfort BRANDTP-KANCHAN  [facility] Neurosurgery  O. [phone]              Again, thank you for allowing me to participate in the care of your patient.        Sincerely,        CHANDLER Pearson CNP

## 2021-11-08 ENCOUNTER — OFFICE VISIT (OUTPATIENT)
Dept: PHYSICAL MEDICINE AND REHAB | Facility: CLINIC | Age: 76
End: 2021-11-08
Attending: NURSE PRACTITIONER
Payer: MEDICARE

## 2021-11-08 VITALS
DIASTOLIC BLOOD PRESSURE: 63 MMHG | BODY MASS INDEX: 24.84 KG/M2 | HEART RATE: 65 BPM | SYSTOLIC BLOOD PRESSURE: 139 MMHG | HEIGHT: 62 IN | WEIGHT: 135 LBS

## 2021-11-08 DIAGNOSIS — M47.816 LUMBAR FACET ARTHROPATHY: ICD-10-CM

## 2021-11-08 DIAGNOSIS — Z98.1 HISTORY OF LUMBAR FUSION: ICD-10-CM

## 2021-11-08 DIAGNOSIS — M40.204 KYPHOSIS OF THORACIC REGION, UNSPECIFIED KYPHOSIS TYPE: ICD-10-CM

## 2021-11-08 DIAGNOSIS — M48.061 SPINAL STENOSIS OF LUMBAR REGION, UNSPECIFIED WHETHER NEUROGENIC CLAUDICATION PRESENT: Primary | ICD-10-CM

## 2021-11-08 PROCEDURE — 99204 OFFICE O/P NEW MOD 45 MIN: CPT | Performed by: PHYSICIAN ASSISTANT

## 2021-11-08 ASSESSMENT — PAIN SCALES - GENERAL: PAINLEVEL: MODERATE PAIN (5)

## 2021-11-08 ASSESSMENT — MIFFLIN-ST. JEOR: SCORE: 1050.61

## 2021-11-08 NOTE — PROGRESS NOTES
ASSESSMENT: Susan Patrick is a 75 year old female with past medical history significant for history of CVA, vitamin D deficiency, osteoporosis, Barahona esophagus, hypertension, peripheral vascular disease, coronary artery disease (on Plavix), chronic kidney disease, depression who presents today for new patient evaluation of chronic and worsening bilateral low back pain with radiation up the midline thoracic spine.  Patient has a history of a prior L3-L5 instrumented fusion by Dr. Mae Fisher with pseudoarthrosis and then subsequent L4-5, L5-S1 instrumented TLIF November 9, 2018 with Dr. Walsh.  Patient has thoracic kyphotic deformity with very positive global coronal imbalance seen on her total spine x-ray which is thought to be a significant etiology for her pain.  An MRI lumbar spine from July 2020 also showed moderate spinal stenosis L2-3 related to a disc protrusion and moderate facet arthropathy.  Patient does endorse limited walking and standing tolerance which could be related to the lumbar spinal stenosis.  -Patient saw Dr. Gasetlum October 21, 2021.  Dr. Gastelum felt that the risk of surgical intervention for spinal deformity correction is far greater than the potential benefit and did not recommend surgical intervention for her spine.    PLAN:  A shared decision making model was used.  The patient's values and choices were respected.  The following represents what was discussed and decided upon by the physician assistant and the patient.      1.  DIAGNOSTIC TESTS: I reviewed the MRI lumbar spine from July 2020.  I reviewed the CT lumbar spine from July 2020.  I also reviewed the spine x-rays from October 2021.  No further diagnostic tests were ordered.    2.  PHYSICAL THERAPY: Patient is scheduled to begin physical therapy November 24, 2021.    3.  MEDICATIONS: No changes are made to the patient's medications.  -Patient takes Tylenol as needed.  -Patient uses gabapentin 100 mg 3 times daily.  -Patient  applies icy hot patches as needed.  -Patient is interested in finding out if she may qualify for medical marijuana.  She was referred to the pain clinic by neurosurgery but has not yet heard from them to schedule.  She was given their phone number today.    4.  INTERVENTIONS:    -I offer the patient bilateral L2-3 transforaminal epidural steroid injections.  Patient indicated she would like to proceed and an order was placed.  She is scheduled to get her Covid booster November 17, 2021.  She will schedule this procedure for on or after December 2, 2021.  -If that does not help, we could consider bilateral L1, L2 medial branch blocks to determine if there is facet mediated pain from the L2-3 level adjacent to her fusion.  -If all else fails she may be a candidate for spinal cord stimulator.    5.  PATIENT EDUCATION: Patient is in agreement the above plan.  All questions were answered.    6.  FOLLOW-UP:   Patient will follow up with me 2 weeks after her bilateral L2-3 transforaminal epidural steroid injections.  If she has questions or concerns in the meantime, she should not hesitate to call.      SUBJECTIVE:  Susan Patrick  Is a 75 year old female who presents today in consultation at the request of Comfort Jones CNP for new patient evaluation of low back pain with radiation of the thoracic spine.  Patient has a history of lumbar fusion x2, most recently with Dr. Walsh in 2018.  Patient reports that she had some improvement in her pain following surgery but then pain got worse again.  She feels like it is gradually worsening.  She was recently seen by Dr. Gastelum who did not recommend any additional surgical intervention.  She was then referred to us to discuss nonsurgical options for her pain.    Patient complains of bilateral low back pain.  Pain spans across the low back in a broadband distribution.  Both sides are equally painful.  The pain then radiates up the midline of her thoracic spine to between her  shoulder blades.  Patient describes the lower back as feeling like a chronic ache with a pressure discomfort.  She denies any pain radiating in the buttocks or down the legs.  She states that she has intermittent left leg tingling in her calf when she is lying in bed and she has to get out of bed and walk around to alleviate that.  Otherwise she denies numbness or tingling.  She states that her legs feel weak and tired with walking.  She states that she has to lean further and further forward to alleviate this.  Her pain is aggravated with walking.  She estimates she can walk 2 blocks before she has to stop and rest.  Standing is limited to 15 minutes.  Pain is also aggravated bending forward, combing her hair, and she feels worse when she wears a bra.  Pain is alleviated with sitting and applying he denies.  Patient notes that her activity is very restricted because of her pain.  She has to order her groceries because she cannot do the walking, although she states hanging onto a shopping cart helps.  She denies loss of bowel or bladder control.  Denies any recent fevers, chills, sweats.    Patient has not had any physical therapy for her lower back.  She is scheduled to begin physical therapy November 24.  She had injections in her low back before her first fusion surgery but she has not had any more recent injections.  She does not go to a chiropractor.  She uses gabapentin 100 mg 3 times daily.  She takes Tylenol as needed.  She does not feel like these are very helpful.  She applies icy hot patches as needed which provide slight relief of her pain.    Current Outpatient Medications   Medication     acetaminophen (TYLENOL) 500 MG tablet     alendronate (FOSAMAX) 70 MG tablet     amLODIPine (NORVASC) 10 MG tablet     aspirin 81 mg chewable tablet     calcium carbonate-vitamin D3 600 mg(1,500mg) -800 unit per tablet     cholecalciferol, vitamin D3, 1,000 unit tablet     clopidogrel (PLAVIX) 75 mg tablet      cyanocobalamin 1000 MCG tablet     esomeprazole (NEXIUM) 20 MG capsule     gabapentin (NEURONTIN) 100 MG capsule     hydroCHLOROthiazide (HYDRODIURIL) 25 MG tablet     HYDROcodone-acetaminophen 5-325 mg per tablet     lisinopriL (PRINIVIL,ZESTRIL) 40 MG tablet     mirtazapine (REMERON) 30 MG tablet     MULTIVITAMIN ORAL     pravastatin (PRAVACHOL) 40 MG tablet     senna (SENOKOT) 8.6 mg tablet     sertraline (ZOLOFT) 50 MG tablet         Allergies   Allergen Reactions     Lactose Other (See Comments)     Diarrhea; Lactose intolerant     Metoprolol Unknown     Bradycardia, heart rate in 30's      Percocet [Oxycodone-Acetaminophen] Other (See Comments)     hallucinations     Augmentin Hives, Swelling and Rash     Caused sores, facial swelling       Past Medical History:   Diagnosis Date     Acute cystitis without hematuria      Acute pain of right hip 11/14/2016    Formatting of this note might be different from the original. December 2016, Seen at [facility] by neurosurgery, pain confirmed to be coming from her hip after an injection, referred to orthopedist. 6/2017 note from the spine specialist. Dr. Saskia Gastelum. Now seeing Dr. Ortiz at [facility] for hip pain. may need replacement.     Aftercare following right knee joint replacement surgery 12/18/2012    Formatting of this note might be different from the original. Patient has identified Health Care Agent(s): Yes Add Health Care Agents: Yes   Health Care Agent(s): Primary Health Care Agent: Charlene Ramirez Relationship: daughter Phone: [phone]  Secondary Health Care Agent: Ian Patrick  Relationship: son Phone:[phone]    Patient has Advance Care Plan Documents (Health Care Directive, POLS     LADARIUS (acute kidney injury) (H)      Anemia     Iron deficiency     Anxiety      Arthritis      Arthropathy of right hip 6/13/2017     Bacteremia due to Escherichia coli 12/20/2018     Barahona esophagus 2/9/2012    Overview:  2010, patient recalls scope,  "patient recalls was told to recheck in 5 years.  Uses Reglan, once daily 5/14/2013 Barahona's, repeat 3 years 12/2015 Barretts without dysplasia, repeat in 3 years.   Formatting of this note might be different from the original. 2010, patient recalls scope, patient recalls was told to recheck in 5 years.  Uses Reglan, once daily 5/14/2013 Barahona's, repeat 3      Barahona's esophagus      Brain aneurysm 8/1/2006    Overview:  Left  Thrombotic--aneursym clip  Formatting of this note might be different from the original. Left  Thrombotic--aneursym clip  Formatting of this note might be different from the original. brain aneurysm 2006 aug, left with right leg weakness     CAD (coronary artery disease) 3/7/2012    Overview:  Noted in old records, normal nuclear stress test 2010   Formatting of this note might be different from the original. Noted in old records, normal nuclear stress test 2010     Cerebral aneurysm     right leg weakness residual     Chronic kidney disease     CKD stage 3     Cigarette nicotine dependence in remission 11/14/2016     Colon polyps     past hx.     Coronary artery disease      CVA (cerebral vascular accident) (H) 12/30/2016     Depression      Depression with anxiety 10/18/2011    Overview:   Formatting of this note might be different from the original.     Fall at home 12/30/2019     Fracture of lumbar vertebra (H) 4/5/2016    Overview:  Overview:  Bilateral L2 pedicle fracture Bilateral L2 pedicle fracture Replacement Utility updated for latest IMO load   Formatting of this note might be different from the original. Overview:  Bilateral L2 pedicle fracture     GERD (gastroesophageal reflux disease)      History of blood clots      History of colonic polyps 2/9/2012    Overview:  Last colonoscopy 9/22/9009 normal. Repeat recommended in 5 years.  5/16/2013 small polyp. Follow up  5 years.  12/15/2015 repeat colonoscopy normal. \"prep not great\".   Formatting of this note might be different " "from the original. Last colonoscopy 9/22/9009 normal. Repeat recommended in 5 years.  5/16/2013 small polyp. Follow up  5 years.  12/15/2015 repeat colonoscopy normal. \"prep n     History of transfusion      Hyperlipidemia      Hypertension      Hypertension, goal below 140/80 12/30/2016     Hypokalemia      Iron deficiency anemia 2/22/2016    Overview:  12/2015 EGD and colonoscopy did not show significant source of bleeding.  Only gastritis seen on upper endoscopy  Formatting of this note might be different from the original. 12/2015 EGD and colonoscopy did not show significant source of bleeding.  Only gastritis seen on upper endoscopy 2/3/2020 colonoscopy showed only a hyperplastic polyp  2021- Follows with Dr. Alonzo for iron infusi     Kyphosis of thoracic region 11/14/2016     Lactose intolerance      Low back pain 4/5/2016     Lumbar pseudoarthrosis      Lumbar radiculopathy 11/9/2018     Lumbar vertebral fracture (H)      Major depression, recurrent (H) 12/30/2016     Nontraumatic compression fracture of T7 vertebra (H) 11/14/2016     Osteoporosis      Pain medication agreement 3/13/2015    Overview:  Takes one Norco at bedtime for knee pain Started trial of Tramadol     Parathyroid adenoma      Postoperative anemia      Primary osteoarthritis of right knee 2/10/2021     Pseudoarthrosis of lumbar spine 4/5/2016     Pseudophakia, both eyes 5/19/2017     PVD (peripheral vascular disease) (H)      Right hip pain      S/P lumbar spinal fusion 12/5/2012    Overview:  Dr. Rooney, 7/2012 4/2016 Seen by Dr. Saskia Gastelum neurosurgery at [facility], needs repeat surgery, spontaneous pedicle fractures,  but treatment first for osteoporosis with Foteo.   Formatting of this note might be different from the original. Dr. Rooney, 7/2012 4/2016 Seen by Dr. Saskia Gastelum neurosurgery at [facility], needs repeat surgery, spontaneous pedicle fr     S/P TKR (total knee replacement), right 4/26/2021     " Scheuermann's kyphosis 7/19/2016     Senile osteoporosis 5/9/2012    Overview:   history of Barahona's so can not take bisphosphonates. Referred to endocrinology. 5/23/2012 seen by Dr. Stout, recommended IV reclast, at some point, timing will not affect upcoming spine surgery.  Seen by Dr. Denise, treated with Reclast 6/25/2014, 7/9/2015 Referred to Dr. Lala by neurosurgery Dr. Saskia Gastelum at [facility]. due to the need for repeat spinal surgery and persi     Severe sepsis (H) 12/16/2018     Stage 3a chronic kidney disease (H) 12/30/2016     Status post total hip replacement, right 10/6/2017     Stroke (H)     cerebral infarction-aneurysm     Synovial cyst of left popliteal space 2/10/2021     Ureteral stone 10/31/2018    Overview:  Treated with stent 4/2018 Dr. Dozier   Formatting of this note might be different from the original. Treated with stent 4/2018 Dr. Dozier     Urinary retention with incomplete bladder emptying 11/26/2018     Vitamin D deficiency      Xerostomia     dryness in mouth        Patient Active Problem List   Diagnosis     Pseudoarthrosis of lumbar spine     Low back pain     Fracture of lumbar vertebra (H)     Vitamin D deficiency     Scheuermann's kyphosis     Nontraumatic compression fracture of T7 vertebra (H)     Cigarette nicotine dependence in remission     Acute pain of right hip     Kyphosis of thoracic region     Parathyroid adenoma     Hypertension, goal below 140/80     PVD (peripheral vascular disease) (H)     CVA (cerebral vascular accident) (H)     Barahona esophagus     Major depression, recurrent (H)     Stage 3a chronic kidney disease (H)     Arthropathy of right hip     Status post total hip replacement, right     Brain aneurysm     Iron deficiency anemia     Lactose intolerance     Pain medication agreement     History of colonic polyps     S/P lumbar spinal fusion     Lumbar radiculopathy     Lumbar pseudoarthrosis     Postoperative anemia     Urinary retention with  incomplete bladder emptying     Severe sepsis (H)     Acute cystitis without hematuria     Hypokalemia     LADARIUS (acute kidney injury) (H)     Bacteremia due to Escherichia coli     CAD (coronary artery disease)     Pseudophakia, both eyes     Senile osteoporosis     Ureteral stone     Xerostomia     Aftercare following right knee joint replacement surgery     Fall at home     Primary osteoarthritis of right knee     Synovial cyst of left popliteal space     S/P TKR (total knee replacement), right       Past Surgical History:   Procedure Laterality Date     APPENDECTOMY       ARTHROPLASTY REVISION HIP Right 10/6/2017    Procedure: REMOVAL OF TROCHANTERIC NAIL CONVERSION TO RIGHT TOTAL HIP ARTRHOPLASTY;  Surgeon: Seb Ortiz DO;  Location: [facility];  Service:      BACK SURGERY       CEREBRAL ANEURYSM REPAIR  2006    clipped     CEREBRAL ANEURYSM REPAIR       CERVICAL LAMINECTOMY      C6-7     CHOLECYSTECTOMY       EYE SURGERY Bilateral     cat     FEMORAL BYPASS  1995    left     FRACTURE SURGERY Right     hip pinning     HEMORRHOID SURGERY       HYSTERECTOMY      BSO     LUMBAR FUSION      L3-4, L4-5     OTHER SURGICAL HISTORY      right wrist closed reduction     PARATHYROIDECTOMY N/A 12/30/2016    Procedure: NECK EXPLORATION FOR PARATHYROID ;  Surgeon: Gilberto Snowden MD;  Location: [facility];  Service:        Family History   Problem Relation Age of Onset     Cerebral aneurysm Sister        Social history: Patient is .  She lives alone in a house with her cat.  She denies tobacco use.  Drinks alcohol occasionally.  Denies illicit drug use.      ROS: Positive for headache, reflux, itching, fainting, easy bruising, excessive tiredness, anxiety, depression.  Specifically negative for bowel/bladder dysfunction, fevers,chills, appetite changes, unexplained weight loss.   Otherwise 13 systems reviewed are negative.  Please see the patient's intake questionnaire from today for  details.      OBJECTIVE:  PHYSICAL EXAMINATION:    CONSTITUTIONAL:  Vital signs as above.  No acute distress.  The patient is well nourished and well groomed.  PSYCHIATRIC:  The patient is awake, alert, oriented to person, place, time and answering questions appropriately with clear speech.    HEENT: Normocephalic, atraumatic.  Sclera clear.  Neck is supple.  SKIN:  Skin over the face, bilateral lower extremities, and posterior torso is clean, dry, intact without rashes.    GAIT:  Gait is severely antalgic, favoring the right.  Right foot everted.  The patient is able to rise onto toes and heels bilaterally with support.  STANDING EXAMINATION: Patient is a severe thoracic kyphotic deformity.  Tender to palpation bilateral mid and lower  lumbar paraspinous muscles.  Tender to palpation bilateral sacroiliac joints.  Tender to palpation midline thoracic spine at approximately T8.  MUSCLE STRENGTH:  The patient has 5/5 strength for the bilateral hip flexors, knee flexors/extensors, ankle dorsiflexors/plantar flexors, great toe extensors, ankle evertors/invertors.  NEUROLOGICAL: 2+ patellar, and 1+ achilles reflexes bilaterally.  Negative Babinski's bilaterally.  No ankle clonus bilaterally. Sensation to light touch is intact in the bilateral L4, L5, and S1 dermatomes.  VASCULAR:  2/4 dorsalis pedis pulses bilaterally.  Bilateral lower extremities are warm.  There is no pitting edema of the bilateral lower extremities.  ABDOMINAL:  Soft, non-distended, non-tender throughout all quadrants.  No pulsatile mass palpated in the left lower quadrant.  LYMPH NODES:  No palpable or tender inguinal lymph nodes.  MUSCULOSKELETAL: Straight leg raise negative bilaterally.    RESULTS:    I reviewed the full body radiographs from [place] dated 10/21/21.  This shows post-op changes of L3-S1 fusion.  There is exaggerated kyphotic curvature of the thoracic spine with very positive global coronal imbalance.  There is a mild convex left  curvature apex T11/12.    I reviewed the MRI lumbar spine with and without contrast from [facility] dated July 14, 2020.  This shows postoperative changes of fusion L3-S1 with laminectomy L4 and L5.  At L2-3 there is a focal central disc protrusion and moderate facet arthropathy contributing to moderate spinal canal stenosis with moderate right and mild left foraminal stenosis.  Please see report for further details.    I reviewed a CT lumbar spine from [facility] dated July 2, 2020.  This shows postoperative changes of L3-S1 fusion.  There is lucency about the left S1 screw suggesting loosening.  There is some osseous fusion across the intradiscal fusion devices L4-5 and L5-S1.

## 2021-11-08 NOTE — PATIENT INSTRUCTIONS
Bilateral L2/3 epidural steroid injection has been ordered today.      Please note that this injection uses cortisone.  The cortisone may somewhat weaken the immune system.  It is unknown how much the immune system is weakened.  It is unknown if it is weakened to the point that you may be more likely to get the COVID-19 virus, or if you do get the COVID-19 virus, if you would be sicker than you would have been if you had not had the cortisone injection.  If you do not wish to proceed with the injection, please let the nurse/physician know and do NOT schedule the injection.    Please note that since your immune system is weakened from the cortisone, having a flu vaccine/shot may be less effective if you have this vaccine within 2 weeks from your cortisone injection.  It is advised to wait 2 weeks after your cortisone injection to have the flu shot (or if you have the flu shot first, wait 2 weeks before you have the cortisone injection).    Please schedule this injection at least 1 week  from now to allow time for insurance prior authorization.  On the day of your injection, you cannot be sick or taking antibiotics.  If you become sick and are prescribed, please call the clinic so your injection can be rescheduled for once you have completed your antibiotics.  You will need to bring a  with you for your injection.   If you have any questions or concerns prior to your injection, please do not hesitate to call the nurse navigation line at [phone] or contact Joyce Lazaro through [employer].

## 2021-11-08 NOTE — LETTER
11/8/2021         RE: Susan Patrick  [address] [id number]        Dear Colleague,    Thank you for referring your patient, Susan Patrick, to the [facility] [place]. Please see a copy of my visit note below.    ASSESSMENT: Susan Patrick is a 75 year old female with past medical history significant for history of CVA, vitamin D deficiency, osteoporosis, Barahona esophagus, hypertension, peripheral vascular disease, coronary artery disease (on Plavix), chronic kidney disease, depression who presents today for new patient evaluation of chronic and worsening bilateral low back pain with radiation up the midline thoracic spine.  Patient has a history of a prior L3-L5 instrumented fusion by Dr. Mae Fisher with pseudoarthrosis and then subsequent L4-5, L5-S1 instrumented TLIF November 9, 2018 with Dr. Walsh.  Patient has thoracic kyphotic deformity with very positive global coronal imbalance seen on her total spine x-ray which is thought to be a significant etiology for her pain.  An MRI lumbar spine from July 2020 also showed moderate spinal stenosis L2-3 related to a disc protrusion and moderate facet arthropathy.  Patient does endorse limited walking and standing tolerance which could be related to the lumbar spinal stenosis.  -Patient saw Dr. Gastelum October 21, 2021.  Dr. Gastelum felt that the risk of surgical intervention for spinal deformity correction is far greater than the potential benefit and did not recommend surgical intervention for her spine.    PLAN:  A shared decision making model was used.  The patient's values and choices were respected.  The following represents what was discussed and decided upon by the physician assistant and the patient.      1.  DIAGNOSTIC TESTS: I reviewed the MRI lumbar spine from July 2020.  I reviewed the CT lumbar spine from July 2020.  I also reviewed the spine x-rays from October 2021.  No further diagnostic tests were  ordered.    2.  PHYSICAL THERAPY: Patient is scheduled to begin physical therapy November 24, 2021.    3.  MEDICATIONS: No changes are made to the patient's medications.  -Patient takes Tylenol as needed.  -Patient uses gabapentin 100 mg 3 times daily.  -Patient applies icy hot patches as needed.  -Patient is interested in finding out if she may qualify for medical marijuana.  She was referred to the pain clinic by neurosurgery but has not yet heard from them to schedule.  She was given their phone number today.    4.  INTERVENTIONS:    -I offer the patient bilateral L2-3 transforaminal epidural steroid injections.  Patient indicated she would like to proceed and an order was placed.  She is scheduled to get her Covid booster November 17, 2021.  She will schedule this procedure for on or after December 2, 2021.  -If that does not help, we could consider bilateral L1, L2 medial branch blocks to determine if there is facet mediated pain from the L2-3 level adjacent to her fusion.  -If all else fails she may be a candidate for spinal cord stimulator.    5.  PATIENT EDUCATION: Patient is in agreement the above plan.  All questions were answered.    6.  FOLLOW-UP:   Patient will follow up with me 2 weeks after her bilateral L2-3 transforaminal epidural steroid injections.  If she has questions or concerns in the meantime, she should not hesitate to call.      SUBJECTIVE:  Susan Patrick  Is a 75 year old female who presents today in consultation at the request of Comfort Jones CNP for new patient evaluation of low back pain with radiation of the thoracic spine.  Patient has a history of lumbar fusion x2, most recently with Dr. Walsh in 2018.  Patient reports that she had some improvement in her pain following surgery but then pain got worse again.  She feels like it is gradually worsening.  She was recently seen by Dr. Gastelum who did not recommend any additional surgical intervention.  She was then referred to us to  discuss nonsurgical options for her pain.    Patient complains of bilateral low back pain.  Pain spans across the low back in a broadband distribution.  Both sides are equally painful.  The pain then radiates up the midline of her thoracic spine to between her shoulder blades.  Patient describes the lower back as feeling like a chronic ache with a pressure discomfort.  She denies any pain radiating in the buttocks or down the legs.  She states that she has intermittent left leg tingling in her calf when she is lying in bed and she has to get out of bed and walk around to alleviate that.  Otherwise she denies numbness or tingling.  She states that her legs feel weak and tired with walking.  She states that she has to lean further and further forward to alleviate this.  Her pain is aggravated with walking.  She estimates she can walk 2 blocks before she has to stop and rest.  Standing is limited to 15 minutes.  Pain is also aggravated bending forward, combing her hair, and she feels worse when she wears a bra.  Pain is alleviated with sitting and applying he denies.  Patient notes that her activity is very restricted because of her pain.  She has to order her groceries because she cannot do the walking, although she states hanging onto a shopping cart helps.  She denies loss of bowel or bladder control.  Denies any recent fevers, chills, sweats.    Patient has not had any physical therapy for her lower back.  She is scheduled to begin physical therapy November 24.  She had injections in her low back before her first fusion surgery but she has not had any more recent injections.  She does not go to a chiropractor.  She uses gabapentin 100 mg 3 times daily.  She takes Tylenol as needed.  She does not feel like these are very helpful.  She applies icy hot patches as needed which provide slight relief of her pain.    Current Outpatient Medications   Medication     acetaminophen (TYLENOL) 500 MG tablet     alendronate  (FOSAMAX) 70 MG tablet     amLODIPine (NORVASC) 10 MG tablet     aspirin 81 mg chewable tablet     calcium carbonate-vitamin D3 600 mg(1,500mg) -800 unit per tablet     cholecalciferol, vitamin D3, 1,000 unit tablet     clopidogrel (PLAVIX) 75 mg tablet     cyanocobalamin 1000 MCG tablet     esomeprazole (NEXIUM) 20 MG capsule     gabapentin (NEURONTIN) 100 MG capsule     hydroCHLOROthiazide (HYDRODIURIL) 25 MG tablet     HYDROcodone-acetaminophen 5-325 mg per tablet     lisinopriL (PRINIVIL,ZESTRIL) 40 MG tablet     mirtazapine (REMERON) 30 MG tablet     MULTIVITAMIN ORAL     pravastatin (PRAVACHOL) 40 MG tablet     senna (SENOKOT) 8.6 mg tablet     sertraline (ZOLOFT) 50 MG tablet         Allergies   Allergen Reactions     Lactose Other (See Comments)     Diarrhea; Lactose intolerant     Metoprolol Unknown     Bradycardia, heart rate in 30's      Percocet [Oxycodone-Acetaminophen] Other (See Comments)     hallucinations     Augmentin Hives, Swelling and Rash     Caused sores, facial swelling       Past Medical History:   Diagnosis Date     Acute cystitis without hematuria      Acute pain of right hip 11/14/2016    Formatting of this note might be different from the original. December 2016, Seen at [facility] by neurosurgery, pain confirmed to be coming from her hip after an injection, referred to orthopedist. 6/2017 note from the spine specialist. Dr. Saskia Gastelum. Now seeing Dr. Ortiz at [facility] for hip pain. may need replacement.     Aftercare following right knee joint replacement surgery 12/18/2012    Formatting of this note might be different from the original. Patient has identified Health Care Agent(s): Yes Add Health Care Agents: Yes   Health Care Agent(s): Primary Health Care Agent: Charlene James Relationship: daughter Phone: [phone]  Secondary Health Care Agent: Ian Patrick  Relationship: son Phone:[phone]    Patient has Advance Care Plan Documents (Health Care Directive, POLS      LADARIUS (acute kidney injury) (H)      Anemia     Iron deficiency     Anxiety      Arthritis      Arthropathy of right hip 6/13/2017     Bacteremia due to Escherichia coli 12/20/2018     Barahona esophagus 2/9/2012    Overview:  2010, patient recalls scope, patient recalls was told to recheck in 5 years.  Uses Reglan, once daily 5/14/2013 Barahona's, repeat 3 years 12/2015 Barretts without dysplasia, repeat in 3 years.   Formatting of this note might be different from the original. 2010, patient recalls scope, patient recalls was told to recheck in 5 years.  Uses Reglan, once daily 5/14/2013 Barahona's, repeat 3      Barahona's esophagus      Brain aneurysm 8/1/2006    Overview:  Left  Thrombotic--aneursym clip  Formatting of this note might be different from the original. Left  Thrombotic--aneursym clip  Formatting of this note might be different from the original. brain aneurysm 2006 aug, left with right leg weakness     CAD (coronary artery disease) 3/7/2012    Overview:  Noted in old records, normal nuclear stress test 2010   Formatting of this note might be different from the original. Noted in old records, normal nuclear stress test 2010     Cerebral aneurysm     right leg weakness residual     Chronic kidney disease     CKD stage 3     Cigarette nicotine dependence in remission 11/14/2016     Colon polyps     past hx.     Coronary artery disease      CVA (cerebral vascular accident) (H) 12/30/2016     Depression      Depression with anxiety 10/18/2011    Overview:   Formatting of this note might be different from the original.     Fall at home 12/30/2019     Fracture of lumbar vertebra (H) 4/5/2016    Overview:  Overview:  Bilateral L2 pedicle fracture Bilateral L2 pedicle fracture Replacement Utility updated for latest IMO load   Formatting of this note might be different from the original. Overview:  Bilateral L2 pedicle fracture     GERD (gastroesophageal reflux disease)      History of blood clots       "History of colonic polyps 2/9/2012    Overview:  Last colonoscopy 9/22/9009 normal. Repeat recommended in 5 years.  5/16/2013 small polyp. Follow up  5 years.  12/15/2015 repeat colonoscopy normal. \"prep not great\".   Formatting of this note might be different from the original. Last colonoscopy 9/22/9009 normal. Repeat recommended in 5 years.  5/16/2013 small polyp. Follow up  5 years.  12/15/2015 repeat colonoscopy normal. \"prep n     History of transfusion      Hyperlipidemia      Hypertension      Hypertension, goal below 140/80 12/30/2016     Hypokalemia      Iron deficiency anemia 2/22/2016    Overview:  12/2015 EGD and colonoscopy did not show significant source of bleeding.  Only gastritis seen on upper endoscopy  Formatting of this note might be different from the original. 12/2015 EGD and colonoscopy did not show significant source of bleeding.  Only gastritis seen on upper endoscopy 2/3/2020 colonoscopy showed only a hyperplastic polyp  2021- Follows with Dr. Alonzo for iron infusi     Kyphosis of thoracic region 11/14/2016     Lactose intolerance      Low back pain 4/5/2016     Lumbar pseudoarthrosis      Lumbar radiculopathy 11/9/2018     Lumbar vertebral fracture (H)      Major depression, recurrent (H) 12/30/2016     Nontraumatic compression fracture of T7 vertebra (H) 11/14/2016     Osteoporosis      Pain medication agreement 3/13/2015    Overview:  Takes one Norco at bedtime for knee pain Started trial of Tramadol     Parathyroid adenoma      Postoperative anemia      Primary osteoarthritis of right knee 2/10/2021     Pseudoarthrosis of lumbar spine 4/5/2016     Pseudophakia, both eyes 5/19/2017     PVD (peripheral vascular disease) (H)      Right hip pain      S/P lumbar spinal fusion 12/5/2012    Overview:  Dr. Rooney, 7/2012 4/2016 Seen by Dr. Saskia Gastelum neurosurgery at [facility], needs repeat surgery, spontaneous pedicle fractures,  but treatment first for osteoporosis with Foteo.   " Formatting of this note might be different from the original. Dr. Rooney, 7/2012 4/2016 Seen by Dr. Saskia Gastelum neurosurgery at [facility], needs repeat surgery, spontaneous pedicle fr     S/P TKR (total knee replacement), right 4/26/2021     Scheuermann's kyphosis 7/19/2016     Senile osteoporosis 5/9/2012    Overview:   history of Barahona's so can not take bisphosphonates. Referred to endocrinology. 5/23/2012 seen by Dr. Stout, recommended IV reclast, at some point, timing will not affect upcoming spine surgery.  Seen by Dr. Denise, treated with Reclast 6/25/2014, 7/9/2015 Referred to Dr. Lala by neurosurgery Dr. Saskia Gastelum at [facility]. due to the need for repeat spinal surgery and persi     Severe sepsis (H) 12/16/2018     Stage 3a chronic kidney disease (H) 12/30/2016     Status post total hip replacement, right 10/6/2017     Stroke (H)     cerebral infarction-aneurysm     Synovial cyst of left popliteal space 2/10/2021     Ureteral stone 10/31/2018    Overview:  Treated with stent 4/2018 Dr. Dozier   Formatting of this note might be different from the original. Treated with stent 4/2018 Dr. Dozier     Urinary retention with incomplete bladder emptying 11/26/2018     Vitamin D deficiency      Xerostomia     dryness in mouth        Patient Active Problem List   Diagnosis     Pseudoarthrosis of lumbar spine     Low back pain     Fracture of lumbar vertebra (H)     Vitamin D deficiency     Scheuermann's kyphosis     Nontraumatic compression fracture of T7 vertebra (H)     Cigarette nicotine dependence in remission     Acute pain of right hip     Kyphosis of thoracic region     Parathyroid adenoma     Hypertension, goal below 140/80     PVD (peripheral vascular disease) (H)     CVA (cerebral vascular accident) (H)     Barahona esophagus     Major depression, recurrent (H)     Stage 3a chronic kidney disease (H)     Arthropathy of right hip     Status post total hip replacement, right      Brain aneurysm     Iron deficiency anemia     Lactose intolerance     Pain medication agreement     History of colonic polyps     S/P lumbar spinal fusion     Lumbar radiculopathy     Lumbar pseudoarthrosis     Postoperative anemia     Urinary retention with incomplete bladder emptying     Severe sepsis (H)     Acute cystitis without hematuria     Hypokalemia     LADARIUS (acute kidney injury) (H)     Bacteremia due to Escherichia coli     CAD (coronary artery disease)     Pseudophakia, both eyes     Senile osteoporosis     Ureteral stone     Xerostomia     Aftercare following right knee joint replacement surgery     Fall at home     Primary osteoarthritis of right knee     Synovial cyst of left popliteal space     S/P TKR (total knee replacement), right       Past Surgical History:   Procedure Laterality Date     APPENDECTOMY       ARTHROPLASTY REVISION HIP Right 10/6/2017    Procedure: REMOVAL OF TROCHANTERIC NAIL CONVERSION TO RIGHT TOTAL HIP ARTRHOPLASTY;  Surgeon: Seb Ortiz DO;  Location: [facility];  Service:      BACK SURGERY       CEREBRAL ANEURYSM REPAIR  2006    clipped     CEREBRAL ANEURYSM REPAIR       CERVICAL LAMINECTOMY      C6-7     CHOLECYSTECTOMY       EYE SURGERY Bilateral     cat     FEMORAL BYPASS  1995    left     FRACTURE SURGERY Right     hip pinning     HEMORRHOID SURGERY       HYSTERECTOMY      BSO     LUMBAR FUSION      L3-4, L4-5     OTHER SURGICAL HISTORY      right wrist closed reduction     PARATHYROIDECTOMY N/A 12/30/2016    Procedure: NECK EXPLORATION FOR PARATHYROID ;  Surgeon: Gilberto Snowden MD;  Location: [facility];  Service:        Family History   Problem Relation Age of Onset     Cerebral aneurysm Sister        Social history: Patient is .  She lives alone in a house with her cat.  She denies tobacco use.  Drinks alcohol occasionally.  Denies illicit drug use.      ROS: Positive for headache, reflux, itching, fainting, easy bruising, excessive  tiredness, anxiety, depression.  Specifically negative for bowel/bladder dysfunction, fevers,chills, appetite changes, unexplained weight loss.   Otherwise 13 systems reviewed are negative.  Please see the patient's intake questionnaire from today for details.      OBJECTIVE:  PHYSICAL EXAMINATION:    CONSTITUTIONAL:  Vital signs as above.  No acute distress.  The patient is well nourished and well groomed.  PSYCHIATRIC:  The patient is awake, alert, oriented to person, place, time and answering questions appropriately with clear speech.    HEENT: Normocephalic, atraumatic.  Sclera clear.  Neck is supple.  SKIN:  Skin over the face, bilateral lower extremities, and posterior torso is clean, dry, intact without rashes.    GAIT:  Gait is severely antalgic, favoring the right.  Right foot everted.  The patient is able to rise onto toes and heels bilaterally with support.  STANDING EXAMINATION: Patient is a severe thoracic kyphotic deformity.  Tender to palpation bilateral mid and lower  lumbar paraspinous muscles.  Tender to palpation bilateral sacroiliac joints.  Tender to palpation midline thoracic spine at approximately T8.  MUSCLE STRENGTH:  The patient has 5/5 strength for the bilateral hip flexors, knee flexors/extensors, ankle dorsiflexors/plantar flexors, great toe extensors, ankle evertors/invertors.  NEUROLOGICAL: 2+ patellar, and 1+ achilles reflexes bilaterally.  Negative Babinski's bilaterally.  No ankle clonus bilaterally. Sensation to light touch is intact in the bilateral L4, L5, and S1 dermatomes.  VASCULAR:  2/4 dorsalis pedis pulses bilaterally.  Bilateral lower extremities are warm.  There is no pitting edema of the bilateral lower extremities.  ABDOMINAL:  Soft, non-distended, non-tender throughout all quadrants.  No pulsatile mass palpated in the left lower quadrant.  LYMPH NODES:  No palpable or tender inguinal lymph nodes.  MUSCULOSKELETAL: Straight leg raise negative bilaterally.    RESULTS:     I reviewed the full body radiographs from [facility] dated 10/21/21.  This shows post-op changes of L3-S1 fusion.  There is exaggerated kyphotic curvature of the thoracic spine with very positive global coronal imbalance.  There is a mild convex left curvature apex T11/12.    I reviewed the MRI lumbar spine with and without contrast from [facility] dated July 14, 2020.  This shows postoperative changes of fusion L3-S1 with laminectomy L4 and L5.  At L2-3 there is a focal central disc protrusion and moderate facet arthropathy contributing to moderate spinal canal stenosis with moderate right and mild left foraminal stenosis.  Please see report for further details.    I reviewed a CT lumbar spine from [facility] dated July 2, 2020.  This shows postoperative changes of L3-S1 fusion.  There is lucency about the left S1 screw suggesting loosening.  There is some osseous fusion across the intradiscal fusion devices L4-5 and L5-S1.      Again, thank you for allowing me to participate in the care of your patient.        Sincerely,        Joyce Lazaro PA-C

## 2021-11-24 ENCOUNTER — HOSPITAL ENCOUNTER (OUTPATIENT)
Dept: PHYSICAL THERAPY | Facility: REHABILITATION | Age: 76
End: 2021-11-24
Attending: NURSE PRACTITIONER
Payer: MEDICARE

## 2021-11-24 DIAGNOSIS — M40.204 KYPHOSIS OF THORACIC REGION, UNSPECIFIED KYPHOSIS TYPE: ICD-10-CM

## 2021-11-24 DIAGNOSIS — S32.009K PSEUDOARTHROSIS OF LUMBAR SPINE: ICD-10-CM

## 2021-11-24 PROCEDURE — 97110 THERAPEUTIC EXERCISES: CPT | Mod: GP

## 2021-11-24 PROCEDURE — 97161 PT EVAL LOW COMPLEX 20 MIN: CPT | Mod: GP

## 2021-11-24 NOTE — PROGRESS NOTES
[facility]    OUTPATIENT PHYSICAL THERAPY ORTHOPEDIC EVALUATION  PLAN OF TREATMENT FOR OUTPATIENT REHABILITATION  (COMPLETE FOR INITIAL CLAIMS ONLY)  Patient's Last Name, First Name, M.I.  YOB: 1945  DarnellSusan  KEANU    Provider s Name:  [facility]   Medical Record No.  [id number]   Start of Care Date:  11/24/21   Onset Date:      Type:     _X__PT   ___OT   ___SLP Medical Diagnosis:  S32.009K (ICD-10-CM) - Pseudoarthrosis of lumbar spine M40.204 (ICD-10-CM) - Kyphosis of thoracic region, unspecified kyphosis type     PT Diagnosis:  Low Back and thoracic pain, generalized weakness, decreased trunk and hip ROM, postural deficits, balance impairment    Visits from SOC:  1      _________________________________________________________________________________  Plan of Treatment/Functional Goals:  ADL retraining,balance training,gait training,manual therapy,neuromuscular re-education,ROM,strengthening,stretching,motor coordination training,joint mobilization     Biofeedback,Electrical stimulation,Cryotherapy,TENS,Traction,Ultrasound,Hot packs     Goals  Goal Identifier: HEP  Goal Description: Patient will demonstrate independence with home exercises to facilitate improvement in function.   Target Date: 12/29/21    Goal Identifier: Standing  Goal Description: Patient will be able to tolerate standing for at least 20 minutes with no discomfort to improve ability in completing upright ADLs such as cooking and cleaning.   Target Date: 02/02/22    Goal Identifier: Strength   Goal Description: Patient will improve LE and hip strength to at least 4+/5 to improve functional mobility and strength.   Target Date: 02/02/22               Therapy Frequency:  1 time/week  Predicted Duration of Therapy Intervention:  10 weeks     Scott Parmer, PT, DPT                 I CERTIFY THE  NEED FOR THESE SERVICES FURNISHED UNDER        THIS PLAN OF TREATMENT AND WHILE UNDER MY CARE     (Physician co-signature of this document indicates review and certification of the therapy plan).                       Certification Date From:  11/24/21   Certification Date To:  02/21/22    Referring Provider:  CHANDLER Wellington CNP     Initial Assessment        See Epic Evaluation Start of Care Date: 11/24/21 11/24/21 1300   General Information   Type of Visit Initial OP Ortho PT Evaluation   Start of Care Date 11/24/21   Referring Physician CHANDLER Wellington CNP    Orders Evaluate and Treat   Date of Order 11/02/21   Certification Required? Yes   Medical Diagnosis S32.009K (ICD-10-CM) - Pseudoarthrosis of lumbar spine M40.204 (ICD-10-CM) - Kyphosis of thoracic region, unspecified kyphosis type   Surgical/Medical history reviewed Yes   General Information Comments Appointment slightly abbreviated due to patient arriving about 5 minutes late and then having to fill out intake paperwork        Present No   Body Part(s)   Body Part(s) Hip;Lumbar Spine/SI   Presentation and Etiology   Pertinent history of current problem (include personal factors and/or comorbidities that impact the POC) Patient reports some low back pain. Patient has had multiple back surgeries, and has persistent severe pain. The pain starts low and will often work its way up the back. Will occaionally feel radiating pain but this is rare. Patient had R TKA in April. Functionally, patient has difficulty with sleeping, walking, standing, and sitting. Pain gets worse throughout the day. Patient has kept up with some knee exercises that she got in therapy.    Impairments A. Pain;D. Decreased ROM;E. Decreased flexibility;G. Impaired balance;H. Impaired gait   Functional Limitations perform activities of daily living   How/Where did it occur From insidious onset   Chronicity Chronic   Pain rating  (0-10 point scale) Best (/10);Worst (/10)   Best (/10) 4   Worst (/10) 8   Pain quality C. Aching;B. Dull   Frequency of pain/symptoms C. With activity   Pain/symptoms are: Worse during the day   Pain/symptoms exacerbated by A. Sitting;B. Walking;C. Lifting;D. Carrying;G. Certain positions;I. Bending;K. Home tasks;J. ADL   Pain/symptoms eased by C. Rest;G. Heat;I. OTC medication(s)   Progression of symptoms since onset: Unchanged   Current / Previous Interventions   Diagnostic Tests: MRI   MRI Results Results   MRI results 1.  Postsurgical changes laminectomies at L4 and L5, posterior fusion from L3-S1, and interbody grafts at L4/L5 and L5/S1 with associated susceptibility.   Fall Risk Screen   Fall screen completed by PT   Have you fallen 2 or more times in the past year? No   Have you fallen and had an injury in the past year? No   Is patient a fall risk? No   Abuse Screen (yes response referral indicated)   Feels Unsafe at Home or Work/School no   Feels Threatened by Someone no   Does Anyone Try to Keep You From Having Contact with Others or Doing Things Outside Your Home? no   Physical Signs of Abuse Present no   System Outcome Measures   Outcome Measures Low Back Pain (see Oswestry and Barney)  (58%)   Lumbar Spine/SI Objective Findings   Observation Patient is antalgic and stiff upon initial observation    Integumentary no deficits noted    Posture Forward head, forward shoulders, thoracic kyphosis, anterior pelvic tilt    Gait/Locomotion Ambulating with single point cane; slow gait speed; increased time in double limb support    Balance/Proprioception (Single Leg Stance) Tandem: unable due to stiffness and pain   Flexion ROM 14 inches   Extension ROM severe loss with pain   Right Side Bending ROM fingertips to mid thigh with pain   Left Side Bending ROM fingertips to mid thigh with pain   Lumbar ROM Comment Overall very stiff and guarded    Hip Flexion (L2) Strength 4/5 R, 3+/5 L pain B    Hip Abduction  Strength 3/5 B    Hip Extension Strength 3/5 B    Knee Flexion Strength 4/5 B    Knee Extension (L3) Strength 4/5 B   Ankle Dorsiflexion (L4) Strength 5/5 B   Great Toe Extension (L5) Strength 5/5 B   Ankle Plantar Flexion (S1) Strength 5/5 B    Hamstring Flexibility Difficult to assess due to guarding and pain, very limited B    Obers Flexibility (-) for ITB/TFL tightness    Piriformis Flexibility tightness noted B    SLR (+) B for increases in back pain   Slump Test (+) L    Sensation Testing Intact through dermatomal pattern B    Palpation Tenderness to palpation through B lumbar paraspinals, thoracic paraspinals, gluteals   Planned Therapy Interventions   Planned Therapy Interventions ADL retraining;balance training;gait training;manual therapy;neuromuscular re-education;ROM;strengthening;stretching;motor coordination training;joint mobilization   Planned Modality Interventions   Planned Modality Interventions Biofeedback;Electrical stimulation;Cryotherapy;TENS;Traction;Ultrasound;Hot packs   Clinical Impression   Criteria for Skilled Therapeutic Interventions Met yes, treatment indicated   PT Diagnosis Low Back and thoracic pain, generalized weakness, decreased trunk and hip ROM, postural deficits, balance impairment    Functional limitations due to impairments Sitting, standing, walking, bending, lifting, twisting, upright ADLs, sleeping    Clinical Presentation Stable/Uncomplicated   Clinical Decision Making (Complexity) Low complexity   Therapy Frequency 1 time/week   Predicted Duration of Therapy Intervention (days/wks) 10 weeks    Risk & Benefits of therapy have been explained Yes   Patient, Family & other staff in agreement with plan of care Yes   Clinical Impression Comments Patient is a 75 year old female presenting to physical therapy with B non-radiating low back pain and thoracic pain that has been chronic in nature. Patient has a hx of several back surgeries and continues to have significant pain  and stiffness. Mobility was difficult to assess today due to the severity of patient's pain and stiffness. Functionally, patient has difficulty sleeping, sitting, and walking. She also presents with balance impairments likely secondary to pain and generalized leg weakness with a recent hx of a R TKA in April of 2021. Patient would benefit from skilled PT services to address limitations and improve function.    ORTHO GOALS   PT Ortho Eval Goals 1;2;3   Ortho Goal 1   Goal Identifier HEP   Goal Description Patient will demonstrate independence with home exercises to facilitate improvement in function.    Target Date 12/29/21   Ortho Goal 2   Goal Identifier Standing   Goal Description Patient will be able to tolerate standing for at least 20 minutes with no discomfort to improve ability in completing upright ADLs such as cooking and cleaning.    Target Date 02/02/22   Ortho Goal 3   Goal Identifier Strength    Goal Description Patient will improve LE and hip strength to at least 4+/5 to improve functional mobility and strength.    Target Date 02/02/22   Total Evaluation Time   PT Eval, Low Complexity Minutes (43290) 25   Therapy Certification   Certification date from 11/24/21   Certification date to 02/21/22   Medical Diagnosis S32.009K (ICD-10-CM) - Pseudoarthrosis of lumbar spine M40.204 (ICD-10-CM) - Kyphosis of thoracic region, unspecified kyphosis type     Scott Parmer, PT, DPT

## 2021-11-26 NOTE — ADDENDUM NOTE
Encounter addended by: Parmer, Scott, PT on: 11/26/2021 8:21 AM   Actions taken: Clinical Note Signed

## 2021-12-02 ENCOUNTER — ANCILLARY PROCEDURE (OUTPATIENT)
Dept: PHYSICAL MEDICINE AND REHAB | Facility: CLINIC | Age: 76
End: 2021-12-02
Attending: PHYSICIAN ASSISTANT
Payer: MEDICARE

## 2021-12-02 VITALS
TEMPERATURE: 98.4 F | OXYGEN SATURATION: 96 % | RESPIRATION RATE: 16 BRPM | DIASTOLIC BLOOD PRESSURE: 66 MMHG | HEART RATE: 72 BPM | SYSTOLIC BLOOD PRESSURE: 182 MMHG

## 2021-12-02 DIAGNOSIS — M48.061 SPINAL STENOSIS OF LUMBAR REGION, UNSPECIFIED WHETHER NEUROGENIC CLAUDICATION PRESENT: ICD-10-CM

## 2021-12-02 PROCEDURE — 64483 NJX AA&/STRD TFRM EPI L/S 1: CPT | Mod: 50 | Performed by: PHYSICAL MEDICINE & REHABILITATION

## 2021-12-02 RX ORDER — LIDOCAINE HYDROCHLORIDE 10 MG/ML
INJECTION, SOLUTION EPIDURAL; INFILTRATION; INTRACAUDAL; PERINEURAL
Status: COMPLETED | OUTPATIENT
Start: 2021-12-02 | End: 2021-12-02

## 2021-12-02 RX ORDER — DEXAMETHASONE SODIUM PHOSPHATE 10 MG/ML
INJECTION, SOLUTION INTRAMUSCULAR; INTRAVENOUS
Status: COMPLETED | OUTPATIENT
Start: 2021-12-02 | End: 2021-12-02

## 2021-12-02 RX ADMIN — LIDOCAINE HYDROCHLORIDE 5 ML: 10 INJECTION, SOLUTION EPIDURAL; INFILTRATION; INTRACAUDAL; PERINEURAL at 12:03

## 2021-12-02 RX ADMIN — DEXAMETHASONE SODIUM PHOSPHATE 10 MG: 10 INJECTION, SOLUTION INTRAMUSCULAR; INTRAVENOUS at 12:03

## 2021-12-02 ASSESSMENT — PAIN SCALES - GENERAL
PAINLEVEL: MILD PAIN (3)
PAINLEVEL: SEVERE PAIN (6)

## 2021-12-02 NOTE — PATIENT INSTRUCTIONS
Follow-up visit with ANDREW Medina in 2 weeks to discuss injection outcome and determine care plan going forward.       DISCHARGE INSTRUCTIONS    During office hours (8:00 a.m.- 4:00 p.m.) questions or concerns may be answered  by calling [facility] Navigation Nurses at  [phone].  Messages received after hours will be returned the following business day.      In the case of an emergency, please dial 911 or seek assistance at the nearest Emergency Room/Urgent Care facility.     All Patients:    ? You may experience an increase in your symptoms for the first 2 days (It may take anywhere between 2 days- 2 weeks for the steroid to have maximum effect).    ? You may use ice on the injection site, as frequently as 20 minutes each hour if needed.    ? You may take your pain medicine.    ? You may continue taking your regular medication after your injection. If you have had a Medial Branch Block you may resume pain medication once your pain diary is completed.    ? You may shower. No swimming, tub bath or hot tub for 48 hours.  You may remove your bandaid/bandage as soon as you are home.    ? You may resume light activities, as tolerated.    ? Resume your usual diet as tolerated.    ? It is strongly advised that you do not drive for 1-3 hours post injection.    ? If you have had oral sedation:  Do not drive for 8 hours post injection.      ? If you have had IV sedation:  Do not drive for 24 hours post injection.  Do not operate hazardous machinery or make important personal/business decisions for 24 hours.      POSSIBLE STEROID SIDE EFFECTS (If steroid/cortisone was used for your procedure)    -If you experience these symptoms, it should only last for a short period      Swelling of the legs                Skin redness (flushing)       Mouth (oral) irritation     Blood sugar (glucose) levels              Sweats                      Mood changes    Headache    Sleeplessness    Weakened immune system for up to 14  days, which could increase the risk of justyn the COVID-19 virus and/or experiencing more severe symptoms of the disease, if exposed.    Decreased effectiveness of the flu vaccine if given within 2 weeks of the steroid.         POSSIBLE PROCEDURE SIDE EFFECTS  -Call the Spine Center if you are concerned    Increased Pain             Increased numbness/tingling        Nausea/Vomiting            Bruising/bleeding at site        Redness or swelling                                                Difficulty walking        Weakness             Fever greater than 100.5    *In the event of a severe headache after an epidural steroid injection that is relieved by lying down, please call the [facility] to speak with a clinical staff member*

## 2021-12-02 NOTE — Clinical Note
12/2/2021         RE: Susan Patrick  [address] [id number]        Dear Colleague,    Thank you for referring your patient, Susan Patrick, to the [facility] [place]. Please see a copy of my visit note below.    No notes on file    Again, thank you for allowing me to participate in the care of your patient.        Sincerely,        Khushbu Trejo, DO

## 2021-12-14 NOTE — PROGRESS NOTES
Assessment:   Susan Patrick is a 75 year old y.o. female with past medical history significant for CVA, vitamin D deficiency, osteoporosis, Barahnoa esophagus, hypertension, peripheral vascular disease, coronary artery disease (on Plavix), chronic kidney disease, depression who presents today for follow-up regarding chronic and worsening bilateral low back pain with radiation up the midline thoracic spine.  Patient has a history of a prior L3-L5 instrumented fusion by Dr. Mae Fisher with pseudoarthrosis and then subsequent L4-5, L5-S1 instrumented TLIF November 9, 2018 with Dr. Walsh.  Patient has thoracic kyphotic deformity with very positive global coronal imbalance seen on her total spine x-ray which is thought to be a significant etiology for her pain.  An MRI lumbar spine from July 2020 also showed moderate spinal stenosis L2-3 related to a disc protrusion and moderate facet arthropathy.  Patient does endorse limited walking and standing tolerance which could be related to the lumbar spinal stenosis.  Patient status post bilateral L2-3 transforaminal epidural steroid injections December 2, 2021 which provided 60% relief of her pain for 1 to 2 days and after that only 30% relief of her pain.  She reports significant pain above and below her fusion.  On exam today she has significant tenderness palpation bilateral sacroiliac joints.  Suspect sacroiliac joint dysfunction and facet arthropathy are contributing to her pain.  -On exam she was also noted to be hyperreflexic in the bilateral upper and lower extremities.  Patient has not had any recent imaging of the cervical spine.  I would like to evaluate for possible cervical spinal stenosis contributing to her difficulty walking.  She has very poor balance/ataxic gait.  -Patient saw Dr. Gastelum October 21, 2021.  Dr. Gastelum felt that the risk of surgical intervention for spinal deformity correction is far greater than the potential benefit and did not recommend  surgical intervention for her spine.          Plan:     A shared decision making plan was used.  The patient's values and choices were respected.  The following represents what was discussed and decided upon by the physician assistant and the patient.      1.  DIAGNOSTIC TESTS:  I reviewed the MRI lumbar spine from July 2020.  I reviewed the CT lumbar spine from July 2020.  I also reviewed the spine x-rays from October 2021.  I ordered an MRI cervical spine for further evaluation.    2.  PHYSICAL THERAPY: Patient is currently in physical therapy.  She has had 1 session so far and has more sessions scheduled.  Encouraged to continue with physical therapy and the home exercises.    3.  MEDICATIONS:    -Patient takes Tylenol as needed.  -Patient uses gabapentin 100 mg 3 times daily.  I provided a new prescription and the dosage titration chart so that she can increase her dose up to 300 mg 3 times daily.  -Patient applies icy hot patches as needed.  -Patient reports that she has filled out paperwork for medical marijuana through the state and is awaiting their response.    4.  INTERVENTIONS:    -I offer the patient bilateral sacroiliac joint injections.  I chose these injections next because patient was actually most tender over the sacroiliac joints on exam today.  Patient indicated she would like to proceed and an order was placed.  -If that does not help, we could consider bilateral L1, L2 medial branch blocks to determine if there is facet mediated pain from the L2-3 level adjacent to her fusion.  -If all else fails she may be a candidate for spinal cord stimulator.    5.  PATIENT EDUCATION:  Patient is in agreement the above plan.  All questions were answered.    6.  FOLLOW-UP: Patient should follow up with me 2 weeks after her bilateral sacroiliac joint injections.  A nurse will call the patient with the results of her MRI cervical spine.  If she has questions or concerns in the meantime, she should not hesitate  to call.    Subjective:     Susan Patrick is a 75 year old female who presents today for follow-up regarding chronic bilateral low back pain with bilateral leg heaviness.  Patient is status post bilateral L2-3 transforaminal epidural steroid injections December 2, 2021.  Patient reports that for the first 1 to 2 days she felt 60% improvement in her symptoms.  After that, the relief waned and she now estimates her pain relief at 30%.    Patient complains of bilateral low back pain.  She has areas of pain both above and below her fusion.  She denies pain down the legs but states that her legs feel heavy with walking.  Symptoms are worse on the right than the left.  She states the weakness is worse when she first gets up from seated or first gets up from out of bed.  Pain in the back is worse with bending, prolonged sitting, prolonged walking.  She feels very off balance.  Applying a heating pad helps somewhat.    Patient is currently in physical therapy.  She has had 1 session so far.  She takes gabapentin 100 mg 3 times daily.  She does not think this is really doing anything.  Denies side effects.  She uses Tylenol and Aleve which is somewhat helpful.    Review of Systems:  Positive for numbness/tingling, weakness, pain worse at night, difficulty swallowing, unintentional weight loss.  Negative for loss of bowel/bladder control, inability to urinate, headache, trip/stumble/falls, difficulty with hand skills, fevers.     Objective:   CONSTITUTIONAL:  Vital signs as above.  No acute distress.  The patient is well nourished and well groomed.    PSYCHIATRIC:  The patient is awake, alert, oriented to person, place and time.  The patient is answering questions appropriately with clear speech.  Normal affect.  HEENT: Normocephalic, atraumatic.  Sclera clear.    SKIN:  Skin over the face, posterior torso, bilateral upper and lower extremities is clean, dry, intact without rashes.  VASCULAR: No significant lower extremity  edema.  MUSCULOSKELETAL:  Gait is very unsteady.  The patient is able to rise onto toes and heels with support.  Moderate tenderness over the bilateral lumbar paraspinal muscles at L2-3 and L5-S1.  More significant tenderness palpation bilateral sacroiliac joints.     The patient has 5/5 strength for the bilateral hip flexors, knee flexors/extensors, ankle dorsiflexors/plantar flexors, ankle evertors/invertors.    NEUROLOGICAL: 3+ biceps, triceps, brachioradialis, patellar reflexes, 1-2+ achilles reflexes which are symmetric bilaterally.  Negative Wilmar sign.  No ankle clonus bilaterally.  Sensation to light touch is intact in the bilateral L4, L5, and S1 dermatomes.       RESULTS:    I reviewed the full body radiographs from [facility] dated 10/21/21.  This shows post-op changes of L3-S1 fusion.  There is exaggerated kyphotic curvature of the thoracic spine with very positive global coronal imbalance.  There is a mild convex left curvature apex T11/12.     I reviewed the MRI lumbar spine with and without contrast from [facility] dated July 14, 2020.  This shows postoperative changes of fusion L3-S1 with laminectomy L4 and L5.  At L2-3 there is a focal central disc protrusion and moderate facet arthropathy contributing to moderate spinal canal stenosis with moderate right and mild left foraminal stenosis.  Please see report for further details.     I reviewed a CT lumbar spine from [facility] dated July 2, 2020.  This shows postoperative changes of L3-S1 fusion.  There is lucency about the left S1 screw suggesting loosening.  There is some osseous fusion across the intradiscal fusion devices L4-5 and L5-S1.

## 2021-12-16 ENCOUNTER — OFFICE VISIT (OUTPATIENT)
Dept: PHYSICAL MEDICINE AND REHAB | Facility: CLINIC | Age: 76
End: 2021-12-16
Payer: MEDICARE

## 2021-12-16 VITALS
HEART RATE: 80 BPM | BODY MASS INDEX: 24.84 KG/M2 | WEIGHT: 135 LBS | HEIGHT: 62 IN | SYSTOLIC BLOOD PRESSURE: 131 MMHG | DIASTOLIC BLOOD PRESSURE: 64 MMHG

## 2021-12-16 DIAGNOSIS — M47.816 LUMBAR FACET ARTHROPATHY: ICD-10-CM

## 2021-12-16 DIAGNOSIS — R26.89 BALANCE PROBLEMS: ICD-10-CM

## 2021-12-16 DIAGNOSIS — R29.2 HYPERREFLEXIA: ICD-10-CM

## 2021-12-16 DIAGNOSIS — M40.204 KYPHOSIS OF THORACIC REGION, UNSPECIFIED KYPHOSIS TYPE: ICD-10-CM

## 2021-12-16 DIAGNOSIS — M53.3 SACROILIAC JOINT PAIN: ICD-10-CM

## 2021-12-16 DIAGNOSIS — M48.061 SPINAL STENOSIS OF LUMBAR REGION, UNSPECIFIED WHETHER NEUROGENIC CLAUDICATION PRESENT: Primary | ICD-10-CM

## 2021-12-16 DIAGNOSIS — Z98.1 HISTORY OF LUMBAR FUSION: ICD-10-CM

## 2021-12-16 PROCEDURE — 99214 OFFICE O/P EST MOD 30 MIN: CPT | Performed by: PHYSICIAN ASSISTANT

## 2021-12-16 RX ORDER — GABAPENTIN 100 MG/1
CAPSULE ORAL
Qty: 270 CAPSULE | Refills: 0 | Status: SHIPPED | OUTPATIENT
Start: 2021-12-16

## 2021-12-16 ASSESSMENT — MIFFLIN-ST. JEOR: SCORE: 1050.61

## 2021-12-16 ASSESSMENT — PAIN SCALES - GENERAL: PAINLEVEL: SEVERE PAIN (6)

## 2021-12-16 NOTE — LETTER
12/16/2021         RE: Susan Patrick  [address] [facility] [address]        Dear Colleague,    Thank you for referring your patient, Susan Patrick, to the [facility] [place]. Please see a copy of my visit note below.    Assessment:   Susan Patrick is a 75 year old y.o. female with past medical history significant for CVA, vitamin D deficiency, osteoporosis, Barahona esophagus, hypertension, peripheral vascular disease, coronary artery disease (on Plavix), chronic kidney disease, depression who presents today for follow-up regarding chronic and worsening bilateral low back pain with radiation up the midline thoracic spine.  Patient has a history of a prior L3-L5 instrumented fusion by Dr. Mae Fisher with pseudoarthrosis and then subsequent L4-5, L5-S1 instrumented TLIF November 9, 2018 with Dr. Walsh.  Patient has thoracic kyphotic deformity with very positive global coronal imbalance seen on her total spine x-ray which is thought to be a significant etiology for her pain.  An MRI lumbar spine from July 2020 also showed moderate spinal stenosis L2-3 related to a disc protrusion and moderate facet arthropathy.  Patient does endorse limited walking and standing tolerance which could be related to the lumbar spinal stenosis.  Patient status post bilateral L2-3 transforaminal epidural steroid injections December 2, 2021 which provided 60% relief of her pain for 1 to 2 days and after that only 30% relief of her pain.  She reports significant pain above and below her fusion.  On exam today she has significant tenderness palpation bilateral sacroiliac joints.  Suspect sacroiliac joint dysfunction and facet arthropathy are contributing to her pain.  -On exam she was also noted to be hyperreflexic in the bilateral upper and lower extremities.  Patient has not had any recent imaging of the cervical spine.  I would like to evaluate for possible cervical spinal stenosis  contributing to her difficulty walking.  She has very poor balance/ataxic gait.  -Patient saw Dr. Gastelum October 21, 2021.  Dr. Gastelum felt that the risk of surgical intervention for spinal deformity correction is far greater than the potential benefit and did not recommend surgical intervention for her spine.          Plan:     A shared decision making plan was used.  The patient's values and choices were respected.  The following represents what was discussed and decided upon by the physician assistant and the patient.      1.  DIAGNOSTIC TESTS:  I reviewed the MRI lumbar spine from July 2020.  I reviewed the CT lumbar spine from July 2020.  I also reviewed the spine x-rays from October 2021.  I ordered an MRI cervical spine for further evaluation.    2.  PHYSICAL THERAPY: Patient is currently in physical therapy.  She has had 1 session so far and has more sessions scheduled.  Encouraged to continue with physical therapy and the home exercises.    3.  MEDICATIONS:    -Patient takes Tylenol as needed.  -Patient uses gabapentin 100 mg 3 times daily.  I provided a new prescription and the dosage titration chart so that she can increase her dose up to 300 mg 3 times daily.  -Patient applies icy hot patches as needed.  -Patient reports that she has filled out paperwork for medical marijuana through the [employer] and is awaiting their response.    4.  INTERVENTIONS:    -I offer the patient bilateral sacroiliac joint injections.  I chose these injections next because patient was actually most tender over the sacroiliac joints on exam today.  Patient indicated she would like to proceed and an order was placed.  -If that does not help, we could consider bilateral L1, L2 medial branch blocks to determine if there is facet mediated pain from the L2-3 level adjacent to her fusion.  -If all else fails she may be a candidate for spinal cord stimulator.    5.  PATIENT EDUCATION:  Patient is in agreement the above plan.  All questions  were answered.    6.  FOLLOW-UP: Patient should follow up with me 2 weeks after her bilateral sacroiliac joint injections.  A nurse will call the patient with the results of her MRI cervical spine.  If she has questions or concerns in the meantime, she should not hesitate to call.    Subjective:     Susan Patrick is a 75 year old female who presents today for follow-up regarding chronic bilateral low back pain with bilateral leg heaviness.  Patient is status post bilateral L2-3 transforaminal epidural steroid injections December 2, 2021.  Patient reports that for the first 1 to 2 days she felt 60% improvement in her symptoms.  After that, the relief waned and she now estimates her pain relief at 30%.    Patient complains of bilateral low back pain.  She has areas of pain both above and below her fusion.  She denies pain down the legs but states that her legs feel heavy with walking.  Symptoms are worse on the right than the left.  She states the weakness is worse when she first gets up from seated or first gets up from out of bed.  Pain in the back is worse with bending, prolonged sitting, prolonged walking.  She feels very off balance.  Applying a heating pad helps somewhat.    Patient is currently in physical therapy.  She has had 1 session so far.  She takes gabapentin 100 mg 3 times daily.  She does not think this is really doing anything.  Denies side effects.  She uses Tylenol and Aleve which is somewhat helpful.    Review of Systems:  Positive for numbness/tingling, weakness, pain worse at night, difficulty swallowing, unintentional weight loss.  Negative for loss of bowel/bladder control, inability to urinate, headache, trip/stumble/falls, difficulty with hand skills, fevers.     Objective:   CONSTITUTIONAL:  Vital signs as above.  No acute distress.  The patient is well nourished and well groomed.    PSYCHIATRIC:  The patient is awake, alert, oriented to person, place and time.  The patient is answering  questions appropriately with clear speech.  Normal affect.  HEENT: Normocephalic, atraumatic.  Sclera clear.    SKIN:  Skin over the face, posterior torso, bilateral upper and lower extremities is clean, dry, intact without rashes.  VASCULAR: No significant lower extremity edema.  MUSCULOSKELETAL:  Gait is very unsteady.  The patient is able to rise onto toes and heels with support.  Moderate tenderness over the bilateral lumbar paraspinal muscles at L2-3 and L5-S1.  More significant tenderness palpation bilateral sacroiliac joints.     The patient has 5/5 strength for the bilateral hip flexors, knee flexors/extensors, ankle dorsiflexors/plantar flexors, ankle evertors/invertors.    NEUROLOGICAL: 3+ biceps, triceps, brachioradialis, patellar reflexes, 1-2+ achilles reflexes which are symmetric bilaterally.  Negative Wilmar sign.  No ankle clonus bilaterally.  Sensation to light touch is intact in the bilateral L4, L5, and S1 dermatomes.       RESULTS:    I reviewed the full body radiographs from [place] dated 10/21/21.  This shows post-op changes of L3-S1 fusion.  There is exaggerated kyphotic curvature of the thoracic spine with very positive global coronal imbalance.  There is a mild convex left curvature apex T11/12.     I reviewed the MRI lumbar spine with and without contrast from [facility] dated July 14, 2020.  This shows postoperative changes of fusion L3-S1 with laminectomy L4 and L5.  At L2-3 there is a focal central disc protrusion and moderate facet arthropathy contributing to moderate spinal canal stenosis with moderate right and mild left foraminal stenosis.  Please see report for further details.     I reviewed a CT lumbar spine from [facility] dated July 2, 2020.  This shows postoperative changes of L3-S1 fusion.  There is lucency about the left S1 screw suggesting loosening.  There is some osseous fusion across the intradiscal fusion devices L4-5 and L5-S1.          Again, thank you for  allowing me to participate in the care of your patient.        Sincerely,        Joyce Lazaro PA-C

## 2021-12-16 NOTE — PATIENT INSTRUCTIONS
Prescribed Gabapentin today, 100 mg tablets, to be titrated up to 3 tablets 3 times a day as tolerated for your nerve pain. Please follow Gabapentin dosing chart below.    Gabapentin 100mg Dosing Chart    DATE  MORNING AFTERNOON BEDTIME    Day 1 0 0 1    Day 2 0 0 1    Day 3 0 0 1    Day 4 1 0 1    Day 5 1 0 1    Day 6 1 0 1    Day 7 1 1 1    Day 8 1 1 1    Day 9 1 1 1    Day 10 1 1 2    Day 11 1 1 2    Day 12 1 1 2    Day 13 2 1 2    Day 14 2 1 2    Day 15 2 1 2    Day 16 2 2 2    Day 17 2 2 2    Day 18 2 2 2    Day 19 2 2 3    Day 20 2 2 3    Day 21 2 2 3    Day 22 3 2 3    Day 23 3 2 3    Day 24 3 2 3    Day 25 3 3 3    Day 26 3 3 3    Day 27 3 3 3     Continue medication, taking 3 capsules three times daily  Please call if you have any questions regarding how to take your medication          Bilateral sacroiliac joint injections were been ordered today.      Please note that this injection uses cortisone.  The cortisone may somewhat weaken the immune system.  It is unknown how much the immune system is weakened.  It is unknown if it is weakened to the point that you may be more likely to get the COVID-19 virus, or if you do get the COVID-19 virus, if you would be sicker than you would have been if you had not had the cortisone injection.  If you do not wish to proceed with the injection, please let the nurse/physician know and do NOT schedule the injection.    Please note that since your immune system is weakened from the cortisone, having any vaccine/shot may be less effective if you have this vaccine within 2 weeks from your cortisone injection.  It is advised to wait 2 weeks after your cortisone injection to have any vaccine (or if you have a vaccine first, wait 2 weeks before you have the cortisone injection).    Please schedule this injection at least 1 week  from now to allow time for insurance prior authorization.  On the day of your injection, you cannot be sick or taking antibiotics.  If you become sick  and are prescribed, please call the clinic so your injection can be rescheduled for once you have completed your antibiotics.  You will need to bring a  with you for your injection.   If you have any questions or concerns prior to your injection, please do not hesitate to call the nurse navigation line at [phone] or contact Joyce Lazaro through [email or web address].

## 2021-12-20 ENCOUNTER — HOSPITAL ENCOUNTER (OUTPATIENT)
Dept: PHYSICAL THERAPY | Facility: REHABILITATION | Age: 76
End: 2021-12-20
Payer: MEDICARE

## 2021-12-20 DIAGNOSIS — M40.204 KYPHOSIS OF THORACIC REGION, UNSPECIFIED KYPHOSIS TYPE: ICD-10-CM

## 2021-12-20 DIAGNOSIS — S32.009K PSEUDOARTHROSIS OF LUMBAR SPINE: Primary | ICD-10-CM

## 2021-12-20 PROCEDURE — 97110 THERAPEUTIC EXERCISES: CPT | Mod: GP | Performed by: PHYSICAL THERAPIST

## 2021-12-27 ENCOUNTER — HOSPITAL ENCOUNTER (OUTPATIENT)
Dept: PHYSICAL THERAPY | Facility: REHABILITATION | Age: 76
End: 2021-12-27
Payer: MEDICARE

## 2021-12-27 DIAGNOSIS — S32.009K PSEUDOARTHROSIS OF LUMBAR SPINE: Primary | ICD-10-CM

## 2021-12-27 DIAGNOSIS — M40.204 KYPHOSIS OF THORACIC REGION, UNSPECIFIED KYPHOSIS TYPE: ICD-10-CM

## 2021-12-27 PROCEDURE — 97110 THERAPEUTIC EXERCISES: CPT | Mod: GP | Performed by: PHYSICAL THERAPIST

## 2021-12-31 ENCOUNTER — ANCILLARY PROCEDURE (OUTPATIENT)
Dept: PHYSICAL MEDICINE AND REHAB | Facility: CLINIC | Age: 76
End: 2021-12-31
Attending: PHYSICIAN ASSISTANT
Payer: MEDICARE

## 2021-12-31 VITALS
SYSTOLIC BLOOD PRESSURE: 136 MMHG | TEMPERATURE: 98.5 F | OXYGEN SATURATION: 98 % | RESPIRATION RATE: 74 BRPM | HEART RATE: 71 BPM | DIASTOLIC BLOOD PRESSURE: 48 MMHG

## 2021-12-31 DIAGNOSIS — M53.3 SACROILIAC JOINT PAIN: ICD-10-CM

## 2021-12-31 PROCEDURE — 27096 INJECT SACROILIAC JOINT: CPT | Mod: 50 | Performed by: PHYSICAL MEDICINE & REHABILITATION

## 2021-12-31 RX ORDER — ROPIVACAINE HYDROCHLORIDE 5 MG/ML
INJECTION, SOLUTION EPIDURAL; INFILTRATION; PERINEURAL
Status: COMPLETED | OUTPATIENT
Start: 2021-12-31 | End: 2021-12-31

## 2021-12-31 RX ORDER — METHYLPREDNISOLONE ACETATE 80 MG/ML
INJECTION, SUSPENSION INTRA-ARTICULAR; INTRALESIONAL; INTRAMUSCULAR; SOFT TISSUE
Status: COMPLETED | OUTPATIENT
Start: 2021-12-31 | End: 2021-12-31

## 2021-12-31 RX ADMIN — ROPIVACAINE HYDROCHLORIDE 4 ML: 5 INJECTION, SOLUTION EPIDURAL; INFILTRATION; PERINEURAL at 13:11

## 2021-12-31 RX ADMIN — METHYLPREDNISOLONE ACETATE 80 MG: 80 INJECTION, SUSPENSION INTRA-ARTICULAR; INTRALESIONAL; INTRAMUSCULAR; SOFT TISSUE at 13:12

## 2021-12-31 ASSESSMENT — PAIN SCALES - GENERAL
PAINLEVEL: EXTREME PAIN (8)
PAINLEVEL: NO PAIN (0)

## 2021-12-31 NOTE — Clinical Note
12/31/2021         RE: Susan Patrick  [address] [facility] [address]        Dear Colleague,    Thank you for referring your patient, Susan Patrick, to the [facility] [place]. Please see a copy of my visit note below.    No notes on file    Again, thank you for allowing me to participate in the care of your patient.        Sincerely,        Khushbu Trejo, DO

## 2021-12-31 NOTE — PATIENT INSTRUCTIONS
Please follow up two weeks opst procedure with Joyce to evaluate your plan of care.       DISCHARGE INSTRUCTIONS    During office hours (8:00 a.m.- 4:00 p.m.) questions or concerns may be answered  by calling Spine Center Navigation Nurses at  [phone].  Messages received after hours will be returned the following business day.      In the case of an emergency, please dial 911 or seek assistance at the nearest Emergency Room/Urgent Care facility.     All Patients:    ? You may experience an increase in your symptoms for the first 2 days (It may take anywhere between 2 days- 2 weeks for the steroid to have maximum effect).    ? You may use ice on the injection site, as frequently as 20 minutes each hour if needed.    ? You may take your pain medicine.    ? You may continue taking your regular medication after your injection. If you have had a Medial Branch Block you may resume pain medication once your pain diary is completed.    ? You may shower. No swimming, tub bath or hot tub for 48 hours.  You may remove your bandaid/bandage as soon as you are home.    ? You may resume light activities, as tolerated.    ? Resume your usual diet as tolerated.    ? It is strongly advised that you do not drive for 1-3 hours post injection.    ? If you have had oral sedation:  Do not drive for 8 hours post injection.      ? If you have had IV sedation:  Do not drive for 24 hours post injection.  Do not operate hazardous machinery or make important personal/business decisions for 24 hours.      POSSIBLE STEROID SIDE EFFECTS (If steroid/cortisone was used for your procedure)    -If you experience these symptoms, it should only last for a short period      Swelling of the legs                Skin redness (flushing)       Mouth (oral) irritation     Blood sugar (glucose) levels              Sweats                      Mood changes    Headache    Sleeplessness    Weakened immune system for up to 14 days, which could increase the risk  of justyn the COVID-19 virus and/or experiencing more severe symptoms of the disease, if exposed.    Decreased effectiveness of the flu vaccine if given within 2 weeks of the steroid.         POSSIBLE PROCEDURE SIDE EFFECTS  -Call the Spine Center if you are concerned    Increased Pain             Increased numbness/tingling        Nausea/Vomiting            Bruising/bleeding at site        Redness or swelling                                                Difficulty walking        Weakness             Fever greater than 100.5    *In the event of a severe headache after an epidural steroid injection that is relieved by lying down, please call the [facility] to speak with a clinical staff member*

## 2022-01-10 ENCOUNTER — HOSPITAL ENCOUNTER (OUTPATIENT)
Dept: PHYSICAL THERAPY | Facility: REHABILITATION | Age: 77
End: 2022-01-10
Payer: MEDICARE

## 2022-01-10 DIAGNOSIS — M40.204 KYPHOSIS OF THORACIC REGION, UNSPECIFIED KYPHOSIS TYPE: ICD-10-CM

## 2022-01-10 DIAGNOSIS — S32.009K PSEUDOARTHROSIS OF LUMBAR SPINE: Primary | ICD-10-CM

## 2022-01-10 PROCEDURE — 97110 THERAPEUTIC EXERCISES: CPT | Mod: GP

## 2022-01-14 ENCOUNTER — HOSPITAL ENCOUNTER (OUTPATIENT)
Dept: MRI IMAGING | Facility: HOSPITAL | Age: 77
Discharge: HOME | End: 2022-01-14
Attending: PHYSICIAN ASSISTANT | Admitting: PHYSICIAN ASSISTANT
Payer: MEDICARE

## 2022-01-14 DIAGNOSIS — R29.2 HYPERREFLEXIA: ICD-10-CM

## 2022-01-14 DIAGNOSIS — R26.89 BALANCE PROBLEMS: ICD-10-CM

## 2022-01-14 PROCEDURE — 72141 MRI NECK SPINE W/O DYE: CPT

## 2022-01-17 ENCOUNTER — TELEPHONE (OUTPATIENT)
Dept: PHYSICAL MEDICINE AND REHAB | Facility: CLINIC | Age: 77
End: 2022-01-17

## 2022-01-17 ENCOUNTER — HOSPITAL ENCOUNTER (OUTPATIENT)
Dept: PHYSICAL THERAPY | Facility: REHABILITATION | Age: 77
End: 2022-01-17
Payer: MEDICARE

## 2022-01-17 DIAGNOSIS — M40.204 KYPHOSIS OF THORACIC REGION, UNSPECIFIED KYPHOSIS TYPE: ICD-10-CM

## 2022-01-17 DIAGNOSIS — S32.009K PSEUDOARTHROSIS OF LUMBAR SPINE: Primary | ICD-10-CM

## 2022-01-17 DIAGNOSIS — M48.02 CERVICAL STENOSIS OF SPINAL CANAL: Primary | ICD-10-CM

## 2022-01-17 PROCEDURE — 97110 THERAPEUTIC EXERCISES: CPT | Mod: GP | Performed by: PHYSICAL THERAPIST

## 2022-01-17 NOTE — TELEPHONE ENCOUNTER
----- Message from Joyce Lazaro PA-C sent at 1/17/2022  7:38 AM CST -----  Please call this patient and let her know that I reviewed her MRI cervical spine.  There is a moderate degree of narrowing around her spinal cord due to wear-and-tear of the cartilage discs and joints in her neck.  Given her balance problems and abnormal reflexes, I would like her to see neurosurgery.

## 2022-01-17 NOTE — TELEPHONE ENCOUNTER
Patient calling as she wants to see neurosurgery only now. She wants to cancel with PSP at this time.     New neurosurgery order needed.

## 2022-01-17 NOTE — TELEPHONE ENCOUNTER
Phone call to patient to review results and provider's recommendations. Results given and explained. Did explain PSP would like her to have consultation with neurosurgery. Patient questions who [facility]SP would want her to see in [facility]. States she will talk to PSP about this at her appointment on Thursday.

## 2022-01-18 ENCOUNTER — TELEPHONE (OUTPATIENT)
Dept: NEUROSURGERY | Facility: CLINIC | Age: 77
End: 2022-01-18
Payer: MEDICARE

## 2022-01-18 DIAGNOSIS — M54.12 CERVICAL RADICULOPATHY: Primary | ICD-10-CM

## 2022-01-18 NOTE — TELEPHONE ENCOUNTER
Former patient of Dr. Dequan Davis was referred to Neurosurgery again for cervical spine. Recent cervical MRI in Epic. Please order any other imaging needed prior to seeing Dr. Jo.

## 2022-01-18 NOTE — TELEPHONE ENCOUNTER
1/18/22 TCB. Previous patient of Dr. Prather. Also saw Dr. Gastelum who declined offering surgery. Patient needs consult for cervical.

## 2022-01-26 ENCOUNTER — ANCILLARY PROCEDURE (OUTPATIENT)
Dept: GENERAL RADIOLOGY | Facility: CLINIC | Age: 77
End: 2022-01-26
Payer: MEDICARE

## 2022-01-26 PROCEDURE — 72050 X-RAY EXAM NECK SPINE 4/5VWS: CPT | Mod: TC | Performed by: RADIOLOGY

## 2022-02-01 ENCOUNTER — OFFICE VISIT (OUTPATIENT)
Dept: NEUROSURGERY | Facility: CLINIC | Age: 77
End: 2022-02-01
Payer: MEDICARE

## 2022-02-01 VITALS
DIASTOLIC BLOOD PRESSURE: 81 MMHG | BODY MASS INDEX: 24.84 KG/M2 | HEIGHT: 62 IN | OXYGEN SATURATION: 98 % | HEART RATE: 75 BPM | SYSTOLIC BLOOD PRESSURE: 145 MMHG | WEIGHT: 135 LBS

## 2022-02-01 DIAGNOSIS — M48.02 CERVICAL STENOSIS OF SPINAL CANAL: ICD-10-CM

## 2022-02-01 PROCEDURE — 99215 OFFICE O/P EST HI 40 MIN: CPT | Performed by: PHYSICIAN ASSISTANT

## 2022-02-01 ASSESSMENT — MIFFLIN-ST. JEOR: SCORE: 1045.61

## 2022-02-01 NOTE — PROGRESS NOTES
See Nicole Morales PA-C fully H&P below. In summary, pains in her neck. Headaches from her neck pain. Numbness and tingling in his right arm and in her finger tips only. Finger tips numb for last 6 months or so.   Ambulates with a cane. Maybe the same as before.     We reviewed current MRI. Moderate spinal canal stenosis at C5-7 but does not appear to have much spinal cord compression. Also has severe left and moderate right C5-6 and severe left C6-7 foraminal narrowing. Appears to have a possible component of peripheral neuropathy. Recommend an UE EMG.   She will return to clinic after EMG.    Carrie Escobar MD

## 2022-02-01 NOTE — PROGRESS NOTES
NEUROSURGERY CONSULTATION NOTE      Neurosurgery was asked to see this patient by No att. providers found for evaluation of cervical spinal stenosis.       CONSULTATION ASSESSMENT AND PLAN:      Patient was evaluated and seen in conjunction with Dr. Jo who reviewed the patient's recent images with her and completed examination as well as discussed treatment plan. Patient has been recommended to complete a bilateral upper extremity EMG to rule out possible peripheral neuropathy vs cervical radiculopathy. Follow up has been requested upon completion of EMG for further evaluation. She understands treatment plan and will arrange telephone appointment upon completion.     I spent more than 40 minutes in this apt, examining the pt, reviewing the scans, reviewing notes from chart, discussing treatment options with risks and benefits and coordinating care.     Nicole Morales PA-C      HPI:    Ms. Patrick is a 76 year old right handed female hx osteoporosis (completed Forteo, now on Fosamax) sp revision of a prior L3-5 instrumented fusion with L4-5 pseudoarthrosis with subsequent L4-5, L5-S1 instrumented TLIF on 11/9/2018 by Dr Walsh. She presents today for further evaluation of progressive gait instability and upper extremity weakness over the past 6 months. She has chronic complaint of low back pain and right leg pain and numbness of which she has been doing conservative treatment with injections noting short live relief of pain. She states her primary complaint currently is progressive gait instability and difficulty with hand coordination. She notes right upper extremity pain and numbness that radiates into her upper forearm. She also notes bilateral numbness of her hands and hand weakness. She does not drop items out of her hands but feels that her handwriting continues to worsen and has difficulty with with fine motor coordination such as button shirts. She feels that she has been more dependent on her cane to  ambulate over the past 6 months though she has been using it since 2018. She denies any urinary incontinence. She notes that when her neck pain is severe she will also have generalized headaches but denies nausea emesis or visual changes.     Past Medical History:   Diagnosis Date     Acute cystitis without hematuria      Acute pain of right hip 11/14/2016    Formatting of this note might be different from the original. December 2016, Seen at [facility] by neurosurgery, pain confirmed to be coming from her hip after an injection, referred to orthopedist. 6/2017 note from the spine specialist. Dr. Saskia Gastelum. Now seeing Dr. Ortiz at [facility] for hip pain. may need replacement.     Aftercare following right knee joint replacement surgery 12/18/2012    Formatting of this note might be different from the original. 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  Uses Reglan, once daily 5/14/2013 Barahona's, repeat 3 years 12/2015 Barretts without dysplasia, repeat in 3 years.   Formatting of this note might be different from the original. 2010, patient recalls scope, patient recalls was told to recheck in 5 years.  Uses Reglan, once daily 5/14/2013 Barahona's, repeat 3      Barahona's esophagus      Brain aneurysm 8/1/2006    Overview:  Left  Thrombotic--aneursym clip  Formatting of this note might be different from the  "original. Left  Thrombotic--aneursym clip  Formatting of this note might be different from the original. brain aneurysm 2006 aug, left with right leg weakness     CAD (coronary artery disease) 3/7/2012    Overview:  Noted in old records, normal nuclear stress test 2010   Formatting of this note might be different from the original. Noted in old records, normal nuclear stress test 2010     Cerebral aneurysm     right leg weakness residual     Chronic kidney disease     CKD stage 3     Cigarette nicotine dependence in remission 11/14/2016     Colon polyps     past hx.     Coronary artery disease      CVA (cerebral vascular accident) (H) 12/30/2016     Depression      Depression with anxiety 10/18/2011    Overview:   Formatting of this note might be different from the original.     Fall at home 12/30/2019     Fracture of lumbar vertebra (H) 4/5/2016    Overview:  Overview:  Bilateral L2 pedicle fracture Bilateral L2 pedicle fracture Replacement Utility updated for latest IMO load   Formatting of this note might be different from the original. Overview:  Bilateral L2 pedicle fracture     GERD (gastroesophageal reflux disease)      History of blood clots      History of colonic polyps 2/9/2012    Overview:  Last colonoscopy 9/22/9009 normal. Repeat recommended in 5 years.  5/16/2013 small polyp. Follow up  5 years.  12/15/2015 repeat colonoscopy normal. \"prep not great\".   Formatting of this note might be different from the original. Last colonoscopy 9/22/9009 normal. Repeat recommended in 5 years.  5/16/2013 small polyp. Follow up  5 years.  12/15/2015 repeat colonoscopy normal. \"prep n     History of transfusion      Hyperlipidemia      Hypertension      Hypertension, goal below 140/80 12/30/2016     Hypokalemia      Iron deficiency anemia 2/22/2016    Overview:  12/2015 EGD and colonoscopy did not show significant source of bleeding.  Only gastritis seen on upper endoscopy  Formatting of this note might be different " from the original. 12/2015 EGD and colonoscopy did not show significant source of bleeding.  Only gastritis seen on upper endoscopy 2/3/2020 colonoscopy showed only a hyperplastic polyp  2021- Follows with Dr. Alonzo for iron infusi     Kyphosis of thoracic region 11/14/2016     Lactose intolerance      Low back pain 4/5/2016     Lumbar pseudoarthrosis      Lumbar radiculopathy 11/9/2018     Lumbar vertebral fracture (H)      Major depression, recurrent (H) 12/30/2016     Nontraumatic compression fracture of T7 vertebra (H) 11/14/2016     Osteoporosis      Pain medication agreement 3/13/2015    Overview:  Takes one Norco at bedtime for knee pain Started trial of Tramadol     Parathyroid adenoma      Postoperative anemia      Primary osteoarthritis of right knee 2/10/2021     Pseudoarthrosis of lumbar spine 4/5/2016     Pseudophakia, both eyes 5/19/2017     PVD (peripheral vascular disease) (H)      Right hip pain      S/P lumbar spinal fusion 12/5/2012    Overview:  Dr. Rooney, 7/2012 4/2016 Seen by Dr. Saskia Gastelum neurosurgery at [facility], needs repeat surgery, spontaneous pedicle fractures,  but treatment first for osteoporosis with Foteo.   Formatting of this note might be different from the original. Dr. Rooney, 7/2012 4/2016 Seen by Dr. Saskia Gastelum neurosurgery at [facility], needs repeat surgery, spontaneous pedicle fr     S/P TKR (total knee replacement), right 4/26/2021     Scheuermann's kyphosis 7/19/2016     Senile osteoporosis 5/9/2012    Overview:   history of Barahona's so can not take bisphosphonates. Referred to endocrinology. 5/23/2012 seen by Dr. Stout, recommended IV reclast, at some point, timing will not affect upcoming spine surgery.  Seen by Dr. Denise, treated with Reclast 6/25/2014, 7/9/2015 Referred to Dr. Lala by neurosurgery Dr. Saskia Gastelum at [facility]. due to the need for repeat spinal surgery and persi     Severe sepsis (H) 12/16/2018     Stage 3a  chronic kidney disease (H) 12/30/2016     Status post total hip replacement, right 10/6/2017     Stroke (H)     cerebral infarction-aneurysm     Synovial cyst of left popliteal space 2/10/2021     Ureteral stone 10/31/2018    Overview:  Treated with stent 4/2018 Dr. Dozier   Formatting of this note might be different from the original. Treated with stent 4/2018 Dr. Dozier     Urinary retention with incomplete bladder emptying 11/26/2018     Vitamin D deficiency      Xerostomia     dryness in mouth       Past Surgical History:   Procedure Laterality Date     APPENDECTOMY       ARTHROPLASTY REVISION HIP Right 10/6/2017    Procedure: REMOVAL OF TROCHANTERIC NAIL CONVERSION TO RIGHT TOTAL HIP ARTRHOPLASTY;  Surgeon: Seb Ortiz DO;  Location: [facility];  Service:      BACK SURGERY       CEREBRAL ANEURYSM REPAIR  2006    clipped     CEREBRAL ANEURYSM REPAIR       CERVICAL LAMINECTOMY      C6-7     CHOLECYSTECTOMY       EYE SURGERY Bilateral     cat     FEMORAL BYPASS  1995    left     FRACTURE SURGERY Right     hip pinning     HEMORRHOID SURGERY       HYSTERECTOMY      BSO     LUMBAR FUSION      L3-4, L4-5     OTHER SURGICAL HISTORY      right wrist closed reduction     PARATHYROIDECTOMY N/A 12/30/2016    Procedure: NECK EXPLORATION FOR PARATHYROID ;  Surgeon: Gilberto Snowden MD;  Location: [facility] OR;  Service:        REVIEW OF SYSTEMS:  Negative with exception to HPI     MEDICATIONS:  Current Outpatient Medications   Medication Sig Dispense Refill     acetaminophen (TYLENOL) 500 MG tablet [ACETAMINOPHEN (TYLENOL) 500 MG TABLET] Take 1,000 mg by mouth every 6 (six) hours.       alendronate (FOSAMAX) 70 MG tablet [ALENDRONATE (FOSAMAX) 70 MG TABLET] TAKE 1 TABLET BY MOUTH EVERY 7 DAYS. TAKE IN THE MORNING ON AN EMPTY STOMACH WITH A FULL GLASS OF WATER 30 MINUTES BEFORE FOOD. 12 tablet 3     amLODIPine (NORVASC) 10 MG tablet [AMLODIPINE (NORVASC) 10 MG TABLET] Take 10 mg by mouth daily.        aspirin 81 mg chewable tablet [ASPIRIN 81 MG CHEWABLE TABLET] Chew 81 mg daily.       calcium carbonate-vitamin D3 600 mg(1,500mg) -800 unit per tablet [CALCIUM CARBONATE-VITAMIN D3 600 MG(1,500MG) -800 UNIT PER TABLET] Take 1 tablet by mouth daily.       cholecalciferol, vitamin D3, 1,000 unit tablet [CHOLECALCIFEROL, VITAMIN D3, 1,000 UNIT TABLET] Take 3,000 Units by mouth daily.       clopidogrel (PLAVIX) 75 mg tablet [CLOPIDOGREL (PLAVIX) 75 MG TABLET] Take 75 mg by mouth daily.       cyanocobalamin 1000 MCG tablet [CYANOCOBALAMIN 1000 MCG TABLET] Take 1,000 mcg by mouth daily.       esomeprazole (NEXIUM) 20 MG capsule [ESOMEPRAZOLE (NEXIUM) 20 MG CAPSULE] Take 20 mg by mouth daily before breakfast.       gabapentin (NEURONTIN) 100 MG capsule Take 1 cap at bedtime, increase by 1 cap every three days, max dose 3 caps tid 270 capsule 0     gabapentin (NEURONTIN) 100 MG capsule [GABAPENTIN (NEURONTIN) 100 MG CAPSULE] Take 100 mg by mouth 3 (three) times a day. Gradually taper up per your doctors instructions 90 capsule 3     hydroCHLOROthiazide (HYDRODIURIL) 25 MG tablet [HYDROCHLOROTHIAZIDE (HYDRODIURIL) 25 MG TABLET] Take 25 mg by mouth daily.       HYDROcodone-acetaminophen 5-325 mg per tablet [HYDROCODONE-ACETAMINOPHEN 5-325 MG PER TABLET] Take 1-2 tablets by mouth every 4 (four) hours as needed for pain. 168 tablet 0     lisinopriL (PRINIVIL,ZESTRIL) 40 MG tablet [LISINOPRIL (PRINIVIL,ZESTRIL) 40 MG TABLET] Take 40 mg by mouth daily.       mirtazapine (REMERON) 30 MG tablet [MIRTAZAPINE (REMERON) 30 MG TABLET] Take 30 mg by mouth bedtime.       MULTIVITAMIN ORAL [MULTIVITAMIN ORAL] Take 1 tablet by mouth daily.        pravastatin (PRAVACHOL) 40 MG tablet [PRAVASTATIN (PRAVACHOL) 40 MG TABLET] Take 40 mg by mouth bedtime.       senna (SENOKOT) 8.6 mg tablet Take 2 tablets by mouth 2 times daily        sertraline (ZOLOFT) 50 MG tablet [SERTRALINE (ZOLOFT) 50 MG TABLET] TAKE 3 TABLETS BY MOUTH EVERY MORNING        "    ALLERGIES/SENSITIVITIES:     Allergies   Allergen Reactions     Lactose Other (See Comments)     Diarrhea; Lactose intolerant     Metoprolol Unknown     Bradycardia, heart rate in 30's      Percocet [Oxycodone-Acetaminophen] Other (See Comments)     hallucinations     Augmentin Hives, Swelling and Rash     Caused sores, facial swelling       PERTINENT SOCIAL HISTORY: currently non smoker since   Social History     Socioeconomic History     Marital status: Single     Spouse name: None     Number of children: None     Years of education: None     Highest education level: None   Occupational History     None   Tobacco Use     Smoking status: Former Smoker     Packs/day: 0.50     Years: 15.00     Pack years: 7.50     Quit date: 2006     Years since quittin.0     Smokeless tobacco: Never Used   Substance and Sexual Activity     Alcohol use: Yes     Comment: Alcoholic Drinks/day: rare     Drug use: No     Sexual activity: None   Other Topics Concern     None   Social History Narrative     None     Social Determinants of Health     Financial Resource Strain: Not on file   Food Insecurity: Not on file   Transportation Needs: Not on file   Physical Activity: Not on file   Stress: Not on file   Social Connections: Not on file   Intimate Partner Violence: Not on file   Housing Stability: Not on file         FAMILY HISTORY:  Family History   Problem Relation Age of Onset     Cerebral aneurysm Sister         PHYSICAL EXAM:   Constitutional: BP (!) 145/81   Pulse 75   Ht 5' 2\" (1.575 m)   Wt 135 lb (61.2 kg)   SpO2 98%   BMI 24.69 kg/m       Mental Status: A & O in no acute distress.  Affect is appropriate.  Speech is fluent.  Recent and remote memory are intact.  Attention span and concentration are normal.     Cranial Nerves: CN1: grossly intact per patient recall. CN2: No funduscopic exam performed. CN3,4 & 6: Pupillary light response, lateral and vertical gaze normal.  No nystagmus.  Visual fields are " full to confrontation. CN5: Intact to touch CN7: No facial weakness, smile, facial symmetry intact. CN8: Intact to spoken voice. CN9&10: Gag reflex, uvula midline, palate rises with phonation. CN11: Shoulder shrug 5/5 intact bilaterally. CN12: Tongue midline and moves freely from side to side.     Motor: No pronator drift of upper extremity. Normal bulk and tone all muscle groups of upper and lower extremities.    Strength: weakness of bilateral upper extremities right hand grip4-/5 left hand  4/5, wrist extension right 3+/5 and right 4/5 otherwise good strength throughout upper extremities        Sensory: Sensation intact bilaterally to light touch.     Coordination; finger to nose, heel to shin, rapid alternating movements smooth and rhythmic. Romberg intact. Heel/toe/tandem gait intact.  Normal gait and station.     Reflexes; hyper-reflexia in bilateral upper and lower extremities with clonus of 1-2 beats on right     IMAGING:  I personally reviewed all radiographic images  Mild spinal stenosis noted at C5-C6 and C6-C7 with lesser degree of stenosis at C4-C5   No instability noted with flexion and extension images         Cc:   Lucina De Luna

## 2022-02-01 NOTE — PATIENT INSTRUCTIONS
Patient has been advised to complete EMG of her bilateral upper extremities for further evaluation of her weakness and radicular pain. Follow up has been requested upon completion

## 2022-02-01 NOTE — NURSING NOTE
Pt is here a return for cervical. Pt states that she has pain in her neck and lower back that radiates to her right leg.     Farooq Figueroa MA

## 2022-02-01 NOTE — LETTER
2/1/2022         RE: Susan Patrick  [address] [facility] [address]        Dear Colleague,    Thank you for referring your patient, Susan Patrick, to the [facility] NEUROSURGERY [facility]INIC [facility]. Please see a copy of my visit note below.        NEUROSURGERY CONSULTATION NOTE      Neurosurgery was asked to see this patient by No att. providers found for evaluation of cervical spinal stenosis.       CONSULTATION ASSESSMENT AND PLAN:      Patient was evaluated and seen in conjunction with Dr. Jo who reviewed the patient's recent images with her and completed examination as well as discussed treatment plan. Patient has been recommended to complete a bilateral upper extremity EMG to rule out possible peripheral neuropathy vs cervical radiculopathy. Follow up has been requested upon completion of EMG for further evaluation. She understands treatment plan and will arrange telephone appointment upon completion.     I spent more than 40 minutes in this apt, examining the pt, reviewing the scans, reviewing notes from chart, discussing treatment options with risks and benefits and coordinating care.     Nicole Morales PA-C      HPI:    Ms. Patrick is a 76 year old right handed female hx osteoporosis (completed Forteo, now on Fosamax) sp revision of a prior L3-5 instrumented fusion with L4-5 pseudoarthrosis with subsequent L4-5, L5-S1 instrumented TLIF on 11/9/2018 by Dr Walsh. She presents today for further evaluation of progressive gait instability and upper extremity weakness over the past 6 months. She has chronic complaint of low back pain and right leg pain and numbness of which she has been doing conservative treatment with injections noting short live relief of pain. She states her primary complaint currently is progressive gait instability and difficulty with hand coordination. She notes right upper extremity pain and numbness that radiates into her upper forearm. She also notes bilateral  numbness of her hands and hand weakness. She does not drop items out of her hands but feels that her handwriting continues to worsen and has difficulty with with fine motor coordination such as button shirts. She feels that she has been more dependent on her cane to ambulate over the past 6 months though she has been using it since 2018. She denies any urinary incontinence. She notes that when her neck pain is severe she will also have generalized headaches but denies nausea emesis or visual changes.     Past Medical History:   Diagnosis Date     Acute cystitis without hematuria      Acute pain of right hip 11/14/2016    Formatting of this note might be different from the original. December 2016, Seen at [facility] by neurosurgery, pain confirmed to be coming from her hip after an injection, referred to orthopedist. 6/2017 note from the spine specialist. Dr. Saskia Gastelum. Now seeing Dr. Ortiz at [facility] for hip pain. may need replacement.     Aftercare following right knee joint replacement surgery 12/18/2012    Formatting of this note might be different from the original. 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  Uses Reglan, once daily 5/14/2013 Barahona's, repeat 3 years 12/2015 Barretts without dysplasia, repeat in 3 years.   Formatting of this note might be different from the original. 2010, patient recalls  "scope, patient recalls was told to recheck in 5 years.  Uses Reglan, once daily 5/14/2013 Barahona's, repeat 3      Barahona's esophagus      Brain aneurysm 8/1/2006    Overview:  Left  Thrombotic--aneursym clip  Formatting of this note might be different from the original. Left  Thrombotic--aneursym clip  Formatting of this note might be different from the original. brain aneurysm 2006 aug, left with right leg weakness     CAD (coronary artery disease) 3/7/2012    Overview:  Noted in old records, normal nuclear stress test 2010   Formatting of this note might be different from the original. Noted in old records, normal nuclear stress test 2010     Cerebral aneurysm     right leg weakness residual     Chronic kidney disease     CKD stage 3     Cigarette nicotine dependence in remission 11/14/2016     Colon polyps     past hx.     Coronary artery disease      CVA (cerebral vascular accident) (H) 12/30/2016     Depression      Depression with anxiety 10/18/2011    Overview:   Formatting of this note might be different from the original.     Fall at home 12/30/2019     Fracture of lumbar vertebra (H) 4/5/2016    Overview:  Overview:  Bilateral L2 pedicle fracture Bilateral L2 pedicle fracture Replacement Utility updated for latest IMO load   Formatting of this note might be different from the original. Overview:  Bilateral L2 pedicle fracture     GERD (gastroesophageal reflux disease)      History of blood clots      History of colonic polyps 2/9/2012    Overview:  Last colonoscopy 9/22/9009 normal. Repeat recommended in 5 years.  5/16/2013 small polyp. Follow up  5 years.  12/15/2015 repeat colonoscopy normal. \"prep not great\".   Formatting of this note might be different from the original. Last colonoscopy 9/22/9009 normal. Repeat recommended in 5 years.  5/16/2013 small polyp. Follow up  5 years.  12/15/2015 repeat colonoscopy normal. \"prep n     History of transfusion      Hyperlipidemia      Hypertension      " Hypertension, goal below 140/80 12/30/2016     Hypokalemia      Iron deficiency anemia 2/22/2016    Overview:  12/2015 EGD and colonoscopy did not show significant source of bleeding.  Only gastritis seen on upper endoscopy  Formatting of this note might be different from the original. 12/2015 EGD and colonoscopy did not show significant source of bleeding.  Only gastritis seen on upper endoscopy 2/3/2020 colonoscopy showed only a hyperplastic polyp  2021- Follows with Dr. Alonzo for iron infusi     Kyphosis of thoracic region 11/14/2016     Lactose intolerance      Low back pain 4/5/2016     Lumbar pseudoarthrosis      Lumbar radiculopathy 11/9/2018     Lumbar vertebral fracture (H)      Major depression, recurrent (H) 12/30/2016     Nontraumatic compression fracture of T7 vertebra (H) 11/14/2016     Osteoporosis      Pain medication agreement 3/13/2015    Overview:  Takes one Norco at bedtime for knee pain Started trial of Tramadol     Parathyroid adenoma      Postoperative anemia      Primary osteoarthritis of right knee 2/10/2021     Pseudoarthrosis of lumbar spine 4/5/2016     Pseudophakia, both eyes 5/19/2017     PVD (peripheral vascular disease) (H)      Right hip pain      S/P lumbar spinal fusion 12/5/2012    Overview:  Dr. Rooney, 7/2012 4/2016 Seen by Dr. Saskia Gastelum neurosurgery at [facility], needs repeat surgery, spontaneous pedicle fractures,  but treatment first for osteoporosis with Foteo.   Formatting of this note might be different from the original. Dr. Rooney, 7/2012 4/2016 Seen by Dr. Saskia Gastelum neurosurgery at [facility], needs repeat surgery, spontaneous pedicle fr     S/P TKR (total knee replacement), right 4/26/2021     Scheuermann's kyphosis 7/19/2016     Senile osteoporosis 5/9/2012    Overview:   history of Barahona's so can not take bisphosphonates. Referred to endocrinology. 5/23/2012 seen by Dr. Stout, recommended IV reclast, at some point, timing will not affect  upcoming spine surgery.  Seen by Dr. Denise, treated with Reclast 6/25/2014, 7/9/2015 Referred to Dr. Lala by neurosurgery Dr. Saskia Gastelum at [facility]. due to the need for repeat spinal surgery and persi     Severe sepsis (H) 12/16/2018     Stage 3a chronic kidney disease (H) 12/30/2016     Status post total hip replacement, right 10/6/2017     Stroke (H)     cerebral infarction-aneurysm     Synovial cyst of left popliteal space 2/10/2021     Ureteral stone 10/31/2018    Overview:  Treated with stent 4/2018 Dr. Dozier   Formatting of this note might be different from the original. Treated with stent 4/2018 Dr. Dozier     Urinary retention with incomplete bladder emptying 11/26/2018     Vitamin D deficiency      Xerostomia     dryness in mouth       Past Surgical History:   Procedure Laterality Date     APPENDECTOMY       ARTHROPLASTY REVISION HIP Right 10/6/2017    Procedure: REMOVAL OF TROCHANTERIC NAIL CONVERSION TO RIGHT TOTAL HIP ARTRHOPLASTY;  Surgeon: Seb Ortiz DO;  Location: [facility];  Service:      BACK SURGERY       CEREBRAL ANEURYSM REPAIR  2006    clipped     CEREBRAL ANEURYSM REPAIR       CERVICAL LAMINECTOMY      C6-7     CHOLECYSTECTOMY       EYE SURGERY Bilateral     cat     FEMORAL BYPASS  1995    left     FRACTURE SURGERY Right     hip pinning     HEMORRHOID SURGERY       HYSTERECTOMY      BSO     LUMBAR FUSION      L3-4, L4-5     OTHER SURGICAL HISTORY      right wrist closed reduction     PARATHYROIDECTOMY N/A 12/30/2016    Procedure: NECK EXPLORATION FOR PARATHYROID ;  Surgeon: Gilberto Snowden MD;  Location: [facility] OR;  Service:        REVIEW OF SYSTEMS:  Negative with exception to HPI     MEDICATIONS:  Current Outpatient Medications   Medication Sig Dispense Refill     acetaminophen (TYLENOL) 500 MG tablet [ACETAMINOPHEN (TYLENOL) 500 MG TABLET] Take 1,000 mg by mouth every 6 (six) hours.       alendronate (FOSAMAX) 70 MG tablet [ALENDRONATE (FOSAMAX) 70  MG TABLET] TAKE 1 TABLET BY MOUTH EVERY 7 DAYS. TAKE IN THE MORNING ON AN EMPTY STOMACH WITH A FULL GLASS OF WATER 30 MINUTES BEFORE FOOD. 12 tablet 3     amLODIPine (NORVASC) 10 MG tablet [AMLODIPINE (NORVASC) 10 MG TABLET] Take 10 mg by mouth daily.       aspirin 81 mg chewable tablet [ASPIRIN 81 MG CHEWABLE TABLET] Chew 81 mg daily.       calcium carbonate-vitamin D3 600 mg(1,500mg) -800 unit per tablet [CALCIUM CARBONATE-VITAMIN D3 600 MG(1,500MG) -800 UNIT PER TABLET] Take 1 tablet by mouth daily.       cholecalciferol, vitamin D3, 1,000 unit tablet [CHOLECALCIFEROL, VITAMIN D3, 1,000 UNIT TABLET] Take 3,000 Units by mouth daily.       clopidogrel (PLAVIX) 75 mg tablet [CLOPIDOGREL (PLAVIX) 75 MG TABLET] Take 75 mg by mouth daily.       cyanocobalamin 1000 MCG tablet [CYANOCOBALAMIN 1000 MCG TABLET] Take 1,000 mcg by mouth daily.       esomeprazole (NEXIUM) 20 MG capsule [ESOMEPRAZOLE (NEXIUM) 20 MG CAPSULE] Take 20 mg by mouth daily before breakfast.       gabapentin (NEURONTIN) 100 MG capsule Take 1 cap at bedtime, increase by 1 cap every three days, max dose 3 caps tid 270 capsule 0     gabapentin (NEURONTIN) 100 MG capsule [GABAPENTIN (NEURONTIN) 100 MG CAPSULE] Take 100 mg by mouth 3 (three) times a day. Gradually taper up per your doctors instructions 90 capsule 3     hydroCHLOROthiazide (HYDRODIURIL) 25 MG tablet [HYDROCHLOROTHIAZIDE (HYDRODIURIL) 25 MG TABLET] Take 25 mg by mouth daily.       HYDROcodone-acetaminophen 5-325 mg per tablet [HYDROCODONE-ACETAMINOPHEN 5-325 MG PER TABLET] Take 1-2 tablets by mouth every 4 (four) hours as needed for pain. 168 tablet 0     lisinopriL (PRINIVIL,ZESTRIL) 40 MG tablet [LISINOPRIL (PRINIVIL,ZESTRIL) 40 MG TABLET] Take 40 mg by mouth daily.       mirtazapine (REMERON) 30 MG tablet [MIRTAZAPINE (REMERON) 30 MG TABLET] Take 30 mg by mouth bedtime.       MULTIVITAMIN ORAL [MULTIVITAMIN ORAL] Take 1 tablet by mouth daily.        pravastatin (PRAVACHOL) 40 MG tablet  "[PRAVASTATIN (PRAVACHOL) 40 MG TABLET] Take 40 mg by mouth bedtime.       senna (SENOKOT) 8.6 mg tablet Take 2 tablets by mouth 2 times daily        sertraline (ZOLOFT) 50 MG tablet [SERTRALINE (ZOLOFT) 50 MG TABLET] TAKE 3 TABLETS BY MOUTH EVERY MORNING           ALLERGIES/SENSITIVITIES:     Allergies   Allergen Reactions     Lactose Other (See Comments)     Diarrhea; Lactose intolerant     Metoprolol Unknown     Bradycardia, heart rate in 30's      Percocet [Oxycodone-Acetaminophen] Other (See Comments)     hallucinations     Augmentin Hives, Swelling and Rash     Caused sores, facial swelling       PERTINENT SOCIAL HISTORY: currently non smoker since 2016  Social History     Socioeconomic History     Marital status: Single     Spouse name: None     Number of children: None     Years of education: None     Highest education level: None   Occupational History     None   Tobacco Use     Smoking status: Former Smoker     Packs/day: 0.50     Years: 15.00     Pack years: 7.50     Quit date: 2006     Years since quittin.0     Smokeless tobacco: Never Used   Substance and Sexual Activity     Alcohol use: Yes     Comment: Alcoholic Drinks/day: rare     Drug use: No     Sexual activity: None   Other Topics Concern     None   Social History Narrative     None     Social Determinants of Health     Financial Resource Strain: Not on file   Food Insecurity: Not on file   Transportation Needs: Not on file   Physical Activity: Not on file   Stress: Not on file   Social Connections: Not on file   Intimate Partner Violence: Not on file   Housing Stability: Not on file         FAMILY HISTORY:  Family History   Problem Relation Age of Onset     Cerebral aneurysm Sister         PHYSICAL EXAM:   Constitutional: BP (!) 145/81   Pulse 75   Ht 5' 2\" (1.575 m)   Wt 135 lb (61.2 kg)   SpO2 98%   BMI 24.69 kg/m       Mental Status: A & O in no acute distress.  Affect is appropriate.  Speech is fluent.  Recent and remote memory " are intact.  Attention span and concentration are normal.     Cranial Nerves: CN1: grossly intact per patient recall. CN2: No funduscopic exam performed. CN3,4 & 6: Pupillary light response, lateral and vertical gaze normal.  No nystagmus.  Visual fields are full to confrontation. CN5: Intact to touch CN7: No facial weakness, smile, facial symmetry intact. CN8: Intact to spoken voice. CN9&10: Gag reflex, uvula midline, palate rises with phonation. CN11: Shoulder shrug 5/5 intact bilaterally. CN12: Tongue midline and moves freely from side to side.     Motor: No pronator drift of upper extremity. Normal bulk and tone all muscle groups of upper and lower extremities.    Strength: weakness of bilateral upper extremities right hand grip4-/5 left hand  4/5, wrist extension right 3+/5 and right 4/5 otherwise good strength throughout upper extremities        Sensory: Sensation intact bilaterally to light touch.     Coordination; finger to nose, heel to shin, rapid alternating movements smooth and rhythmic. Romberg intact. Heel/toe/tandem gait intact.  Normal gait and station.     Reflexes; hyper-reflexia in bilateral upper and lower extremities with clonus of 1-2 beats on right     IMAGING:  I personally reviewed all radiographic images  Mild spinal stenosis noted at C5-C6 and C6-C7 with lesser degree of stenosis at C4-C5   No instability noted with flexion and extension images         Cc:   Lucina De Luna              See Nicole Morales PA-C fully H&P below. In summary, pains in her neck. Headaches from her neck pain. Numbness and tingling in his right arm and in her finger tips only. Finger tips numb for last 6 months or so.   Ambulates with a cane. Maybe the same as before.     We reviewed current MRI. Moderate spinal canal stenosis at C5-7 but does not appear to have much spinal cord compression. Also has severe left and moderate right C5-6 and severe left C6-7 foraminal narrowing. Appears to have a possible  component of peripheral neuropathy. Recommend an UE EMG.   She will return to clinic after EMG.    Carrie Escobar MD         Again, thank you for allowing me to participate in the care of your patient.        Sincerely,        Carrie Jo MD

## 2022-02-08 ENCOUNTER — OFFICE VISIT (OUTPATIENT)
Dept: PHYSICAL MEDICINE AND REHAB | Facility: CLINIC | Age: 77
End: 2022-02-08
Attending: PHYSICIAN ASSISTANT
Payer: MEDICARE

## 2022-02-08 DIAGNOSIS — M48.02 CERVICAL STENOSIS OF SPINAL CANAL: ICD-10-CM

## 2022-02-08 DIAGNOSIS — R20.2 PARESTHESIA OF BOTH HANDS: Primary | ICD-10-CM

## 2022-02-08 PROCEDURE — 95886 MUSC TEST DONE W/N TEST COMP: CPT | Mod: RT | Performed by: PHYSICAL MEDICINE & REHABILITATION

## 2022-02-08 PROCEDURE — 95911 NRV CNDJ TEST 9-10 STUDIES: CPT | Performed by: PHYSICAL MEDICINE & REHABILITATION

## 2022-02-08 NOTE — PATIENT INSTRUCTIONS
Thank you for choosing the [facility] for your EMG testing.    The ordering provider will receive your final EMG results within the next few days.  Please follow up with your provider for the results and further treatment recommendations.

## 2022-02-08 NOTE — PROGRESS NOTES
Patient presents at the request of Nicole Morales PA-C for a bilateral upper extremity EMG.  She has thoracic and lumbar pain with thoracic kyphosis and bilateral hand numbness and tingling all digits involved.  She is right-handed.  Right worse than left in severity.  On exam she has normal sensation to light touch throughout the upper extremities, normal strength throughout the major muscle groups of the upper extremities, and negative Wilmar's.    EMG/NCS  results: Please see scanned full report    Comment NCS: Normal study  1.  Essentially normal nerve conduction studies of the bilateral upper extremities.  Normal right median and ulnar SNAPs, bilateral median and ulnar transcarpal studies, bilateral ulnar CMAPs, right ulnar CMAP, and left ulnar CMAP distal latency with borderline/mildly low amplitude.  This is likely technical/normal for the patient.      Comment EMG: Normal study  1.  Normal needle EMG bilateral upper extremities.    Interpretation: Normal study:    1. There is no electrodiagnostic evidence of cervical radiculopathy, brachial plexopathy, or focal neuropathy in the Bilateral upper extremities.  Specifically, there is no electrodiagnostic evidence of median neuropathy at the wrist or C8/T1 radiculopathy in the bilateral upper extremities.  .    The testing was completed in its entirety by the physician.       It was our pleasure caring for your patient today, if there any questions or concerns please do not hesitate to contact us.

## 2022-02-08 NOTE — LETTER
2/8/2022         RE: Susan Patrick  [address] [address]        Dear Colleague,    Thank you for referring your patient, Susan Patrick, to the [facility] SPINE [facility] [place]. Please see a copy of my visit note below.    Patient presents at the request of Nicole Morales PA-C for a bilateral upper extremity EMG.  She has thoracic and lumbar pain with thoracic kyphosis and bilateral hand numbness and tingling all digits involved.  She is right-handed.  Right worse than left in severity.  On exam she has normal sensation to light touch throughout the upper extremities, normal strength throughout the major muscle groups of the upper extremities, and negative Wilmar's.    EMG/NCS  results: Please see scanned full report    Comment NCS: Normal study  1.  Essentially normal nerve conduction studies of the bilateral upper extremities.  Normal right median and ulnar SNAPs, bilateral median and ulnar transcarpal studies, bilateral ulnar CMAPs, right ulnar CMAP, and left ulnar CMAP distal latency with borderline/mildly low amplitude.  This is likely technical/normal for the patient.      Comment EMG: Normal study  1.  Normal needle EMG bilateral upper extremities.    Interpretation: Normal study:    1. There is no electrodiagnostic evidence of cervical radiculopathy, brachial plexopathy, or focal neuropathy in the Bilateral upper extremities.  Specifically, there is no electrodiagnostic evidence of median neuropathy at the wrist or C8/T1 radiculopathy in the bilateral upper extremities.  .    The testing was completed in its entirety by the physician.       It was our pleasure caring for your patient today, if there any questions or concerns please do not hesitate to contact us.        Again, thank you for allowing me to participate in the care of your patient.        Sincerely,        Usman Oakes, DO

## 2022-02-22 ENCOUNTER — OFFICE VISIT (OUTPATIENT)
Dept: NEUROSURGERY | Facility: CLINIC | Age: 77
End: 2022-02-22
Payer: MEDICARE

## 2022-02-22 VITALS
HEIGHT: 62 IN | DIASTOLIC BLOOD PRESSURE: 81 MMHG | SYSTOLIC BLOOD PRESSURE: 140 MMHG | HEART RATE: 61 BPM | BODY MASS INDEX: 24.84 KG/M2 | OXYGEN SATURATION: 98 % | WEIGHT: 135 LBS

## 2022-02-22 DIAGNOSIS — M81.0 SENILE OSTEOPOROSIS: ICD-10-CM

## 2022-02-22 DIAGNOSIS — M54.6 CHRONIC BILATERAL THORACIC BACK PAIN: ICD-10-CM

## 2022-02-22 DIAGNOSIS — E55.9 VITAMIN D DEFICIENCY: Primary | ICD-10-CM

## 2022-02-22 DIAGNOSIS — M40.14 OTHER SECONDARY KYPHOSIS, THORACIC REGION: Primary | ICD-10-CM

## 2022-02-22 DIAGNOSIS — G89.29 CHRONIC BILATERAL THORACIC BACK PAIN: ICD-10-CM

## 2022-02-22 PROCEDURE — 99214 OFFICE O/P EST MOD 30 MIN: CPT | Performed by: SURGERY

## 2022-02-22 RX ORDER — HYDROCODONE BITARTRATE AND ACETAMINOPHEN 5; 325 MG/1; MG/1
1 TABLET ORAL EVERY 6 HOURS PRN
Qty: 18 TABLET | Refills: 0 | Status: SHIPPED | OUTPATIENT
Start: 2022-02-22 | End: 2022-02-22

## 2022-02-22 RX ORDER — HYDROCODONE BITARTRATE AND ACETAMINOPHEN 5; 325 MG/1; MG/1
1 TABLET ORAL EVERY 6 HOURS PRN
Qty: 18 TABLET | Refills: 0 | Status: SHIPPED | OUTPATIENT
Start: 2022-02-22

## 2022-02-22 NOTE — PROGRESS NOTES
NEUROSURGERY FOLLOWUP  NOTE  Susan Ptarick comes today in f/u.Patient has a history of a prior L3-L5 instrumented fusion by Dr. Dumont with pseudoarthrosis and then subsequent L4-5, L5-S1 instrumented TLIF November 9, 2018 with Dr. Walsh.      Recently presented with headaches, neck pain and right arm numbness and tingling.  She was recommended EMG and returns in follow-up.  Since her last visit her right arm symptoms have resolved. EMG of her right UE was negative. She is currently complaining of pain mostly localized to her thoracic region.  She denies any significant extremity symptoms at this time.  We again discussed her recommendations from Dr. Gastelum, deformity surgeon, and at this time her thoracic kyphotic deformity is likely causing her sagittal malalignment and standing intolerance which is causing her pain.  She was not found to be a surgical candidate given her medical co-morbidities and severe risk for complications from a large corrective surgery.  She is very tearful and painful during today's visit.  Discussed a consultation with a pain specialist.  Norco was prescribed. We dicussed this would not be prescribed further from our office and she will need to establish with  the pain team for long term medications.     I spent more than 10 minutes in this apt, examining the pt, reviewing the scans, reviewing notes from chart, discussing treatment options with risks and benefits and coordinating care.     Carrie Jo MD      CC:     Noah De Luna[address]  [place] [address]

## 2022-02-22 NOTE — LETTER
2/22/2022         RE: Susan Patrick  [address] [address]        Dear Colleague,    Thank you for referring your patient, Susan Patrick, to the [facility]UROSURGERY [facility]INIC [facility]. Please see a copy of my visit note below.    NEUROSURGERY FOLLOWUP  NOTE  Susan Patrick comes today in f/u.Patient has a history of a prior L3-L5 instrumented fusion by Dr. Dumont with pseudoarthrosis and then subsequent L4-5, L5-S1 instrumented TLIF November 9, 2018 with Dr. Walsh.      Recently presented with headaches, neck pain and right arm numbness and tingling.  She was recommended EMG and returns in follow-up.  Since her last visit her right arm symptoms have resolved. EMG of her right UE was negative. She is currently complaining of pain mostly localized to her thoracic region.  She denies any significant extremity symptoms at this time.  We again discussed her recommendations from Dr. Gastelum, deformity surgeon, and at this time her thoracic kyphotic deformity is likely causing her sagittal malalignment and standing intolerance which is causing her pain.  She was not found to be a surgical candidate given her medical co-morbidities and severe risk for complications from a large corrective surgery.  She is very tearful and painful during today's visit.  Discussed a consultation with a pain specialist.  Norco was prescribed. We dicussed this would not be prescribed further from our office and she will need to establish with  the pain team for long term medications.     I spent more than 10 minutes in this apt, examining the pt, reviewing the scans, reviewing notes from chart, discussing treatment options with risks and benefits and coordinating care.     Carrie Jo MD      CC:     Lucina De Luna  [facility] [address]  [place] [address]      Right ar symptms improvd.  occasionlaly distal right arm discomfort   Really painful in mid thraic  pain. Not a deformity candidate  Recommend consult with pain specialist.   norco 18 pills prescribe      Again, thank you for allowing me to participate in the care of your patient.        Sincerely,        Carrie Jo MD

## 2022-02-22 NOTE — NURSING NOTE
Pt is here after EMG. Lower back and mid back and radiates to the back of hr right thigh. Pt denies any other symptoms.   DOMINGA:  Farooq Figueroa MA

## 2022-02-23 ENCOUNTER — LAB (OUTPATIENT)
Dept: LAB | Facility: CLINIC | Age: 77
End: 2022-02-23
Payer: MEDICARE

## 2022-02-23 DIAGNOSIS — M81.0 SENILE OSTEOPOROSIS: ICD-10-CM

## 2022-02-23 DIAGNOSIS — E55.9 VITAMIN D DEFICIENCY: ICD-10-CM

## 2022-02-23 LAB — CALCIUM SERPL-MCNC: 9.8 MG/DL (ref 8.5–10.5)

## 2022-02-23 PROCEDURE — 82310 ASSAY OF CALCIUM: CPT

## 2022-02-23 PROCEDURE — 36415 COLL VENOUS BLD VENIPUNCTURE: CPT

## 2022-02-23 PROCEDURE — 82306 VITAMIN D 25 HYDROXY: CPT

## 2022-02-24 ENCOUNTER — TELEPHONE (OUTPATIENT)
Dept: NEUROSURGERY | Facility: CLINIC | Age: 77
End: 2022-02-24
Payer: MEDICARE

## 2022-02-24 DIAGNOSIS — M48.02 CERVICAL STENOSIS OF SPINAL CANAL: Primary | ICD-10-CM

## 2022-02-24 LAB — DEPRECATED CALCIDIOL+CALCIFEROL SERPL-MC: 27 UG/L (ref 30–80)

## 2022-02-24 NOTE — TELEPHONE ENCOUNTER
Patient would like a return call to discuss her medications. She is feeling a little confused.    Best number to reach her: [phone]

## 2022-02-24 NOTE — TELEPHONE ENCOUNTER
Called Susan who inquired about naloxene that was sent to the wrong pharmacy. Resent to [facility] in [place].  Tania York RN, CNRN

## 2022-03-11 NOTE — PROGRESS NOTES
"[facility]    Outpatient Physical Therapy Discharge Note  Patient: Susan Patrick  : 1945    Beginning/End Dates of Reporting Period:  21 to 22    Referring Provider: CHANDLER Wellington CNP     Therapy Diagnosis:Low Back and thoracic pain, generalized weakness, decreased trunk and hip ROM, postural deficits, balance impairment       Client Self Report: Pt arrives 8 min late due to [employer] [facility]. Pt is going to schedule with the neurosurgeon per spine center recommendation. Pt had a \"bad weekend\" and did not sleep well one night.     Objective Measurements:  Objective Measure: Pain  Details: 7/10    Goals:  Goal Identifier HEP   Goal Description Patient will demonstrate independence with home exercises to facilitate improvement in function.    Target Date 21   Date Met  22   Progress (detail required for progress note): MET- continue to progress as able     Goal Identifier Standing   Goal Description Patient will be able to tolerate standing for at least 20 minutes with no discomfort to improve ability in completing upright ADLs such as cooking and cleaning.    Target Date 22   Date Met      Progress (detail required for progress note): no change     Goal Identifier Strength    Goal Description Patient will improve LE and hip strength to at least 4+/5 to improve functional mobility and strength.    Target Date 22   Date Met      Progress (detail required for progress note):       Goal Identifier     Goal Description     Target Date     Date Met      Progress (detail required for progress note):       Goal Identifier     Goal Description     Target Date     Date Met      Progress (detail required for progress note):       Goal Identifier     Goal Description     Target Date     Date Met      Progress (detail required for progress note):       Goal Identifier "     Goal Description     Target Date     Date Met      Progress (detail required for progress note):       Goal Identifier     Goal Description     Target Date     Date Met      Progress (detail required for progress note):             Plan:  Discharge from therapy.    Discharge:    Reason for Discharge: Patient chooses to discontinue therapy.    Equipment Issued: none    Discharge Plan: Patient to continue home program.    Iram Mcknight, PT, DPT, CLT-ASHLEY

## 2022-03-11 NOTE — ADDENDUM NOTE
Encounter addended by: Iram Mcknight, PT on: 3/11/2022 10:36 AM   Actions taken: Episode resolved, Clinical Note Signed

## 2022-05-10 ENCOUNTER — OFFICE VISIT (OUTPATIENT)
Dept: ENDOCRINOLOGY | Facility: CLINIC | Age: 77
End: 2022-05-10
Payer: MEDICARE

## 2022-05-10 VITALS
SYSTOLIC BLOOD PRESSURE: 136 MMHG | HEART RATE: 54 BPM | BODY MASS INDEX: 24.99 KG/M2 | HEIGHT: 62 IN | WEIGHT: 135.8 LBS | DIASTOLIC BLOOD PRESSURE: 74 MMHG

## 2022-05-10 DIAGNOSIS — M81.0 SENILE OSTEOPOROSIS: ICD-10-CM

## 2022-05-10 PROBLEM — K20.90 ESOPHAGITIS: Status: ACTIVE | Noted: 2022-05-10

## 2022-05-10 PROBLEM — K63.5 POLYP OF COLON: Status: ACTIVE | Noted: 2020-02-03

## 2022-05-10 PROBLEM — E78.5 HYPERLIPIDEMIA: Status: ACTIVE | Noted: 2022-03-15

## 2022-05-10 PROCEDURE — 99214 OFFICE O/P EST MOD 30 MIN: CPT | Performed by: NURSE PRACTITIONER

## 2022-05-10 RX ORDER — ALENDRONATE SODIUM 70 MG/1
TABLET ORAL
Qty: 12 TABLET | Refills: 3 | Status: SHIPPED | OUTPATIENT
Start: 2022-05-10 | End: 2022-11-10

## 2022-05-10 NOTE — LETTER
5/10/2022         RE: Susan Patrick  [address] [facility] [address]        Dear Colleague,    Thank you for referring your patient, Susan Patrick, to the [facility]. Please see a copy of my visit note below.    [facility]  ENDOCRINOLOGY    Osteoporosis Follow Up 5/10/2022    Susan Patrick, 1945, [id number]          Reason for visit      1. Senile osteoporosis        History     Susan Patrick is a very pleasant 76 year old old female who presents for follow up.   SUMMARY:  Diagnosed with osteoporosis  In .   DXA scan is noted below indicating a lowest T-score of -2.7 at the left femoral neck. Started on reclast after this DXA scan- no prior DXA scan at this facility is 1 from [employer] noted below..   Current and prior treatments include Reclast x2 doses  Risk factors include age, gender, , early menopause, former smoker  The following high- risk conditions have been ruled out: celiac disease, eating disorders, gastric bypass, hyperparathyroidism, inflammatory bowel disease, hyperthyroidism, rheumatoid arthritis, lupus, chronic kidney disease.  She is not on high risk medications such as glucocorticoids, anti-coagulants, anti-convulsants, chemotherapy or levothyroxine.   Personal or family history of kidney stones is negative.  Family history of osteoporosis is Unknown- mother  young-47     Calcium intake 650mg calcium supplements;16oz of milk daily as well. Occasional cheese and yogurt- 4x/week  Vitamin D intake 4000 IU D3 ( 2x 2000IU daily)  Weight bearing exercise Unable  Denied tobacco use- former smoker quit in .   Denies alcohol use  There is a stress fracture across the L2 pedicles and she is here to determine next steps in treatment.  Back surgery was in .  In 2014 she saw Dr. Denise at [facility] and was advised to start on treatment but did not do so immediately.  Prior to that she saw another physician Dr. Stout  "who recommended Fosamax in 2013 as osteoporosis had already been diagnosed.  She therefore went untreated for at least 2 years.  She does not give a particular reason for not taking medications.  They lumbar fracture appears to have been atraumatic.  She's not aware of any family history of osteoporosis.  She has received at least one cortisone injection through Hospital in her back.  She also has right knee arthritis which has been giving her limitations in mobility.  Prior fractures in 2013 include her falling off a step and fracturing her wrist in her right hip    TODAY:    Susan is here today in follow-up for Osteoporosis. She is reporting a \"whole lot of medical stuff\" over the last 6 months, including dx of 2 murmurs which took her to Cardiology. She is seeing the Pain Center for her chronic back pain, and is scheduled for an MRI next week.  She has had no falls in the last year.  She continues with Alendronate without difficulties. Pt has been on it since 2018.  She has also very overdue for another Dexa Scan. She was to have one done in  and never called to schedule it. She was reminded of this last year, and shortly thereafter, her   very suddenly and this threw her for a loop, so to speak.  Her Vit D remains low, in spite of directing to double it last year.       Risk Factors     The following high- risk conditions have been ruled out: celiac disease, eating disorders, gastric bypass, hyperparathyroidism, inflammatory bowel disease, hyperthyroidism, rheumatoid arthritis, lupus, chronic kidney disease.     Susan Patrick has the following risk factors: Age, Female gender,  and Low BMI, hx of smoking     She is not on high risk medications such as glucocorticoids, anti-coagulants, anti-convulsants, chemotherapy or levothyroxine.    Patient deniesBreast cancer and Family history of breast cancer.      Menopause was at age: 33 years.  Hysterectomy at age 30 but no ovariectomy.No " HRT.  Height loss of 4 inches    Past Medical History     Patient Active Problem List   Diagnosis     Pseudoarthrosis of lumbar spine     Low back pain     Fracture of lumbar vertebra (H)     Vitamin D deficiency     Scheuermann's kyphosis     Nontraumatic compression fracture of T7 vertebra (H)     Cigarette nicotine dependence in remission     Acute pain of right hip     Kyphosis of thoracic region     Parathyroid adenoma     Hypertension, goal below 140/80     PVD (peripheral vascular disease) (H)     CVA (cerebral vascular accident) (H)     Barahona esophagus     Major depression, recurrent (H)     Stage 3a chronic kidney disease (H)     Arthropathy of right hip     Status post total hip replacement, right     Brain aneurysm     Iron deficiency anemia     Lactose intolerance     Pain medication agreement     History of colonic polyps     S/P lumbar spinal fusion     Lumbar radiculopathy     Lumbar pseudoarthrosis     Postoperative anemia     Urinary retention with incomplete bladder emptying     Severe sepsis (H)     Acute cystitis without hematuria     Hypokalemia     LADARIUS (acute kidney injury) (H)     Bacteremia due to Escherichia coli     CAD (coronary artery disease)     Pseudophakia, both eyes     Senile osteoporosis     Ureteral stone     Xerostomia     Aftercare following right knee joint replacement surgery     Fall at home     Primary osteoarthritis of right knee     Synovial cyst of left popliteal space     S/P TKR (total knee replacement), right     Cerebral infarction (H)     Diverticular disease of colon     Hyperlipidemia     Polyp of colon     Disorder of function of stomach     Esophagitis     Gastritis       Family History       family history includes Cerebral aneurysm in her sister.    Social History      reports that she quit smoking about 16 years ago. She has a 7.50 pack-year smoking history. She has never used smokeless tobacco. She reports current alcohol use. She reports that she does not  "use drugs.      Review of Systems     Patient denies current pain, limited mobility, fractures.   Remainder per HPI.      Vital Signs     /74 (BP Location: Right arm, Patient Position: Sitting, Cuff Size: Adult Regular)   Pulse 54   Ht 1.575 m (5' 2.01\")   Wt 61.6 kg (135 lb 12.8 oz)   BMI 24.83 kg/m      Physical Exam     Constitutional:  Well developed, Well nourished  HENT:  Normocephalic,   Neck: normal in appearance  Eyes:  PERRL, Conjunctiva pink  Respiratory:  No respiratory distress  Skin: No acanthosis nigricans, lipoatrophy or lipodystrophy  Neurologic:  Alert & oriented x 3, nonfocal  Psychiatric:  Affect, Mood, Insight appropriate        Assessment     1. Senile osteoporosis        Plan     Susan will remain on the Alendronate for one more year. She will get another Dexa Scan done so we can see what is going on. Encouraged to take her Vit D supplement. She will f/u with me in 1 year.         Lena Friedman NP  [facility]  5/10/2022  12:04 PM      Current Medications     Outpatient Medications Prior to Visit   Medication Sig Dispense Refill     acetaminophen (TYLENOL) 500 MG tablet [ACETAMINOPHEN (TYLENOL) 500 MG TABLET] Take 1,000 mg by mouth every 6 (six) hours.       amLODIPine (NORVASC) 10 MG tablet [AMLODIPINE (NORVASC) 10 MG TABLET] Take 10 mg by mouth daily.       aspirin 81 mg chewable tablet [ASPIRIN 81 MG CHEWABLE TABLET] Chew 81 mg daily.       calcium carbonate-vitamin D3 600 mg(1,500mg) -800 unit per tablet [CALCIUM CARBONATE-VITAMIN D3 600 MG(1,500MG) -800 UNIT PER TABLET] Take 1 tablet by mouth daily.       cholecalciferol, vitamin D3, 1,000 unit tablet [CHOLECALCIFEROL, VITAMIN D3, 1,000 UNIT TABLET] Take 3,000 Units by mouth daily.       clopidogrel (PLAVIX) 75 mg tablet [CLOPIDOGREL (PLAVIX) 75 MG TABLET] Take 75 mg by mouth daily.       cyanocobalamin 1000 MCG tablet [CYANOCOBALAMIN 1000 MCG TABLET] Take 1,000 mcg by mouth daily.       esomeprazole (NEXIUM) 20 MG " capsule [ESOMEPRAZOLE (NEXIUM) 20 MG CAPSULE] Take 20 mg by mouth daily before breakfast.       gabapentin (NEURONTIN) 100 MG capsule Take 1 cap at bedtime, increase by 1 cap every three days, max dose 3 caps tid (Patient taking differently: 300 mg 3 times daily Take 1 cap at bedtime, increase by 1 cap every three days, max dose 3 caps tid) 270 capsule 0     hydroCHLOROthiazide (HYDRODIURIL) 25 MG tablet [HYDROCHLOROTHIAZIDE (HYDRODIURIL) 25 MG TABLET] Take 25 mg by mouth daily.       lisinopriL (PRINIVIL,ZESTRIL) 40 MG tablet [LISINOPRIL (PRINIVIL,ZESTRIL) 40 MG TABLET] Take 40 mg by mouth daily.       mirtazapine (REMERON) 30 MG tablet [MIRTAZAPINE (REMERON) 30 MG TABLET] Take 30 mg by mouth bedtime.       MULTIVITAMIN ORAL [MULTIVITAMIN ORAL] Take 1 tablet by mouth daily.        pravastatin (PRAVACHOL) 40 MG tablet [PRAVASTATIN (PRAVACHOL) 40 MG TABLET] Take 40 mg by mouth bedtime.       sertraline (ZOLOFT) 50 MG tablet [SERTRALINE (ZOLOFT) 50 MG TABLET] TAKE 3 TABLETS BY MOUTH EVERY MORNING       HYDROcodone-acetaminophen (NORCO) 5-325 MG tablet Take 1 tablet by mouth every 6 hours as needed for pain (Patient not taking: Reported on 5/10/2022) 18 tablet 0     naloxone (NARCAN) 4 MG/0.1ML nasal spray Spray 1 spray (4 mg) into one nostril alternating nostrils once as needed for opioid reversal every 2-3 minutes until assistance arrives (Patient not taking: Reported on 5/10/2022) 0.2 mL 1     senna (SENOKOT) 8.6 mg tablet Take 2 tablets by mouth 2 times daily  (Patient not taking: Reported on 5/10/2022)       alendronate (FOSAMAX) 70 MG tablet [ALENDRONATE (FOSAMAX) 70 MG TABLET] TAKE 1 TABLET BY MOUTH EVERY 7 DAYS. TAKE IN THE MORNING ON AN EMPTY STOMACH WITH A FULL GLASS OF WATER 30 MINUTES BEFORE FOOD. 12 tablet 3     gabapentin (NEURONTIN) 100 MG capsule [GABAPENTIN (NEURONTIN) 100 MG CAPSULE] Take 100 mg by mouth 3 (three) times a day. Gradually taper up per your doctors instructions 90 capsule 3     naloxone  (NARCAN) 4 MG/0.1ML nasal spray Spray 1 spray (4 mg) into one nostril alternating nostrils once as needed for opioid reversal every 2-3 minutes until assistance arrives 0.2 mL 0     No facility-administered medications prior to visit.         Lab Results     No results found for: TSH, PHOS, IRON, VITD25        Imaging Results   Last DEXA scan:  No valid procedures specified.        Again, thank you for allowing me to participate in the care of your patient.        Sincerely,        Lena Friedman NP

## 2022-05-10 NOTE — PROGRESS NOTES
[facility]  ENDOCRINOLOGY    Osteoporosis Follow Up 5/10/2022    Susan Patrick, 1945, [id number]          Reason for visit      1. Senile osteoporosis        History     Susan Patrick is a very pleasant 76 year old old female who presents for follow up.   SUMMARY:  Diagnosed with osteoporosis  In .   DXA scan is noted below indicating a lowest T-score of -2.7 at the left femoral neck. Started on reclast after this DXA scan- no prior DXA scan at this facility is 1 from [employer] noted below..   Current and prior treatments include Reclast x2 doses  Risk factors include age, gender, , early menopause, former smoker  The following high- risk conditions have been ruled out: celiac disease, eating disorders, gastric bypass, hyperparathyroidism, inflammatory bowel disease, hyperthyroidism, rheumatoid arthritis, lupus, chronic kidney disease.  She is not on high risk medications such as glucocorticoids, anti-coagulants, anti-convulsants, chemotherapy or levothyroxine.   Personal or family history of kidney stones is negative.  Family history of osteoporosis is Unknown- mother  young-47     Calcium intake 650mg calcium supplements;16oz of milk daily as well. Occasional cheese and yogurt- 4x/week  Vitamin D intake 4000 IU D3 ( 2x 2000IU daily)  Weight bearing exercise Unable  Denied tobacco use- former smoker quit in .   Denies alcohol use  There is a stress fracture across the L2 pedicles and she is here to determine next steps in treatment.  Back surgery was in .  In 2014 she saw Dr. Denies at [facility] and was advised to start on treatment but did not do so immediately.  Prior to that she saw another physician Dr. Stout who recommended Fosamax in  as osteoporosis had already been diagnosed.  She therefore went untreated for at least 2 years.  She does not give a particular reason for not taking medications.  They lumbar fracture appears to have been atraumatic.  She's  "not aware of any family history of osteoporosis.  She has received at least one cortisone injection through Hospital in her back.  She also has right knee arthritis which has been giving her limitations in mobility.  Prior fractures in 2013 include her falling off a step and fracturing her wrist in her right hip    TODAY:    Susan is here today in follow-up for Osteoporosis. She is reporting a \"whole lot of medical stuff\" over the last 6 months, including dx of 2 murmurs which took her to Cardiology. She is seeing the Pain Center for her chronic back pain, and is scheduled for an MRI next week.  She has had no falls in the last year.  She continues with Alendronate without difficulties. Pt has been on it since 2018.  She has also very overdue for another Dexa Scan. She was to have one done in  and never called to schedule it. She was reminded of this last year, and shortly thereafter, her   very suddenly and this threw her for a loop, so to speak.  Her Vit D remains low, in spite of directing to double it last year.       Risk Factors     The following high- risk conditions have been ruled out: celiac disease, eating disorders, gastric bypass, hyperparathyroidism, inflammatory bowel disease, hyperthyroidism, rheumatoid arthritis, lupus, chronic kidney disease.     Susan Patrick has the following risk factors: Age, Female gender,  and Low BMI, hx of smoking     She is not on high risk medications such as glucocorticoids, anti-coagulants, anti-convulsants, chemotherapy or levothyroxine.    Patient deniesBreast cancer and Family history of breast cancer.      Menopause was at age: 33 years.  Hysterectomy at age 30 but no ovariectomy.No HRT.  Height loss of 4 inches    Past Medical History     Patient Active Problem List   Diagnosis     Pseudoarthrosis of lumbar spine     Low back pain     Fracture of lumbar vertebra (H)     Vitamin D deficiency     Scheuermann's kyphosis     Nontraumatic " compression fracture of T7 vertebra (H)     Cigarette nicotine dependence in remission     Acute pain of right hip     Kyphosis of thoracic region     Parathyroid adenoma     Hypertension, goal below 140/80     PVD (peripheral vascular disease) (H)     CVA (cerebral vascular accident) (H)     Barahona esophagus     Major depression, recurrent (H)     Stage 3a chronic kidney disease (H)     Arthropathy of right hip     Status post total hip replacement, right     Brain aneurysm     Iron deficiency anemia     Lactose intolerance     Pain medication agreement     History of colonic polyps     S/P lumbar spinal fusion     Lumbar radiculopathy     Lumbar pseudoarthrosis     Postoperative anemia     Urinary retention with incomplete bladder emptying     Severe sepsis (H)     Acute cystitis without hematuria     Hypokalemia     LADARIUS (acute kidney injury) (H)     Bacteremia due to Escherichia coli     CAD (coronary artery disease)     Pseudophakia, both eyes     Senile osteoporosis     Ureteral stone     Xerostomia     Aftercare following right knee joint replacement surgery     Fall at home     Primary osteoarthritis of right knee     Synovial cyst of left popliteal space     S/P TKR (total knee replacement), right     Cerebral infarction (H)     Diverticular disease of colon     Hyperlipidemia     Polyp of colon     Disorder of function of stomach     Esophagitis     Gastritis       Family History       family history includes Cerebral aneurysm in her sister.    Social History      reports that she quit smoking about 16 years ago. She has a 7.50 pack-year smoking history. She has never used smokeless tobacco. She reports current alcohol use. She reports that she does not use drugs.      Review of Systems     Patient denies current pain, limited mobility, fractures.   Remainder per HPI.      Vital Signs     /74 (BP Location: Right arm, Patient Position: Sitting, Cuff Size: Adult Regular)   Pulse 54   Ht 1.575 m (5'  "2.01\")   Wt 61.6 kg (135 lb 12.8 oz)   BMI 24.83 kg/m      Physical Exam     Constitutional:  Well developed, Well nourished  HENT:  Normocephalic,   Neck: normal in appearance  Eyes:  PERRL, Conjunctiva pink  Respiratory:  No respiratory distress  Skin: No acanthosis nigricans, lipoatrophy or lipodystrophy  Neurologic:  Alert & oriented x 3, nonfocal  Psychiatric:  Affect, Mood, Insight appropriate        Assessment     1. Senile osteoporosis        Plan     Susan will remain on the Alendronate for one more year. She will get another Dexa Scan done so we can see what is going on. Encouraged to take her Vit D supplement. She will f/u with me in 1 year.         Lena Friedman NP  [facility]  5/10/2022  12:04 PM      Current Medications     Outpatient Medications Prior to Visit   Medication Sig Dispense Refill     acetaminophen (TYLENOL) 500 MG tablet [ACETAMINOPHEN (TYLENOL) 500 MG TABLET] Take 1,000 mg by mouth every 6 (six) hours.       amLODIPine (NORVASC) 10 MG tablet [AMLODIPINE (NORVASC) 10 MG TABLET] Take 10 mg by mouth daily.       aspirin 81 mg chewable tablet [ASPIRIN 81 MG CHEWABLE TABLET] Chew 81 mg daily.       calcium carbonate-vitamin D3 600 mg(1,500mg) -800 unit per tablet [CALCIUM CARBONATE-VITAMIN D3 600 MG(1,500MG) -800 UNIT PER TABLET] Take 1 tablet by mouth daily.       cholecalciferol, vitamin D3, 1,000 unit tablet [CHOLECALCIFEROL, VITAMIN D3, 1,000 UNIT TABLET] Take 3,000 Units by mouth daily.       clopidogrel (PLAVIX) 75 mg tablet [CLOPIDOGREL (PLAVIX) 75 MG TABLET] Take 75 mg by mouth daily.       cyanocobalamin 1000 MCG tablet [CYANOCOBALAMIN 1000 MCG TABLET] Take 1,000 mcg by mouth daily.       esomeprazole (NEXIUM) 20 MG capsule [ESOMEPRAZOLE (NEXIUM) 20 MG CAPSULE] Take 20 mg by mouth daily before breakfast.       gabapentin (NEURONTIN) 100 MG capsule Take 1 cap at bedtime, increase by 1 cap every three days, max dose 3 caps tid (Patient taking differently: 300 mg 3 times daily " Take 1 cap at bedtime, increase by 1 cap every three days, max dose 3 caps tid) 270 capsule 0     hydroCHLOROthiazide (HYDRODIURIL) 25 MG tablet [HYDROCHLOROTHIAZIDE (HYDRODIURIL) 25 MG TABLET] Take 25 mg by mouth daily.       lisinopriL (PRINIVIL,ZESTRIL) 40 MG tablet [LISINOPRIL (PRINIVIL,ZESTRIL) 40 MG TABLET] Take 40 mg by mouth daily.       mirtazapine (REMERON) 30 MG tablet [MIRTAZAPINE (REMERON) 30 MG TABLET] Take 30 mg by mouth bedtime.       MULTIVITAMIN ORAL [MULTIVITAMIN ORAL] Take 1 tablet by mouth daily.        pravastatin (PRAVACHOL) 40 MG tablet [PRAVASTATIN (PRAVACHOL) 40 MG TABLET] Take 40 mg by mouth bedtime.       sertraline (ZOLOFT) 50 MG tablet [SERTRALINE (ZOLOFT) 50 MG TABLET] TAKE 3 TABLETS BY MOUTH EVERY MORNING       HYDROcodone-acetaminophen (NORCO) 5-325 MG tablet Take 1 tablet by mouth every 6 hours as needed for pain (Patient not taking: Reported on 5/10/2022) 18 tablet 0     naloxone (NARCAN) 4 MG/0.1ML nasal spray Spray 1 spray (4 mg) into one nostril alternating nostrils once as needed for opioid reversal every 2-3 minutes until assistance arrives (Patient not taking: Reported on 5/10/2022) 0.2 mL 1     senna (SENOKOT) 8.6 mg tablet Take 2 tablets by mouth 2 times daily  (Patient not taking: Reported on 5/10/2022)       alendronate (FOSAMAX) 70 MG tablet [ALENDRONATE (FOSAMAX) 70 MG TABLET] TAKE 1 TABLET BY MOUTH EVERY 7 DAYS. TAKE IN THE MORNING ON AN EMPTY STOMACH WITH A FULL GLASS OF WATER 30 MINUTES BEFORE FOOD. 12 tablet 3     gabapentin (NEURONTIN) 100 MG capsule [GABAPENTIN (NEURONTIN) 100 MG CAPSULE] Take 100 mg by mouth 3 (three) times a day. Gradually taper up per your doctors instructions 90 capsule 3     naloxone (NARCAN) 4 MG/0.1ML nasal spray Spray 1 spray (4 mg) into one nostril alternating nostrils once as needed for opioid reversal every 2-3 minutes until assistance arrives 0.2 mL 0     No facility-administered medications prior to visit.         Lab Results     No  results found for: TSH, PHOS, IRON, VITD25        Imaging Results   Last DEXA scan:  No valid procedures specified.

## 2022-05-13 ENCOUNTER — TELEPHONE (OUTPATIENT)
Dept: NEUROSURGERY | Facility: CLINIC | Age: 77
End: 2022-05-13
Payer: MEDICARE

## 2022-05-13 NOTE — TELEPHONE ENCOUNTER
Camila MORALES - from [facility] calling - patient states that she was certified for medical marijana from Dr. Jo - would just like to have some questions answered. If you could please call Camila #[phone]

## 2022-05-13 NOTE — TELEPHONE ENCOUNTER
Called Susan and explained that Dr. Jo referred patient to a Pain Clinic on 02/22/2022. Furthermore, Dr. Jo did not certify her for medical marijuana.  Tania York RN, CNRN

## 2022-10-03 ENCOUNTER — ANCILLARY PROCEDURE (OUTPATIENT)
Dept: BONE DENSITY | Facility: CLINIC | Age: 77
End: 2022-10-03
Attending: NURSE PRACTITIONER
Payer: MEDICARE

## 2022-10-03 DIAGNOSIS — Z78.0 POST-MENOPAUSAL: ICD-10-CM

## 2022-10-03 DIAGNOSIS — M81.0 SENILE OSTEOPOROSIS: ICD-10-CM

## 2022-10-03 PROCEDURE — 77081 DXA BONE DENSITY APPENDICULR: CPT | Performed by: INTERNAL MEDICINE

## 2022-10-03 PROCEDURE — 77080 DXA BONE DENSITY AXIAL: CPT | Mod: XU | Performed by: INTERNAL MEDICINE

## 2022-10-20 ENCOUNTER — TELEPHONE (OUTPATIENT)
Dept: ENDOCRINOLOGY | Facility: CLINIC | Age: 77
End: 2022-10-20

## 2022-10-20 NOTE — TELEPHONE ENCOUNTER
Patient has been scheduled 11/9/2022 3:30PM in person , does she need labs and can they be done same day due to transportation with [employer]? Please let patient know. Thank you .

## 2022-10-20 NOTE — TELEPHONE ENCOUNTER
----- Message from Lena Friedman NP sent at 10/20/2022  2:51 PM CDT -----  This patient is failing her Alendronate therapy. I would like to start her on Prolia, but need to discuss it with her - could you please contact her and make an appointment?  I am not scheduled to see her until next year.  Thanks

## 2022-10-20 NOTE — TELEPHONE ENCOUNTER
LM for pt to c/b. Please schedule pt sooner appt. Okay to use [facility] spot on Tuesday or Wednesday in person okay.

## 2022-11-09 ENCOUNTER — OFFICE VISIT (OUTPATIENT)
Dept: ENDOCRINOLOGY | Facility: CLINIC | Age: 77
End: 2022-11-09
Payer: MEDICARE

## 2022-11-09 VITALS — DIASTOLIC BLOOD PRESSURE: 64 MMHG | WEIGHT: 126 LBS | SYSTOLIC BLOOD PRESSURE: 194 MMHG | BODY MASS INDEX: 23.04 KG/M2

## 2022-11-09 DIAGNOSIS — M81.0 SENILE OSTEOPOROSIS: Primary | ICD-10-CM

## 2022-11-09 DIAGNOSIS — Z92.29 PERSONAL HISTORY OF OTHER DRUG THERAPY: ICD-10-CM

## 2022-11-09 PROCEDURE — 99213 OFFICE O/P EST LOW 20 MIN: CPT | Performed by: NURSE PRACTITIONER

## 2022-11-09 NOTE — LETTER
2022         RE: Susan Patrick  [address] [id number]        Dear Colleague,    Thank you for referring your patient, Susan Patrick, to the [facility]. Please see a copy of my visit note below.    [facility]  ENDOCRINOLOGY    Osteoporosis Follow Up 11/10/2022    Susan Patrick, 1945, [id number]          Reason for visit      1. Senile osteoporosis    2. Personal history of other drug therapy        History     Susan Patrick is a very pleasant 76 year old old female who presents for follow up.   SUMMARY:  Diagnosed with osteoporosis  In .   DXA scan is noted below indicating a lowest T-score of -2.7 at the left femoral neck. Started on reclast after this DXA scan- no prior DXA scan at this facility is 1 from [facility] noted below..   Current and prior treatments include Reclast x2 doses  Risk factors include age, gender, , early menopause, former smoker  The following high- risk conditions have been ruled out: celiac disease, eating disorders, gastric bypass, hyperparathyroidism, inflammatory bowel disease, hyperthyroidism, rheumatoid arthritis, lupus, chronic kidney disease.  She is not on high risk medications such as glucocorticoids, anti-coagulants, anti-convulsants, chemotherapy or levothyroxine.   Personal or family history of kidney stones is negative.  Family history of osteoporosis is Unknown- mother  young-47     Calcium intake 650mg calcium supplements;16oz of milk daily as well. Occasional cheese and yogurt- 4x/week  Vitamin D intake 4000 IU D3 ( 2x 2000IU daily)  Weight bearing exercise Unable  Denied tobacco use- former smoker quit in .   Denies alcohol use  There is a stress fracture across the L2 pedicles and she is here to determine next steps in treatment.  Back surgery was in .  In 2014 she saw Dr. Denise at [facility] and was advised to start on treatment but did not do so immediately.  Prior  to that she saw another physician Dr. Stout who recommended Fosamax in 2013 as osteoporosis had already been diagnosed.  She therefore went untreated for at least 2 years.  She does not give a particular reason for not taking medications.  They lumbar fracture appears to have been atraumatic.  She's not aware of any family history of osteoporosis.  She has received at least one cortisone injection through Hospital in her back.  She also has right knee arthritis which has been giving her limitations in mobility.  Prior fractures in 2013 include her falling off a step and fracturing her wrist in her right hip    TODAY:    I asked Susan to come in today to discuss the findings in her recent Dexa Scan, which is included in this note.  Since the scan in August 2018, bone density has declined a significant 10.8% in the left total hip.  Bone density has increased 9.1% at L1-L2 on the AP spine.  However, some of this increase may be related to degenerative change. She has been taking Alendronate for almost 5 years now, and clearly, with the recent findings, is failing it. The only treatment that we have left in the arsenal is Prolia.       Risk Factors     The following high- risk conditions have been ruled out: celiac disease, eating disorders, gastric bypass, hyperparathyroidism, inflammatory bowel disease, hyperthyroidism, rheumatoid arthritis, lupus, chronic kidney disease.     Susan Patrick has the following risk factors: Age, Female gender,  and Low BMI, hx of smoking     She is not on high risk medications such as glucocorticoids, anti-coagulants, anti-convulsants, chemotherapy or levothyroxine.    Patient deniesBreast cancer and Family history of breast cancer.      Menopause was at age: 33 years.  Hysterectomy at age 30 but no ovariectomy.No HRT.  Height loss of 4 inches    Past Medical History     Patient Active Problem List   Diagnosis     Pseudoarthrosis of lumbar spine     Low back pain     Fracture  of lumbar vertebra (H)     Vitamin D deficiency     Scheuermann's kyphosis     Nontraumatic compression fracture of T7 vertebra (H)     Cigarette nicotine dependence in remission     Acute pain of right hip     Kyphosis of thoracic region     Parathyroid adenoma     Hypertension, goal below 140/80     PVD (peripheral vascular disease) (H)     CVA (cerebral vascular accident) (H)     [place] esophagus     Major depression, recurrent (H)     Stage 3a chronic kidney disease (H)     Arthropathy of right hip     Status post total hip replacement, right     Brain aneurysm     Iron deficiency anemia     Lactose intolerance     Pain medication agreement     History of colonic polyps     S/P lumbar spinal fusion     Lumbar radiculopathy     Lumbar pseudoarthrosis     Postoperative anemia     Urinary retention with incomplete bladder emptying     Severe sepsis (H)     Acute cystitis without hematuria     Hypokalemia     LADARIUS (acute kidney injury) (H)     Bacteremia due to Escherichia coli     CAD (coronary artery disease)     Pseudophakia, both eyes     Senile osteoporosis     Ureteral stone     Xerostomia     Aftercare following right knee joint replacement surgery     Fall at home     Primary osteoarthritis of right knee     Synovial cyst of left popliteal space     S/P TKR (total knee replacement), right     Cerebral infarction (H)     Diverticular disease of colon     Hyperlipidemia     Polyp of colon     Disorder of function of stomach     Esophagitis     Gastritis       Family History       family history includes Cerebral aneurysm in her sister.    Social History      reports that she quit smoking about 16 years ago. She has a 7.50 pack-year smoking history. She has never used smokeless tobacco. She reports current alcohol use. She reports that she does not use drugs.      Review of Systems     Patient denies current pain, limited mobility, fractures.   Remainder per HPI.      Vital Signs     BP (!) 194/64 (BP Location:  Right arm, Patient Position: Sitting, Cuff Size: Adult Regular)   Wt 57.2 kg (126 lb)   BMI 23.04 kg/m      Physical Exam     Constitutional:  Well developed, Well nourished  HENT:  Normocephalic,   Neck: normal in appearance  Eyes:  PERRL, Conjunctiva pink  Respiratory:  No respiratory distress  Skin: No acanthosis nigricans, lipoatrophy or lipodystrophy  Neurologic:  Alert & oriented x 3, nonfocal  Psychiatric:  Affect, Mood, Insight appropriate        Assessment     1. Senile osteoporosis    2. Personal history of other drug therapy        Plan     Prolia fully discussed today. Pt has full upper and lower plates and no teeth left to extract. Will put in for a PA and get her started as soon as possible. Instructed to stop the Alendronate. Will f/u with me in May as scheduled.        Lena Friedman NP  [facility]  11/10/2022  6:38 AM        Current Medications     Outpatient Medications Prior to Visit   Medication Sig Dispense Refill     acetaminophen (TYLENOL) 500 MG tablet [ACETAMINOPHEN (TYLENOL) 500 MG TABLET] Take 1,000 mg by mouth every 6 (six) hours.       alendronate (FOSAMAX) 70 MG tablet [ALENDRONATE (FOSAMAX) 70 MG TABLET] TAKE 1 TABLET BY MOUTH EVERY 7 DAYS. TAKE IN THE MORNING ON AN EMPTY STOMACH WITH A FULL GLASS OF WATER 30 MINUTES BEFORE FOOD. 12 tablet 3     amLODIPine (NORVASC) 10 MG tablet [AMLODIPINE (NORVASC) 10 MG TABLET] Take 10 mg by mouth daily.       aspirin 81 mg chewable tablet [ASPIRIN 81 MG CHEWABLE TABLET] Chew 81 mg daily.       calcium carbonate-vitamin D3 600 mg(1,500mg) -800 unit per tablet [CALCIUM CARBONATE-VITAMIN D3 600 MG(1,500MG) -800 UNIT PER TABLET] Take 1 tablet by mouth daily.       cholecalciferol, vitamin D3, 1,000 unit tablet [CHOLECALCIFEROL, VITAMIN D3, 1,000 UNIT TABLET] Take 3,000 Units by mouth daily.       clopidogrel (PLAVIX) 75 mg tablet [CLOPIDOGREL (PLAVIX) 75 MG TABLET] Take 75 mg by mouth daily.       cyanocobalamin 1000 MCG tablet [CYANOCOBALAMIN  1000 MCG TABLET] Take 1,000 mcg by mouth daily.       esomeprazole (NEXIUM) 20 MG capsule [ESOMEPRAZOLE (NEXIUM) 20 MG CAPSULE] Take 20 mg by mouth daily before breakfast.       gabapentin (NEURONTIN) 100 MG capsule Take 1 cap at bedtime, increase by 1 cap every three days, max dose 3 caps tid (Patient taking differently: 300 mg 3 times daily Take 1 cap at bedtime, increase by 1 cap every three days, max dose 3 caps tid) 270 capsule 0     hydroCHLOROthiazide (HYDRODIURIL) 25 MG tablet [HYDROCHLOROTHIAZIDE (HYDRODIURIL) 25 MG TABLET] Take 25 mg by mouth daily.       HYDROcodone-acetaminophen (NORCO) 5-325 MG tablet Take 1 tablet by mouth every 6 hours as needed for pain 18 tablet 0     lisinopriL (PRINIVIL,ZESTRIL) 40 MG tablet [LISINOPRIL (PRINIVIL,ZESTRIL) 40 MG TABLET] Take 40 mg by mouth daily.       mirtazapine (REMERON) 30 MG tablet [MIRTAZAPINE (REMERON) 30 MG TABLET] Take 30 mg by mouth bedtime.       MULTIVITAMIN ORAL [MULTIVITAMIN ORAL] Take 1 tablet by mouth daily.        naloxone (NARCAN) 4 MG/0.1ML nasal spray Spray 1 spray (4 mg) into one nostril alternating nostrils once as needed for opioid reversal every 2-3 minutes until assistance arrives 0.2 mL 1     pravastatin (PRAVACHOL) 40 MG tablet [PRAVASTATIN (PRAVACHOL) 40 MG TABLET] Take 40 mg by mouth bedtime.       senna (SENOKOT) 8.6 mg tablet Take 2 tablets by mouth 2 times daily       sertraline (ZOLOFT) 50 MG tablet [SERTRALINE (ZOLOFT) 50 MG TABLET] TAKE 3 TABLETS BY MOUTH EVERY MORNING       No facility-administered medications prior to visit.         Lab Results     No results found for: TSH, PHOS, IRON, VITD25        Imaging Results   Last DEXA scan:  No valid procedures specified.    Study Result    Narrative & Impression    10/3/2022        RE: Susan Patrick  YOB: 1945           Dear Lena Friedman,     Patient Profile:  76 year old female, postmenopausal, is here for the follow up bone density test.   History of fractures -  Yes; Wrist and Hip. Family history of osteoporosis - None.  Family history of hip fracture: None. Smoking history - Past. Osteoporosis treatment past -  No. Osteoporosis treatment current - Yes; Bisphosphonates.  Chronic medical problems - Hip replacement, History of kidney stones, Hysterectomy and Spine surgery. High risk medications -  Blood thinner (Coumadin or Heparin); Yes, Currently.     Assessment:     1. The spine bone density is assessed using L1-L2 with T-score of 0.9.  2. Femoral bone densities show left femoral neck T- score of -3.0.  Bone density cannot be assessed in the right hip due to previous right hip fracture.  3.  Bone density was also checked in the wrist as an alternate site since the spine measurement was influenced by degenerative and postsurgical changes, and since the right hip could not be assessed.  In the  33% radius, T score is -1.5.  4.  Since the scan in August 2018, bone density has declined a significant 10.8% in the left total hip.  Bone density has increased 9.1% at L1-L2 on the AP spine.  However, some of this increase may be related to degenerative change.  5.  The trabecular bone score predicts moderate microarchitecture     76 year old female with OSTEOPOROSIS and HIGH predicted future fracture risk.     Recommendations:  Given the significant decline in bone density in the hip since last scan, a reassessment of current management is advised with a recheck in 1 year.        Bone densitometry was performed on your patient using our [employer] densitometer. The results are summarized and a copy of the actual scans are included for your review. In conformity with the International Society of Clinical Densitometry's most recent position statement for DXA interpretation (2015), the diagnosis will be made on the lowest measured T-score of the lumbar spine, femoral neck, total proximal femur or 33% radius. Note the change in terminology for diagnostic classification from  OSTEOPENIA to LOW BONE MASS. All trending for sequential exams will be done using multiple vertebrae or the total proximal femur. Fracture risk is based on the WHO Fracture Risk Assessment Tool (FRAX). If additional information is needed or if you would like to discuss the results, please do not hesitate to call me.         Thank you for referring this patient to [facility]. We are happy to be of service in support of you and your practice. If you have any questions or suggestions to improve our service, please call me at [phone].      Sincerely,      Willis Connors M.D. C.C.D.  Osteoporosis Services, [facility]           Again, thank you for allowing me to participate in the care of your patient.        Sincerely,        Lena Friedman NP

## 2022-11-10 NOTE — PROGRESS NOTES
[facility]  ENDOCRINOLOGY    Osteoporosis Follow Up 11/10/2022    Susan Patrick, 1945, [id number]          Reason for visit      1. Senile osteoporosis    2. Personal history of other drug therapy        History     Susan Patrick is a very pleasant 76 year old old female who presents for follow up.   SUMMARY:  Diagnosed with osteoporosis  In .   DXA scan is noted below indicating a lowest T-score of -2.7 at the left femoral neck. Started on reclast after this DXA scan- no prior DXA scan at this facility is 1 from [employer] noted below..   Current and prior treatments include Reclast x2 doses  Risk factors include age, gender, , early menopause, former smoker  The following high- risk conditions have been ruled out: celiac disease, eating disorders, gastric bypass, hyperparathyroidism, inflammatory bowel disease, hyperthyroidism, rheumatoid arthritis, lupus, chronic kidney disease.  She is not on high risk medications such as glucocorticoids, anti-coagulants, anti-convulsants, chemotherapy or levothyroxine.   Personal or family history of kidney stones is negative.  Family history of osteoporosis is Unknown- mother  young-47     Calcium intake 650mg calcium supplements;16oz of milk daily as well. Occasional cheese and yogurt- 4x/week  Vitamin D intake 4000 IU D3 ( 2x 2000IU daily)  Weight bearing exercise Unable  Denied tobacco use- former smoker quit in .   Denies alcohol use  There is a stress fracture across the L2 pedicles and she is here to determine next steps in treatment.  Back surgery was in .  In 2014 she saw Dr. Denise at [facility] and was advised to start on treatment but did not do so immediately.  Prior to that she saw another physician Dr. Stout who recommended Fosamax in  as osteoporosis had already been diagnosed.  She therefore went untreated for at least 2 years.  She does not give a particular reason for not taking medications.  They lumbar  fracture appears to have been atraumatic.  She's not aware of any family history of osteoporosis.  She has received at least one cortisone injection through Hospital in her back.  She also has right knee arthritis which has been giving her limitations in mobility.  Prior fractures in 2013 include her falling off a step and fracturing her wrist in her right hip    TODAY:    I asked Susan to come in today to discuss the findings in her recent Dexa Scan, which is included in this note.  Since the scan in August 2018, bone density has declined a significant 10.8% in the left total hip.  Bone density has increased 9.1% at L1-L2 on the AP spine.  However, some of this increase may be related to degenerative change. She has been taking Alendronate for almost 5 years now, and clearly, with the recent findings, is failing it. The only treatment that we have left in the arsenal is Prolia.       Risk Factors     The following high- risk conditions have been ruled out: celiac disease, eating disorders, gastric bypass, hyperparathyroidism, inflammatory bowel disease, hyperthyroidism, rheumatoid arthritis, lupus, chronic kidney disease.     Susan Patrick has the following risk factors: Age, Female gender,  and Low BMI, hx of smoking     She is not on high risk medications such as glucocorticoids, anti-coagulants, anti-convulsants, chemotherapy or levothyroxine.    Patient deniesBreast cancer and Family history of breast cancer.      Menopause was at age: 33 years.  Hysterectomy at age 30 but no ovariectomy.No HRT.  Height loss of 4 inches    Past Medical History     Patient Active Problem List   Diagnosis     Pseudoarthrosis of lumbar spine     Low back pain     Fracture of lumbar vertebra (H)     Vitamin D deficiency     Scheuermann's kyphosis     Nontraumatic compression fracture of T7 vertebra (H)     Cigarette nicotine dependence in remission     Acute pain of right hip     Kyphosis of thoracic region      Parathyroid adenoma     Hypertension, goal below 140/80     PVD (peripheral vascular disease) (H)     CVA (cerebral vascular accident) (H)     [place] esophagus     Major depression, recurrent (H)     Stage 3a chronic kidney disease (H)     Arthropathy of right hip     Status post total hip replacement, right     Brain aneurysm     Iron deficiency anemia     Lactose intolerance     Pain medication agreement     History of colonic polyps     S/P lumbar spinal fusion     Lumbar radiculopathy     Lumbar pseudoarthrosis     Postoperative anemia     Urinary retention with incomplete bladder emptying     Severe sepsis (H)     Acute cystitis without hematuria     Hypokalemia     LADARIUS (acute kidney injury) (H)     Bacteremia due to Escherichia coli     CAD (coronary artery disease)     Pseudophakia, both eyes     Senile osteoporosis     Ureteral stone     Xerostomia     Aftercare following right knee joint replacement surgery     Fall at home     Primary osteoarthritis of right knee     Synovial cyst of left popliteal space     S/P TKR (total knee replacement), right     Cerebral infarction (H)     Diverticular disease of colon     Hyperlipidemia     Polyp of colon     Disorder of function of stomach     Esophagitis     Gastritis       Family History       family history includes Cerebral aneurysm in her sister.    Social History      reports that she quit smoking about 16 years ago. She has a 7.50 pack-year smoking history. She has never used smokeless tobacco. She reports current alcohol use. She reports that she does not use drugs.      Review of Systems     Patient denies current pain, limited mobility, fractures.   Remainder per HPI.      Vital Signs     BP (!) 194/64 (BP Location: Right arm, Patient Position: Sitting, Cuff Size: Adult Regular)   Wt 57.2 kg (126 lb)   BMI 23.04 kg/m      Physical Exam     Constitutional:  Well developed, Well nourished  HENT:  Normocephalic,   Neck: normal in appearance  Eyes:  PERRL,  Conjunctiva pink  Respiratory:  No respiratory distress  Skin: No acanthosis nigricans, lipoatrophy or lipodystrophy  Neurologic:  Alert & oriented x 3, nonfocal  Psychiatric:  Affect, Mood, Insight appropriate        Assessment     1. Senile osteoporosis    2. Personal history of other drug therapy        Plan     Prolia fully discussed today. Pt has full upper and lower plates and no teeth left to extract. Will put in for a PA and get her started as soon as possible. Instructed to stop the Alendronate. Will f/u with me in May as scheduled.        Lena Friedman NP  [facility]  11/10/2022  6:38 AM        Current Medications     Outpatient Medications Prior to Visit   Medication Sig Dispense Refill     acetaminophen (TYLENOL) 500 MG tablet [ACETAMINOPHEN (TYLENOL) 500 MG TABLET] Take 1,000 mg by mouth every 6 (six) hours.       alendronate (FOSAMAX) 70 MG tablet [ALENDRONATE (FOSAMAX) 70 MG TABLET] TAKE 1 TABLET BY MOUTH EVERY 7 DAYS. TAKE IN THE MORNING ON AN EMPTY STOMACH WITH A FULL GLASS OF WATER 30 MINUTES BEFORE FOOD. 12 tablet 3     amLODIPine (NORVASC) 10 MG tablet [AMLODIPINE (NORVASC) 10 MG TABLET] Take 10 mg by mouth daily.       aspirin 81 mg chewable tablet [ASPIRIN 81 MG CHEWABLE TABLET] Chew 81 mg daily.       calcium carbonate-vitamin D3 600 mg(1,500mg) -800 unit per tablet [CALCIUM CARBONATE-VITAMIN D3 600 MG(1,500MG) -800 UNIT PER TABLET] Take 1 tablet by mouth daily.       cholecalciferol, vitamin D3, 1,000 unit tablet [CHOLECALCIFEROL, VITAMIN D3, 1,000 UNIT TABLET] Take 3,000 Units by mouth daily.       clopidogrel (PLAVIX) 75 mg tablet [CLOPIDOGREL (PLAVIX) 75 MG TABLET] Take 75 mg by mouth daily.       cyanocobalamin 1000 MCG tablet [CYANOCOBALAMIN 1000 MCG TABLET] Take 1,000 mcg by mouth daily.       esomeprazole (NEXIUM) 20 MG capsule [ESOMEPRAZOLE (NEXIUM) 20 MG CAPSULE] Take 20 mg by mouth daily before breakfast.       gabapentin (NEURONTIN) 100 MG capsule Take 1 cap at bedtime,  increase by 1 cap every three days, max dose 3 caps tid (Patient taking differently: 300 mg 3 times daily Take 1 cap at bedtime, increase by 1 cap every three days, max dose 3 caps tid) 270 capsule 0     hydroCHLOROthiazide (HYDRODIURIL) 25 MG tablet [HYDROCHLOROTHIAZIDE (HYDRODIURIL) 25 MG TABLET] Take 25 mg by mouth daily.       HYDROcodone-acetaminophen (NORCO) 5-325 MG tablet Take 1 tablet by mouth every 6 hours as needed for pain 18 tablet 0     lisinopriL (PRINIVIL,ZESTRIL) 40 MG tablet [LISINOPRIL (PRINIVIL,ZESTRIL) 40 MG TABLET] Take 40 mg by mouth daily.       mirtazapine (REMERON) 30 MG tablet [MIRTAZAPINE (REMERON) 30 MG TABLET] Take 30 mg by mouth bedtime.       MULTIVITAMIN ORAL [MULTIVITAMIN ORAL] Take 1 tablet by mouth daily.        naloxone (NARCAN) 4 MG/0.1ML nasal spray Spray 1 spray (4 mg) into one nostril alternating nostrils once as needed for opioid reversal every 2-3 minutes until assistance arrives 0.2 mL 1     pravastatin (PRAVACHOL) 40 MG tablet [PRAVASTATIN (PRAVACHOL) 40 MG TABLET] Take 40 mg by mouth bedtime.       senna (SENOKOT) 8.6 mg tablet Take 2 tablets by mouth 2 times daily       sertraline (ZOLOFT) 50 MG tablet [SERTRALINE (ZOLOFT) 50 MG TABLET] TAKE 3 TABLETS BY MOUTH EVERY MORNING       No facility-administered medications prior to visit.         Lab Results     No results found for: TSH, PHOS, IRON, VITD25        Imaging Results   Last DEXA scan:  No valid procedures specified.    Study Result    Narrative & Impression    10/3/2022        RE: Susan Patrick  YOB: 1945           Dear Lena Friedman,     Patient Profile:  76 year old female, postmenopausal, is here for the follow up bone density test.   History of fractures - Yes; Wrist and Hip. Family history of osteoporosis - None.  Family history of hip fracture: None. Smoking history - Past. Osteoporosis treatment past -  No. Osteoporosis treatment current - Yes; Bisphosphonates.  Chronic medical problems -  Hip replacement, History of kidney stones, Hysterectomy and Spine surgery. High risk medications -  Blood thinner (Coumadin or Heparin); Yes, Currently.     Assessment:     1. The spine bone density is assessed using L1-L2 with T-score of 0.9.  2. Femoral bone densities show left femoral neck T- score of -3.0.  Bone density cannot be assessed in the right hip due to previous right hip fracture.  3.  Bone density was also checked in the wrist as an alternate site since the spine measurement was influenced by degenerative and postsurgical changes, and since the right hip could not be assessed.  In the  33% radius, T score is -1.5.  4.  Since the scan in August 2018, bone density has declined a significant 10.8% in the left total hip.  Bone density has increased 9.1% at L1-L2 on the AP spine.  However, some of this increase may be related to degenerative change.  5.  The trabecular bone score predicts moderate microarchitecture     76 year old female with OSTEOPOROSIS and HIGH predicted future fracture risk.     Recommendations:  Given the significant decline in bone density in the hip since last scan, a reassessment of current management is advised with a recheck in 1 year.        Bone densitometry was performed on your patient using our FOLUP densitometer. The results are summarized and a copy of the actual scans are included for your review. In conformity with the International Society of Clinical Densitometry's most recent position statement for DXA interpretation (2015), the diagnosis will be made on the lowest measured T-score of the lumbar spine, femoral neck, total proximal femur or 33% radius. Note the change in terminology for diagnostic classification from OSTEOPENIA to LOW BONE MASS. All trending for sequential exams will be done using multiple vertebrae or the total proximal femur. Fracture risk is based on the WHO Fracture Risk Assessment Tool (FRAX). If additional information is needed or if you  would like to discuss the results, please do not hesitate to call me.         Thank you for referring this patient to [facility]. We are happy to be of service in support of you and your practice. If you have any questions or suggestions to improve our service, please call me at [phone].      Sincerely,      Willis Connors M.D. [place].  Osteoporosis Services, [facility]

## 2022-12-13 ENCOUNTER — ALLIED HEALTH/NURSE VISIT (OUTPATIENT)
Dept: ENDOCRINOLOGY | Facility: CLINIC | Age: 77
End: 2022-12-13
Payer: MEDICARE

## 2022-12-13 DIAGNOSIS — M81.0 SENILE OSTEOPOROSIS: Primary | ICD-10-CM

## 2022-12-13 PROCEDURE — 99207 PR NO CHARGE NURSE ONLY: CPT

## 2022-12-13 PROCEDURE — 96372 THER/PROPH/DIAG INJ SC/IM: CPT | Performed by: NURSE PRACTITIONER

## 2022-12-13 NOTE — PROGRESS NOTES
"Prolia Injection Phone Screen      Screening questions have been asked 2-3 days prior to administration visit for Prolia. If any questions are answered with \"Yes,\" this phone encounter were will routed to ordering provider for further evaluation.     1.  When was the last injection?  Firstinj    2.  Has insurance for this injection been verified?  Yes    3.  Did you experience any new onset achiness or rashes that lasted for over a month with your previous Prolia injection?   No    4.  Do you have a fever over 101?F or a new deep cough that is unusual for you today? No    5.  Have you started any new medications in the last 6 months that you were told could affect your immune system? These may have been prescribed by oncologist, transplant, rheumatology, or dermatology.   No    6.  In the last 6 months have you have gastric bypass or parathyroid surgery?   No    7.  Do you plan dental work requiring drilling into the bone such as implants/extractions or oral surgery in the next 2-3 months?   No    8. Do you have new insurance since the last injection?    Patient informed if symptoms discussed above present prior to their administration appointment, they are to notify clinic immediately.     Yesi Polanco RN          The following steps were completed to comply with the REMS program for Prolia:  1. Ordering provider has previously reviewed information in the Medication Guide and Patient Counseling Chart, including the serious risks of Prolia  and the symptoms of each risk and have been advised to seek prompt medical attention if they have signs or symptoms of any of the serious risks.  2. Provided each patient a copy of the Medication Guide and Patient Brochure.  See MAR for administration details.   Indication: Prolia  (denosumab) is a prescription medicine used to treat osteoporosis in patients who:   Are at high risk for fracture, meaning patients who have had a fracture related to osteoporosis, or who have " multiple risk factors for fracture; Cannot use another osteoporosis medicine or other osteoporosis medicines did not work well.   The timeline for early/late injections would be 4 weeks early and any time after the 6 month lorie. If a patient receives their injection late, then the subsequent injection would be 6 months from the date that they actually received the injection    Have the screening questions been asked prior to this administration? Yes      The following medication was given:     MEDICATION: Denosumab (Prolia) 60 mg/ml SOLN  ROUTE: SQ  SITE: Arm - Left  DOSE: 60 mg  LOT #: [id number]  :  All At Home  EXPIRATION DATE:  04/30/2025  NDC#: see mar

## 2023-05-24 ENCOUNTER — TELEPHONE (OUTPATIENT)
Dept: ENDOCRINOLOGY | Facility: CLINIC | Age: 78
End: 2023-05-24

## 2023-05-24 DIAGNOSIS — M81.0 SENILE OSTEOPOROSIS: Primary | ICD-10-CM

## 2023-06-20 ENCOUNTER — TELEPHONE (OUTPATIENT)
Dept: ENDOCRINOLOGY | Facility: CLINIC | Age: 78
End: 2023-06-20

## 2023-06-20 NOTE — TELEPHONE ENCOUNTER
Patient called. She canceled her Prolia injection because she wasn't able to get labs done before the injection. She will reschedule the Prolia once she completes the labs  .  She has an appointment with her PCP next week and would like to have labs done at the same time and will need an order.     Please fax lab orders to  [facility] [phone] ATT: Dr. Cruz.

## 2023-11-07 ENCOUNTER — TELEPHONE (OUTPATIENT)
Dept: ENDOCRINOLOGY | Facility: CLINIC | Age: 78
End: 2023-11-07
Payer: MEDICARE

## 2023-11-07 DIAGNOSIS — Z92.29 PERSONAL HISTORY OF OTHER DRUG THERAPY: ICD-10-CM

## 2023-11-07 DIAGNOSIS — M81.0 SENILE OSTEOPOROSIS: Primary | ICD-10-CM

## 2023-11-09 NOTE — TELEPHONE ENCOUNTER
Per patient she will be continuing follow up care with [facility] for prolia does not wish to schedule endo at this time.

## 2024-01-15 NOTE — PROGRESS NOTES
[facility]      Facility:    [facility] [[id number]]  Code Status: FULL CODE      Chief Complaint/Reason for Visit:  Chief Complaint   Patient presents with     H & P     Elective right total knee arthroplasty, history of peripheral vascular disease, essential hypertension, cerebral infarction in the past, iron deficiency anemia, coronary disease, stage IIIa chronic kidney disease.  Pain management.       HPI:   Susan is a 75 y.o. female who only admitted to the hospital on 4/26/2021 for elective right total knee arthroplasty.  This was done secondary degenerative joint disease it was refractory to conservative management.  She underwent the procedure without any perioperative postoperative complications and she was followed by the hospital service.  There were no postoperative complications and hemoglobin was stable on the day of discharge.  Patient was treated appropriately and transferred here to the TCU at [facility] in stable condition.    Patient was discharged on the following medications one hydrocodone/acetaminophen 5/325 1-2 tabs every 4 hours as needed for pain she is also on acetaminophen 500 mg 1000 mg every 6 hours.  That would good give her 4 g maximum dose of acetaminophen in a 24-hour.  Add the Norco to this and she would be well over the maximum.    Patient is lying in bed comfortably does not appear to be in acute distress she claims her pain is under adequate control when she is at rest but when she is ambulating with physical therapy she does have some pain.  I am unsure of the consistency of her acetaminophen and hydrocodone however given the fact that she is on scheduled acetaminophen will likely just go with the hydrocodone/acetaminophen she claims that is working for the pain.  She is moving her bowels at this time and wants to hold off on the senna and she denies any other issues at this time.    Past Medical History:  Past Medical  SCHEDULE XRAY:    VISTA: [phone]   History:   Diagnosis Date     Acute cystitis without hematuria      Acute pain of right hip 11/14/2016    Formatting of this note might be different from the original. December 2016, Seen at [facility] by neurosurgery, pain confirmed to be coming from her hip after an injection, referred to orthopedist. 6/2017 note from the spine specialist. Dr. Saskia Gastelum. Now seeing Dr. Ortiz at [facility] for hip pain. may need replacement.     Aftercare following right knee joint replacement surgery 12/18/2012    Formatting of this note might be different from the original. 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  Uses Reglan, once daily 5/14/2013 Barahona's, repeat 3 years 12/2015 Barretts without dysplasia, repeat in 3 years.   Formatting of this note might be different from the original. 2010, patient recalls scope, patient recalls was told to recheck in 5 years.  Uses Reglan, once daily 5/14/2013 Barahona's, repeat 3      Barahona's esophagus      Brain aneurysm 8/1/2006    Overview:  Left  Thrombotic--aneursym clip  Formatting of this note might be different from the original. Left  Thrombotic--aneursym clip  Formatting of this note might be different from the original. brain aneurysm 2006 aug, left with right leg weakness     CAD (coronary artery disease) 3/7/2012    Overview:  Noted in old records, normal nuclear stress  "test 2010   Formatting of this note might be different from the original. Noted in old records, normal nuclear stress test 2010     Cerebral aneurysm     right leg weakness residual     Chronic kidney disease     CKD stage 3     Cigarette nicotine dependence in remission 11/14/2016     Colon polyps     past hx.     Coronary artery disease      CVA (cerebral vascular accident) (H) 12/30/2016     Depression      Depression with anxiety 10/18/2011    Overview:   Formatting of this note might be different from the original.     Fall at home 12/30/2019     Fracture of lumbar vertebra (H) 4/5/2016    Overview:  Overview:  Bilateral L2 pedicle fracture Bilateral L2 pedicle fracture Replacement Utility updated for latest IMO load   Formatting of this note might be different from the original. Overview:  Bilateral L2 pedicle fracture     GERD (gastroesophageal reflux disease)      History of blood clots      History of colonic polyps 2/9/2012    Overview:  Last colonoscopy 9/22/9009 normal. Repeat recommended in 5 years.  5/16/2013 small polyp. Follow up  5 years.  12/15/2015 repeat colonoscopy normal. \"prep not great\".   Formatting of this note might be different from the original. Last colonoscopy 9/22/9009 normal. Repeat recommended in 5 years.  5/16/2013 small polyp. Follow up  5 years.  12/15/2015 repeat colonoscopy normal. \"prep not     History of transfusion      Hyperlipidemia      Hypertension      Hypertension, goal below 140/80 12/30/2016     Hypokalemia      Iron deficiency anemia 2/22/2016    Overview:  12/2015 EGD and colonoscopy did not show significant source of bleeding.  Only gastritis seen on upper endoscopy  Formatting of this note might be different from the original. 12/2015 EGD and colonoscopy did not show significant source of bleeding.  Only gastritis seen on upper endoscopy 2/3/2020 colonoscopy showed only a hyperplastic polyp  2021- Follows with Dr. Alonzo for iron infusi     Kyphosis of thoracic " region 11/14/2016     Lactose intolerance      Low back pain 4/5/2016     Lumbar pseudoarthrosis      Lumbar radiculopathy 11/9/2018     Lumbar vertebral fracture (H)      Major depression, recurrent (H) 12/30/2016     Nontraumatic compression fracture of T7 vertebra (H) 11/14/2016     Osteoporosis      Pain medication agreement 3/13/2015    Overview:  Takes one Norco at bedtime for knee pain Started trial of Tramadol     Parathyroid adenoma      Postoperative anemia      Primary osteoarthritis of right knee 2/10/2021     Pseudoarthrosis of lumbar spine 4/5/2016     Pseudophakia, both eyes 5/19/2017     PVD (peripheral vascular disease) (H)      Right hip pain      S/P lumbar spinal fusion 12/5/2012    Overview:  Dr. Rooney, 7/2012 4/2016 Seen by Dr. Saskia Gastelum neurosurgery at [facility], needs repeat surgery, spontaneous pedicle fractures,  but treatment first for osteoporosis with Foteo.   Formatting of this note might be different from the original. Dr. Rooney, 7/2012 4/2016 Seen by Dr. Saskia Gastelum neurosurgery at [facility], needs repeat surgery, spontaneous pedicle fr     S/P TKR (total knee replacement), right 4/26/2021     Scheuermann's kyphosis 7/19/2016     Senile osteoporosis 5/9/2012    Overview:   history of Barahona's so can not take bisphosphonates. Referred to endocrinology. 5/23/2012 seen by Dr. Stout, recommended IV reclast, at some point, timing will not affect upcoming spine surgery.  Seen by Dr. Denise, treated with Reclast 6/25/2014, 7/9/2015 Referred to Dr. Lala by neurosurgery Dr. Saskia Gastelum at [facility]. due to the need for repeat spinal surgery and persi     Severe sepsis (H) 12/16/2018     Stage 3a chronic kidney disease 12/30/2016     Status post total hip replacement, right 10/6/2017     Stroke (H)     cerebral infarction-aneurysm     Synovial cyst of left popliteal space 2/10/2021     Ureteral stone 10/31/2018    Overview:  Treated with stent 4/2018   [facility]   Formatting of this note might be different from the original. Treated with stent 4/2018 Dr. Dozier     Urinary retention with incomplete bladder emptying 11/26/2018     Vitamin D deficiency      Xerostomia     dryness in mouth           Surgical History:  Past Surgical History:   Procedure Laterality Date     APPENDECTOMY       BACK SURGERY       CEREBRAL ANEURYSM REPAIR  2006    clipped     CEREBRAL ANEURYSM REPAIR       CERVICAL LAMINECTOMY      C6-7     CHOLECYSTECTOMY       EYE SURGERY Bilateral     cat     FEMORAL BYPASS  1995    left     FRACTURE SURGERY Right     hip pinning     HEMORRHOID SURGERY       HYSTERECTOMY      BSO     LUMBAR FUSION      L3-4, L4-5     PARATHYROIDECTOMY N/A 12/30/2016    Procedure: NECK EXPLORATION FOR PARATHYROID ;  Surgeon: Gilberto Snowden MD;  Location: [facility];  Service:      REVISION TOTAL HIP ARTHROPLASTY Right 10/6/2017    Procedure: REMOVAL OF TROCHANTERIC NAIL CONVERSION TO RIGHT TOTAL HIP ARTRHOPLASTY;  Surgeon: Seb Ortiz DO;  Location: [facility];  Service:      right wrist closed reduction         Family History:   Family History   Problem Relation Age of Onset     Cerebral aneurysm Sister        Social History:    Social History     Socioeconomic History     Marital status:      Spouse name: None     Number of children: None     Years of education: None     Highest education level: None   Occupational History     None   Social Needs     Financial resource strain: None     Food insecurity     Worry: None     Inability: None     Transportation needs     Medical: None     Non-medical: None   Tobacco Use     Smoking status: Former Smoker     Packs/day: 0.50     Years: 15.00     Pack years: 7.50     Quit date: 1/1/2006     Years since quitting: 15.3     Smokeless tobacco: Never Used   Substance and Sexual Activity     Alcohol use: Yes     Comment: rare     Drug use: No     Sexual activity: None   Lifestyle     Physical activity      Days per week: None     Minutes per session: None     Stress: None   Relationships     Social connections     Talks on phone: None     Gets together: None     Attends Yarsanism service: None     Active member of club or organization: None     Attends meetings of clubs or organizations: None     Relationship status: None     Intimate partner violence     Fear of current or ex partner: None     Emotionally abused: None     Physically abused: None     Forced sexual activity: None   Other Topics Concern     None   Social History Narrative     None          Review of Systems   Constitutional:        Patient denies any fevers chills nausea vomit diarrhea change in vision hearing taste or smell weakness one-sided chest pain shortness of breath.  Denies any current shortness stool polyphagia polydipsia polyuria depression or anxiety and the main review of systems negative.    Pain well controlled at rest but not with ambulation.       Vitals:    04/30/21 0930   BP: 136/63   Pulse: 63   Resp: 18   Temp: 97.3  F (36.3  C)   SpO2: 90%       Physical Exam  Constitutional:       General: She is not in acute distress.  HENT:      Head: Normocephalic and atraumatic.      Nose: Nose normal.   Cardiovascular:      Rate and Rhythm: Normal rate and regular rhythm.   Pulmonary:      Effort: Pulmonary effort is normal. No respiratory distress.      Breath sounds: No wheezing.   Musculoskeletal:      Right lower leg: No edema.      Left lower leg: No edema.      Comments: Patient is a hydrocolloid dressing on at this time.   Skin:     General: Skin is warm and dry.   Neurological:      Mental Status: She is alert. Mental status is at baseline.         Medication List:  Current Outpatient Medications   Medication Sig     acetaminophen (TYLENOL) 500 MG tablet Take 1,000 mg by mouth every 6 (six) hours.     alendronate (FOSAMAX) 70 MG tablet TAKE 1 TABLET BY MOUTH EVERY 7 DAYS. TAKE IN THE MORNING ON AN EMPTY STOMACH WITH A FULL GLASS OF  WATER 30 MINUTES BEFORE FOOD.     amLODIPine (NORVASC) 10 MG tablet Take 10 mg by mouth daily.     aspirin 81 mg chewable tablet Chew 81 mg daily.     calcium carbonate-vitamin D3 600 mg(1,500mg) -800 unit per tablet Take 1 tablet by mouth daily.     cholecalciferol, vitamin D3, 1,000 unit tablet Take 3,000 Units by mouth daily.     clopidogrel (PLAVIX) 75 mg tablet Take 75 mg by mouth daily.     cyanocobalamin 1000 MCG tablet Take 1,000 mcg by mouth daily.     esomeprazole (NEXIUM) 20 MG capsule Take 20 mg by mouth daily before breakfast.     gabapentin (NEURONTIN) 100 MG capsule Take 100 mg by mouth 3 (three) times a day. Gradually taper up per your doctors instructions     hydroCHLOROthiazide (HYDRODIURIL) 25 MG tablet Take 25 mg by mouth daily.     HYDROcodone-acetaminophen 5-325 mg per tablet Take 1-2 tablets by mouth every 4 (four) hours as needed for pain.     lisinopriL (PRINIVIL,ZESTRIL) 40 MG tablet Take 40 mg by mouth daily.     mirtazapine (REMERON) 30 MG tablet Take 30 mg by mouth bedtime.     MULTIVITAMIN ORAL Take 1 tablet by mouth daily.      pravastatin (PRAVACHOL) 40 MG tablet Take 40 mg by mouth bedtime.     senna (SENOKOT) 8.6 mg tablet Take 2 tablets by mouth 2 (two) times a day.     sertraline (ZOLOFT) 50 MG tablet TAKE 3 TABLETS BY MOUTH EVERY MORNING       Labs: 2021 hemoglobin is 10.6, MCV was 96.      Assessment:    ICD-10-CM    1. S/P TKR (total knee replacement), right  Z96.651    2. Iron deficiency anemia, unspecified iron deficiency anemia type  D50.9    3. Stage 3a chronic kidney disease  N18.31    4. Essential hypertension  I10    5. Peripheral vascular disease (H)  I73.9    6. Coronary artery disease due to lipid rich plaque  I25.10     I25.83    7. Pain management  R52        Plan: Plan at this time will DC scheduled Tylenol at this time and for pain we will go with Norco as scheduled.  Pain assessments will be done every 4 hours and will maximize nonpharmacologic modalities with  cool pack to the right knee continuously and change every 2 hours.    We will check a basic metabolic profile hemoglobin on Monday secondary to chronic kidney disease and anemia.    No other changes to medications at this time we will continue to monitor above medical problems and no other changes to care plan at this time.        Electronically signed by: Corbin Brown DO